# Patient Record
Sex: FEMALE | Race: BLACK OR AFRICAN AMERICAN | NOT HISPANIC OR LATINO | Employment: FULL TIME | URBAN - METROPOLITAN AREA
[De-identification: names, ages, dates, MRNs, and addresses within clinical notes are randomized per-mention and may not be internally consistent; named-entity substitution may affect disease eponyms.]

---

## 2020-07-20 ENCOUNTER — TELEPHONE (OUTPATIENT)
Dept: FAMILY MEDICINE CLINIC | Facility: CLINIC | Age: 50
End: 2020-07-20

## 2020-07-24 ENCOUNTER — OFFICE VISIT (OUTPATIENT)
Dept: FAMILY MEDICINE CLINIC | Facility: CLINIC | Age: 50
End: 2020-07-24

## 2020-07-24 VITALS
RESPIRATION RATE: 16 BRPM | HEIGHT: 67 IN | TEMPERATURE: 97.7 F | HEART RATE: 71 BPM | SYSTOLIC BLOOD PRESSURE: 120 MMHG | OXYGEN SATURATION: 97 % | WEIGHT: 293 LBS | DIASTOLIC BLOOD PRESSURE: 80 MMHG | BODY MASS INDEX: 45.99 KG/M2

## 2020-07-24 DIAGNOSIS — I10 ESSENTIAL HYPERTENSION: ICD-10-CM

## 2020-07-24 DIAGNOSIS — E66.01 CLASS 3 SEVERE OBESITY DUE TO EXCESS CALORIES WITHOUT SERIOUS COMORBIDITY WITH BODY MASS INDEX (BMI) OF 50.0 TO 59.9 IN ADULT (HCC): Primary | ICD-10-CM

## 2020-07-24 PROBLEM — Z86.718 HISTORY OF DEEP VEIN THROMBOSIS (DVT) OF LOWER EXTREMITY: Status: ACTIVE | Noted: 2020-07-24

## 2020-07-24 PROBLEM — E66.9 OBESITY, UNSPECIFIED: Status: ACTIVE | Noted: 2020-07-24

## 2020-07-24 PROBLEM — Z01.419 ENCOUNTER FOR GYNECOLOGICAL EXAMINATION: Status: ACTIVE | Noted: 2019-11-08

## 2020-07-24 PROBLEM — I48.0 PAROXYSMAL ATRIAL FIBRILLATION: Status: ACTIVE | Noted: 2017-03-27

## 2020-07-24 PROBLEM — M54.50 LOW BACK PAIN: Status: ACTIVE | Noted: 2019-05-06

## 2020-07-24 PROBLEM — J45.909 ASTHMA: Status: ACTIVE | Noted: 2020-02-03

## 2020-07-24 PROBLEM — N92.0 EXCESSIVE OR FREQUENT MENSTRUATION: Status: ACTIVE | Noted: 2020-07-24

## 2020-07-24 PROBLEM — I82.402 DEEP VEIN THROMBOSIS OF LEFT LOWER EXTREMITY: Status: ACTIVE | Noted: 2018-03-19

## 2020-07-24 PROBLEM — G47.33 OBSTRUCTIVE SLEEP APNEA (ADULT) (PEDIATRIC): Status: ACTIVE | Noted: 2018-03-19

## 2020-07-24 PROCEDURE — 99214 OFFICE O/P EST MOD 30 MIN: CPT | Performed by: INTERNAL MEDICINE

## 2020-07-24 RX ORDER — METOPROLOL SUCCINATE 50 MG/1
50 TABLET, EXTENDED RELEASE ORAL DAILY
COMMUNITY
End: 2020-10-22 | Stop reason: SDUPTHER

## 2020-07-24 RX ORDER — ALBUTEROL SULFATE 2.5 MG/3ML
2.5 SOLUTION RESPIRATORY (INHALATION) 3 TIMES DAILY PRN
COMMUNITY
End: 2022-02-14

## 2020-07-24 RX ORDER — ACETAMINOPHEN 500 MG
500 TABLET ORAL AS NEEDED
COMMUNITY

## 2020-08-22 NOTE — PROGRESS NOTES
07/24/2020    CC: Hypertension (follow up)  .        HPI  Obesity   This is a chronic problem. The current episode started more than 1 year ago. The problem occurs constantly. The problem has been waxing and waning. The treatment provided no relief.        Renny Frey is a 49 y.o. female.      The following portions of the patient's history were reviewed and updated as appropriate: allergies, current medications, past family history, past medical history, past social history, past surgical history and problem list.    Problem List  Patient Active Problem List   Diagnosis   • Asthma   • Low back pain   • Deep vein thrombosis of left lower extremity (CMS/HCC)   • Obstructive sleep apnea (adult) (pediatric)   • Paroxysmal atrial fibrillation (CMS/HCC)   • Major depressive disorder, single episode, unspecified   • Excessive or frequent menstruation   • Polyp of corpus uteri   • Essential hypertension, benign   • Mitral valve insufficiency   • Obesity, unspecified   • Encounter for gynecological examination   • History of deep vein thrombosis (DVT) of lower extremity       Past Medical History  Past Medical History:   Diagnosis Date   • Asthma 2/3/2020   • Deep vein thrombosis of left lower extremity (CMS/HCC) 3/19/2018   • Essential hypertension, benign 4/28/2011   • Excessive or frequent menstruation 7/24/2020   • Low back pain 5/6/2019   • Major depressive disorder, single episode, unspecified 11/5/2012   • Mitral valve insufficiency 1/1/2008   • Obesity, unspecified 7/24/2020   • Obstructive sleep apnea (adult) (pediatric) 3/19/2018   • Paroxysmal atrial fibrillation (CMS/HCC) 3/27/2017   • Polyp of corpus uteri 9/25/2012       Surgical History  Past Surgical History:   Procedure Laterality Date   • TONSILLECTOMY  12/01/2012       Family History  History reviewed. No pertinent family history.    Social History  Social History    Tobacco Use      Smoking status: Never Smoker      Smokeless tobacco:  Never Used       Is the Patient a current tobacco user? No    Allergies  No Known Allergies    Current Medications    Current Outpatient Medications:   •  acetaminophen (TYLENOL) 500 MG tablet, Take 500 mg by mouth Every 6 (Six) Hours As Needed for Mild Pain ., Disp: , Rfl:   •  albuterol (PROVENTIL) (2.5 MG/3ML) 0.083% nebulizer solution, Take 2.5 mg by nebulization 3 (Three) Times a Day As Needed for Wheezing., Disp: , Rfl:   •  apixaban (ELIQUIS) 5 MG tablet tablet, Take 5 mg by mouth 2 (Two) Times a Day., Disp: , Rfl:   •  metoprolol succinate XL (TOPROL-XL) 50 MG 24 hr tablet, Take 50 mg by mouth Daily., Disp: , Rfl:      Review of System  Review of Systems   Constitutional: Negative.    HENT: Negative.    Eyes: Negative.    Respiratory: Negative.    Cardiovascular: Negative.    Gastrointestinal: Negative.    Musculoskeletal: Negative.    Skin: Negative.    Psychiatric/Behavioral: Negative.      I have reviewed and confirmed the accuracy of the ROS as documented by the MA/LPN/RN Yousuf Dhillon MD    Vitals:    07/24/20 1045   BP: 120/80   Pulse: 71   Resp: 16   Temp: 97.7 °F (36.5 °C)   SpO2: 97%     Body mass index is 57.39 kg/m².    Objective     No visits with results within 30 Day(s) from this visit.   Latest known visit with results is:   No results found for any previous visit.       Physical Exam  Physical Exam   Constitutional: She is oriented to person, place, and time. She appears well-developed and well-nourished.   Eyes: Conjunctivae and EOM are normal.   Neck: Normal range of motion. Neck supple.   Cardiovascular: Normal rate, regular rhythm and normal heart sounds.   Pulmonary/Chest: Effort normal and breath sounds normal.   Abdominal: Soft. Bowel sounds are normal.   Neurological: She is alert and oriented to person, place, and time.   Skin: Skin is warm and dry.   Psychiatric: She has a normal mood and affect. Her behavior is normal.   Nursing note and vitals reviewed.      Assessment/Plan    Sarah was seen today for hypertension and discussion regarding her weight    Patient is blood pressure was well-controlled today at 120/80 in the left arm sitting position standard cuff.  She relates she's taken her medications as prescribed.  A little despondent today as she has continued to  weight.  Her cane at home and being under more stress is causing her to eat more.  She is tried a number of diets in the past which have not been successful.    Total time of office visit was 30 minutes of which 25 minutes was spent discussing the areas of weight loss and answering the patient's questions regarding bariatric surgery.  Discussed that weight loss will be a lifestyle experience even if coupled with bariatric surgery.  It is important that the patient not have unwanted expectations and will set that the surgery alone will not also weight loss and keep it off.  I did not feel expert enough to recommend bariatric over medical weight loss.  However, the patient is willing to get more information regarding bariatric surgery and will do so before our next visit.  She will also check with her insurance regarding procedures for getting meds for this patient etc.  Patient will also determine what physicians in the area approved by her insurance if she decides on bariatric surgery.    Follow-up in 3-4 months              Diagnoses and all orders for this visit:    Class 3 severe obesity due to excess calories without serious comorbidity with body mass index (BMI) of 50.0 to 59.9 in adult (CMS/[facility])    Essential hypertension             Yousuf Dhillon MD  07/24/2020

## 2020-10-22 RX ORDER — METOPROLOL SUCCINATE 50 MG/1
50 TABLET, EXTENDED RELEASE ORAL DAILY
Status: CANCELLED | OUTPATIENT
Start: 2020-10-22

## 2020-10-22 RX ORDER — METOPROLOL SUCCINATE 50 MG/1
50 TABLET, EXTENDED RELEASE ORAL DAILY
Qty: 30 TABLET | Refills: 4 | Status: SHIPPED | OUTPATIENT
Start: 2020-10-22 | End: 2021-10-12

## 2020-10-22 NOTE — TELEPHONE ENCOUNTER
PATIENT REQUESTED A REFILL ON:metoprolol succinate XL (TOPROL-XL) 50 MG 24 hr tablet    PATIENT CAN BE REACHED ON:[phone]    PHARMACY PREFERRED:[facility] [address] [place] [address] ([facility]), [place] - [address] [facility] [phone] [facility] [phone] FX

## 2021-03-15 ENCOUNTER — OFFICE VISIT (OUTPATIENT)
Dept: FAMILY MEDICINE CLINIC | Facility: CLINIC | Age: 51
End: 2021-03-15

## 2021-03-15 VITALS — RESPIRATION RATE: 16 BRPM | HEIGHT: 67 IN | BODY MASS INDEX: 45.99 KG/M2 | WEIGHT: 293 LBS

## 2021-03-15 DIAGNOSIS — I48.0 PAROXYSMAL ATRIAL FIBRILLATION (HCC): ICD-10-CM

## 2021-03-15 DIAGNOSIS — R63.4 WEIGHT LOSS: Primary | ICD-10-CM

## 2021-03-15 DIAGNOSIS — D50.9 IRON DEFICIENCY ANEMIA, UNSPECIFIED IRON DEFICIENCY ANEMIA TYPE: ICD-10-CM

## 2021-03-15 PROBLEM — R63.2 POLYPHAGIA: Status: ACTIVE | Noted: 2020-12-23

## 2021-03-15 PROBLEM — M19.90 ARTHRITIS: Status: ACTIVE | Noted: 2020-03-16

## 2021-03-15 PROBLEM — Z86.718 PERSONAL HISTORY OF OTHER VENOUS THROMBOSIS AND EMBOLISM: Status: ACTIVE | Noted: 2020-07-24

## 2021-03-15 PROBLEM — J30.9 ALLERGIC RHINITIS: Status: ACTIVE | Noted: 2020-03-16

## 2021-03-15 PROCEDURE — 99396 PREV VISIT EST AGE 40-64: CPT | Performed by: INTERNAL MEDICINE

## 2021-03-15 RX ORDER — DIPHENOXYLATE HYDROCHLORIDE AND ATROPINE SULFATE 2.5; .025 MG/1; MG/1
1 TABLET ORAL AS NEEDED
COMMUNITY

## 2021-03-15 NOTE — PROGRESS NOTES
03/15/2021    CC: Annual Exam (...no other issues)  .        HPI  Patient presents for physical examination.       Renny Frey is a 50 y.o. female.      The following portions of the patient's history were reviewed and updated as appropriate: allergies, current medications, past family history, past medical history, past social history, past surgical history and problem list.    Problem List  Patient Active Problem List   Diagnosis   • Asthma   • Low back pain   • Deep vein thrombosis of left lower extremity (CMS/HCC)   • Obstructive sleep apnea syndrome   • Paroxysmal atrial fibrillation (CMS/HCC)   • Major depression, single episode   • Excessive or frequent menstruation   • Polyp of corpus uteri   • Benign essential hypertension   • Mitral valve regurgitation   • Obesity   • Encounter for gynecological examination   • History of deep vein thrombosis (DVT) of lower extremity   • Allergic rhinitis   • Arthritis   • Personal history of other venous thrombosis and embolism   • Polyphagia   • Viral hepatitis C       Past Medical History  Past Medical History:   Diagnosis Date   • Asthma 2/3/2020   • Deep vein thrombosis of left lower extremity (CMS/HCC) 3/19/2018   • Essential hypertension, benign 4/28/2011   • Excessive or frequent menstruation 7/24/2020   • Low back pain 5/6/2019   • Major depressive disorder, single episode, unspecified 11/5/2012   • Mitral valve insufficiency 1/1/2008   • Obesity, unspecified 7/24/2020   • Obstructive sleep apnea (adult) (pediatric) 3/19/2018   • Paroxysmal atrial fibrillation (CMS/HCC) 3/27/2017   • Polyp of corpus uteri 9/25/2012       Surgical History  Past Surgical History:   Procedure Laterality Date   • TONSILLECTOMY  12/01/2012       Family History  History reviewed. No pertinent family history.    Social History  Social History    Tobacco Use      Smoking status: Never Smoker      Smokeless tobacco: Never Used       Is the Patient a current tobacco user?  No    Allergies  No Known Allergies    Current Medications    Current Outpatient Medications:   •  acetaminophen (TYLENOL) 500 MG tablet, Take 500 mg by mouth Every 6 (Six) Hours As Needed for Mild Pain ., Disp: , Rfl:   •  albuterol (PROVENTIL) (2.5 MG/3ML) 0.083% nebulizer solution, Take 2.5 mg by nebulization 3 (Three) Times a Day As Needed for Wheezing., Disp: , Rfl:   •  apixaban (ELIQUIS) 5 MG tablet tablet, Take 5 mg by mouth 2 (Two) Times a Day., Disp: , Rfl:   •  metoprolol succinate XL (TOPROL-XL) 50 MG 24 hr tablet, Take 1 tablet by mouth Daily., Disp: 30 tablet, Rfl: 4  •  multivitamin (MULTIPLE VITAMIN-FOLIC ACID PO), Take  by mouth., Disp: , Rfl:      Review of System  Review of Systems   Constitutional: Negative.    HENT: Negative.    Eyes: Negative.    Respiratory: Negative.    Cardiovascular: Negative.    Gastrointestinal: Negative.    Musculoskeletal: Negative.    Skin: Negative.    Psychiatric/Behavioral: Negative.      I have reviewed and confirmed the accuracy of the ROS as documented by the MA/LPN/RN Yousuf Dhillon MD    Vitals:    03/15/21 1408   Resp: 16     Body mass index is 55.91 kg/m².    Objective     Physical Exam  Physical Exam  Vitals and nursing note reviewed.   Constitutional:       Appearance: She is well-developed. She is obese.   HENT:      Head: Normocephalic and atraumatic.      Mouth/Throat:      Mouth: Mucous membranes are moist.   Eyes:      Extraocular Movements: Extraocular movements intact.      Conjunctiva/sclera: Conjunctivae normal.   Cardiovascular:      Rate and Rhythm: Normal rate and regular rhythm.      Heart sounds: Normal heart sounds.   Pulmonary:      Effort: Pulmonary effort is normal.      Breath sounds: Normal breath sounds.   Abdominal:      General: Bowel sounds are normal.      Palpations: Abdomen is soft.   Musculoskeletal:         General: Normal range of motion.      Cervical back: Normal range of motion and neck supple.   Skin:     General: Skin  is warm and dry.   Neurological:      Mental Status: She is alert and oriented to person, place, and time.   Psychiatric:         Mood and Affect: Mood normal.         Behavior: Behavior normal.         Assessment/Plan      In the interim of visits patient had LAP-BAND surgery done at [facility].  She has lost a total of 14 pounds.  She is a little despondent because she has not lost more but she is making progress.    We had extended discussion regarding the COVID-19 vaccination and whether or not she should get it.  She has not quite decided.  I answered her questions regarding side effects and advantages versus disadvantages of both all 3 types of vaccines available in the [place].              Diagnoses and all orders for this visit:    1. Weight loss (Primary)  -     Lipid panel; Future  -     Comprehensive Metabolic Panel  -     Hemoglobin A1c  -     Lipid Panel With / Chol / HDL Ratio  -     Urinalysis With Culture If Indicated -  -     T4, Free  -     TSH    2. Iron deficiency anemia, unspecified iron deficiency anemia type  -     Comprehensive metabolic panel; Future  -     CBC & Differential    3. Paroxysmal atrial fibrillation (CMS/HCC)         Plan:  Follow-up in the next 5 to 6 months.    Yousuf Dhillon MD  03/15/2021

## 2021-03-16 LAB
ALBUMIN SERPL-MCNC: 4.1 G/DL (ref 3.8–4.8)
ALBUMIN/GLOB SERPL: 1.5 {RATIO} (ref 1.2–2.2)
ALP SERPL-CCNC: 85 IU/L (ref 39–117)
ALT SERPL-CCNC: 11 IU/L (ref 0–32)
AST SERPL-CCNC: 16 IU/L (ref 0–40)
BASOPHILS # BLD AUTO: 0 X10E3/UL (ref 0–0.2)
BASOPHILS NFR BLD AUTO: 0 %
BILIRUB SERPL-MCNC: 0.4 MG/DL (ref 0–1.2)
BUN SERPL-MCNC: 11 MG/DL (ref 6–24)
BUN/CREAT SERPL: 14 (ref 9–23)
CALCIUM SERPL-MCNC: 9.3 MG/DL (ref 8.7–10.2)
CHLORIDE SERPL-SCNC: 105 MMOL/L (ref 96–106)
CHOLEST SERPL-MCNC: 139 MG/DL (ref 100–199)
CHOLEST/HDLC SERPL: 3.6 RATIO (ref 0–4.4)
CO2 SERPL-SCNC: 24 MMOL/L (ref 20–29)
CREAT SERPL-MCNC: 0.76 MG/DL (ref 0.57–1)
EOSINOPHIL # BLD AUTO: 0.3 X10E3/UL (ref 0–0.4)
EOSINOPHIL NFR BLD AUTO: 4 %
ERYTHROCYTE [DISTWIDTH] IN BLOOD BY AUTOMATED COUNT: 13.2 % (ref 11.7–15.4)
GLOBULIN SER CALC-MCNC: 2.7 G/DL (ref 1.5–4.5)
GLUCOSE SERPL-MCNC: 82 MG/DL (ref 65–99)
HBA1C MFR BLD: 5.8 % (ref 4.8–5.6)
HCT VFR BLD AUTO: 38.7 % (ref 34–46.6)
HDLC SERPL-MCNC: 39 MG/DL
HGB BLD-MCNC: 12.5 G/DL (ref 11.1–15.9)
IMM GRANULOCYTES # BLD AUTO: 0 X10E3/UL (ref 0–0.1)
IMM GRANULOCYTES NFR BLD AUTO: 0 %
LDLC SERPL CALC-MCNC: 88 MG/DL (ref 0–99)
LYMPHOCYTES # BLD AUTO: 2.9 X10E3/UL (ref 0.7–3.1)
LYMPHOCYTES NFR BLD AUTO: 41 %
MCH RBC QN AUTO: 29 PG (ref 26.6–33)
MCHC RBC AUTO-ENTMCNC: 32.3 G/DL (ref 31.5–35.7)
MCV RBC AUTO: 90 FL (ref 79–97)
MONOCYTES # BLD AUTO: 0.6 X10E3/UL (ref 0.1–0.9)
MONOCYTES NFR BLD AUTO: 9 %
NEUTROPHILS # BLD AUTO: 3.3 X10E3/UL (ref 1.4–7)
NEUTROPHILS NFR BLD AUTO: 46 %
PLATELET # BLD AUTO: 261 X10E3/UL (ref 150–450)
POTASSIUM SERPL-SCNC: 4.2 MMOL/L (ref 3.5–5.2)
PROT SERPL-MCNC: 6.8 G/DL (ref 6–8.5)
RBC # BLD AUTO: 4.31 X10E6/UL (ref 3.77–5.28)
SODIUM SERPL-SCNC: 142 MMOL/L (ref 134–144)
T4 FREE SERPL-MCNC: 1.21 NG/DL (ref 0.82–1.77)
TRIGL SERPL-MCNC: 54 MG/DL (ref 0–149)
TSH SERPL DL<=0.005 MIU/L-ACNC: 1.27 UIU/ML (ref 0.45–4.5)
VLDLC SERPL CALC-MCNC: 12 MG/DL (ref 5–40)
WBC # BLD AUTO: 7.2 X10E3/UL (ref 3.4–10.8)

## 2021-03-22 ENCOUNTER — TELEPHONE (OUTPATIENT)
Dept: FAMILY MEDICINE CLINIC | Facility: CLINIC | Age: 51
End: 2021-03-22

## 2021-03-22 NOTE — TELEPHONE ENCOUNTER
Caller: Sarah Frey    Relationship: Self    Best call back number: [phone]    What medications are you currently taking:   Current Outpatient Medications on File Prior to Visit   Medication Sig Dispense Refill   • acetaminophen (TYLENOL) 500 MG tablet Take 500 mg by mouth Every 6 (Six) Hours As Needed for Mild Pain .     • albuterol (PROVENTIL) (2.5 MG/3ML) 0.083% nebulizer solution Take 2.5 mg by nebulization 3 (Three) Times a Day As Needed for Wheezing.     • apixaban (ELIQUIS) 5 MG tablet tablet Take 5 mg by mouth 2 (Two) Times a Day.     • metoprolol succinate XL (TOPROL-XL) 50 MG 24 hr tablet Take 1 tablet by mouth Daily. 30 tablet 4   • multivitamin (MULTIPLE VITAMIN-FOLIC ACID PO) Take  by mouth.       No current facility-administered medications on file prior to visit.        Which medication are you concerned about: FLECAINIDE    Who prescribed you this medication: CARDIO DR GREGORY LUCAS    What are your concerns: WOULD LIKE TO TALK TO DR MC ABOUT THIS MEDICATION

## 2021-03-23 NOTE — TELEPHONE ENCOUNTER
Patient is currently seeing Dr. Soto, cardiologist.  . Dr. Soto ordered the patient's to start tomorrow because of increased irregularities in her heart.  The patient upon reading the package insert and other information is concerned about the side effects of the medication.  I directed her to give Dr. Soto a call and relay those concerns to Dr. Soto tomorrow.

## 2021-08-03 ENCOUNTER — TELEPHONE (OUTPATIENT)
Dept: FAMILY MEDICINE CLINIC | Facility: CLINIC | Age: 51
End: 2021-08-03

## 2021-08-03 NOTE — TELEPHONE ENCOUNTER
Patient advised to take Tylenol for the fever and muscle aches.  She can take a cough syrup for the cough and runny nose.

## 2021-08-03 NOTE — TELEPHONE ENCOUNTER
Caller: Sarah Frey    Relationship: Self    Best call back number: [phone]     What is the best time to reach you: ASAP  Who are you requesting to speak with (clinical staff, provider,  specific staff member): CLINICAL    Do you know the name of the person who called: SARAH    What was the call regarding: PATIENTS SON HAS BEEN DIAGNOSED WITH COVID AND SHE IS NOW HAVING SYMPTOMS AND THE HEALTH DEPARTEMENT [facility] . SHE WANTS TO KNOW IF THERE IS ANY MEDICINE SHE NEEDS OR ANY TREATMENT SHE NEEDS. SHE IS COUGHING , FEVER AND MUCUS , MUSCLE ACHES AND RUNNY NOSE .     Do you require a callback: YES

## 2021-08-12 ENCOUNTER — TELEPHONE (OUTPATIENT)
Dept: FAMILY MEDICINE CLINIC | Facility: CLINIC | Age: 51
End: 2021-08-12

## 2021-08-12 NOTE — TELEPHONE ENCOUNTER
Caller: Sarah Frey    Relationship: Self    Best call back number: [phone]    Who are you requesting to speak with (clinical staff, provider,  specific staff member): DR. MC       What was the call regarding: PATIENT WAS JUST DISCHARGED FROM [facility] FOR COVID PNEUMONIA. PATIENT IS FEELING MUCH BETTER NOW.      Do you require a callback: NO

## 2021-10-11 NOTE — TELEPHONE ENCOUNTER
Rx Refill Note  Requested Prescriptions     Pending Prescriptions Disp Refills   • metoprolol succinate XL (TOPROL-XL) 50 MG 24 hr tablet [Pharmacy Med Name: Metoprolol Succinate ER 50 MG Oral Tablet Extended Release 24 Hour] 30 tablet 0     Sig: Take 1 tablet by mouth once daily      Last office visit with prescribing clinician: 3/15/2021      Next office visit with prescribing clinician: Visit date not found            Rashel Soto MA  10/11/21, 13:33 EDT

## 2021-10-12 RX ORDER — METOPROLOL SUCCINATE 50 MG/1
TABLET, EXTENDED RELEASE ORAL
Qty: 30 TABLET | Refills: 0 | Status: SHIPPED | OUTPATIENT
Start: 2021-10-12 | End: 2021-11-09

## 2021-11-09 RX ORDER — METOPROLOL SUCCINATE 50 MG/1
TABLET, EXTENDED RELEASE ORAL
Qty: 30 TABLET | Refills: 0 | Status: SHIPPED | OUTPATIENT
Start: 2021-11-09 | End: 2021-12-15

## 2021-11-09 NOTE — TELEPHONE ENCOUNTER
Rx Refill Note  Requested Prescriptions     Pending Prescriptions Disp Refills   • metoprolol succinate XL (TOPROL-XL) 50 MG 24 hr tablet [Pharmacy Med Name: Metoprolol Succinate ER 50 MG Oral Tablet Extended Release 24 Hour] 30 tablet 0     Sig: Take 1 tablet by mouth once daily      Last office visit with prescribing clinician: 3/15/2021      Next office visit with prescribing clinician: 11/16/2021            Poly Gutierres MA  11/09/21, 16:41 EST

## 2021-11-16 ENCOUNTER — OFFICE VISIT (OUTPATIENT)
Dept: FAMILY MEDICINE CLINIC | Facility: CLINIC | Age: 51
End: 2021-11-16

## 2021-11-16 VITALS — HEIGHT: 67 IN | WEIGHT: 293 LBS | BODY MASS INDEX: 45.99 KG/M2

## 2021-11-16 DIAGNOSIS — F32.1 CURRENT MODERATE EPISODE OF MAJOR DEPRESSIVE DISORDER WITHOUT PRIOR EPISODE (HCC): Primary | Chronic | ICD-10-CM

## 2021-11-16 DIAGNOSIS — I47.1 PAT (PAROXYSMAL ATRIAL TACHYCARDIA) (HCC): Chronic | ICD-10-CM

## 2021-11-16 PROBLEM — J96.01 ACUTE RESPIRATORY FAILURE WITH HYPOXIA: Status: ACTIVE | Noted: 2021-08-09

## 2021-11-16 PROBLEM — J12.82 PNEUMONIA DUE TO COVID-19 VIRUS: Status: ACTIVE | Noted: 2021-08-09

## 2021-11-16 PROBLEM — I51.7 CARDIOMEGALY: Status: ACTIVE | Noted: 2021-08-09

## 2021-11-16 PROBLEM — U07.1 PNEUMONIA DUE TO COVID-19 VIRUS: Status: ACTIVE | Noted: 2021-08-09

## 2021-11-16 PROCEDURE — 99214 OFFICE O/P EST MOD 30 MIN: CPT | Performed by: INTERNAL MEDICINE

## 2021-11-16 RX ORDER — FLECAINIDE ACETATE 50 MG/1
50 TABLET ORAL EVERY 12 HOURS
COMMUNITY

## 2021-11-20 NOTE — PROGRESS NOTES
11/16/2021    CC: Anxiety  .        HPI  Depression  Visit Type: initial  Onset of symptoms: in the past 7 days  Progression since onset: waxing and waning  Patient presents with the following symptoms: anhedonia, decreased concentration, dizziness, feelings of worthlessness, insomnia, irritability, memory impairment and palpitations.         Renny Frey is a 51 y.o. female.      The following portions of the patient's history were reviewed and updated as appropriate: allergies, current medications, past family history, past medical history, past social history, past surgical history and problem list.    Problem List  Patient Active Problem List   Diagnosis   • Asthma   • Low back pain   • Deep vein thrombosis of left lower extremity (HCC)   • Obstructive sleep apnea syndrome   • Paroxysmal atrial fibrillation (HCC)   • Major depression, single episode   • Excessive or frequent menstruation   • Polyp of corpus uteri   • Benign essential hypertension   • Mitral valve regurgitation   • Obesity   • Encounter for gynecological examination   • History of deep vein thrombosis (DVT) of lower extremity   • Allergic rhinitis   • Arthritis   • Personal history of other venous thrombosis and embolism   • Polyphagia   • Viral hepatitis C   • Acute respiratory failure with hypoxia (HCC)   • Cardiomegaly   • Pneumonia due to COVID-19 virus       Past Medical History  Past Medical History:   Diagnosis Date   • Asthma 2/3/2020   • Deep vein thrombosis of left lower extremity (HCC) 3/19/2018   • Essential hypertension, benign 4/28/2011   • Excessive or frequent menstruation 7/24/2020   • Low back pain 5/6/2019   • Major depressive disorder, single episode, unspecified 11/5/2012   • Mitral valve insufficiency 1/1/2008   • Obesity, unspecified 7/24/2020   • Obstructive sleep apnea (adult) (pediatric) 3/19/2018   • Paroxysmal atrial fibrillation (HCC) 3/27/2017   • Polyp of corpus uteri 9/25/2012       Surgical  History  Past Surgical History:   Procedure Laterality Date   • TONSILLECTOMY  12/01/2012       Family History  History reviewed. No pertinent family history.    Social History  Social History    Tobacco Use      Smoking status: Never Smoker      Smokeless tobacco: Never Used       Is the Patient a current tobacco user? No    Allergies  No Known Allergies    Current Medications    Current Outpatient Medications:   •  acetaminophen (TYLENOL) 500 MG tablet, Take 500 mg by mouth Every 6 (Six) Hours As Needed for Mild Pain ., Disp: , Rfl:   •  albuterol (PROVENTIL) (2.5 MG/3ML) 0.083% nebulizer solution, Take 2.5 mg by nebulization 3 (Three) Times a Day As Needed for Wheezing., Disp: , Rfl:   •  apixaban (ELIQUIS) 5 MG tablet tablet, Take 5 mg by mouth 2 (Two) Times a Day., Disp: , Rfl:   •  flecainide (TAMBOCOR) 50 MG tablet, Take 50 mg by mouth Every 12 (Twelve) Hours., Disp: , Rfl:   •  metoprolol succinate XL (TOPROL-XL) 50 MG 24 hr tablet, Take 1 tablet by mouth once daily, Disp: 30 tablet, Rfl: 0  •  multivitamin (MULTIPLE VITAMIN-FOLIC ACID PO), Take  by mouth., Disp: , Rfl:      Review of System  Review of Systems   Constitutional: Positive for irritability.   HENT: Negative.    Eyes: Negative.    Respiratory: Negative.    Cardiovascular: Positive for palpitations.   Endocrine: Negative.    Psychiatric/Behavioral: Positive for decreased concentration. The patient has insomnia.      I have reviewed and confirmed the accuracy of the ROS as documented by the MA/LPN/RN Yousuf Dhillon MD    There were no vitals filed for this visit.  Body mass index is 53 kg/m².    Objective     Physical Exam  Physical Exam  HENT:      Right Ear: Tympanic membrane normal.      Left Ear: Tympanic membrane normal.   Cardiovascular:      Rate and Rhythm: Normal rate and regular rhythm.      Pulses: Normal pulses.      Heart sounds: Normal heart sounds.   Pulmonary:      Effort: Pulmonary effort is normal.      Breath sounds: Normal  breath sounds.         Assessment/Plan      This 51-year-old patient recently lost her significant other to COVID-19 related problems.  She relates she has a long history of seasonal affective disorder as well.  She works in a very pressurized environment and notes that her recent memory has decreased.    We administered a PHQ-9 for depression on which the patient scored a total of 12.  She answered several on 1, HTN-greater than a half on a, C, E, F-every on 1D, G-external difficult positive all #2.  In conclusion I feel the patient has minor to major depression.  However she is also taking flecainide for PAT prescribed by Dr. Cordelia Soto cardiologist.  The SSRIs potentiate the flecainide and therefore cannot be used.  Patient also has a history of obstructive sleep apnea.  We will contact Dr. Soto at [phone] at [facility] but for now we will not prescribe any SSRI but will seek referral to psychiatry regarding advice on medication.        Diagnoses and all orders for this visit:    1. Current moderate episode of major depressive disorder without prior episode (HCC) (Primary)  -     Ambulatory Referral to Psychiatry    2. PAT (paroxysmal atrial tachycardia) ([facility])      Plan:  1.)  We will contact Dr. Cordelia Soto regarding flecainide and SSRI.  2.)  Consultation to psychiatry regarding management of depression in this patient.       Yousuf Dhillon MD  11/16/2021

## 2021-11-30 ENCOUNTER — TELEPHONE (OUTPATIENT)
Dept: FAMILY MEDICINE CLINIC | Facility: CLINIC | Age: 51
End: 2021-11-30

## 2021-12-14 ENCOUNTER — OFFICE VISIT (OUTPATIENT)
Dept: FAMILY MEDICINE CLINIC | Facility: CLINIC | Age: 51
End: 2021-12-14

## 2021-12-14 VITALS — BODY MASS INDEX: 45.99 KG/M2 | WEIGHT: 293 LBS | RESPIRATION RATE: 16 BRPM | HEIGHT: 67 IN

## 2021-12-14 DIAGNOSIS — I47.1 PAT (PAROXYSMAL ATRIAL TACHYCARDIA) (HCC): Chronic | ICD-10-CM

## 2021-12-14 DIAGNOSIS — F32.1 CURRENT MODERATE EPISODE OF MAJOR DEPRESSIVE DISORDER WITHOUT PRIOR EPISODE (HCC): Primary | Chronic | ICD-10-CM

## 2021-12-14 PROCEDURE — 99214 OFFICE O/P EST MOD 30 MIN: CPT | Performed by: INTERNAL MEDICINE

## 2021-12-15 RX ORDER — METOPROLOL SUCCINATE 50 MG/1
TABLET, EXTENDED RELEASE ORAL
Qty: 30 TABLET | Refills: 0 | Status: SHIPPED | OUTPATIENT
Start: 2021-12-15 | End: 2022-01-18

## 2021-12-15 NOTE — TELEPHONE ENCOUNTER
Rx Refill Note  Requested Prescriptions     Pending Prescriptions Disp Refills   • metoprolol succinate XL (TOPROL-XL) 50 MG 24 hr tablet [Pharmacy Med Name: Metoprolol Succinate ER 50 MG Oral Tablet Extended Release 24 Hour] 30 tablet 0     Sig: Take 1 tablet by mouth once daily      Last office visit with prescribing clinician: 12/14/2021      Next office visit with prescribing clinician: 2/14/2022            Rashel Soto MA  12/15/21, 07:11 EST

## 2021-12-18 NOTE — PROGRESS NOTES
12/14/2021    CC: Depression (f/u...no other issues)  .        HPI  Depression  Visit Type: follow-up  Patient presents with the following symptoms: anhedonia, decreased concentration, depressed mood, fatigue and hypersomnia.  Frequency of symptoms: most days   Severity: moderate          Subjective   Sarah Frey is a 51 y.o. female.      The following portions of the patient's history were reviewed and updated as appropriate: allergies, current medications, past family history, past medical history, past social history, past surgical history and problem list.    Problem List  Patient Active Problem List   Diagnosis   • Asthma   • Low back pain   • Deep vein thrombosis of left lower extremity (HCC)   • Obstructive sleep apnea syndrome   • Paroxysmal atrial fibrillation (HCC)   • Major depression, single episode   • Excessive or frequent menstruation   • Polyp of corpus uteri   • Benign essential hypertension   • Mitral valve regurgitation   • Obesity   • Encounter for gynecological examination   • History of deep vein thrombosis (DVT) of lower extremity   • Allergic rhinitis   • Arthritis   • Personal history of other venous thrombosis and embolism   • Polyphagia   • Viral hepatitis C   • Acute respiratory failure with hypoxia (HCC)   • Cardiomegaly   • Pneumonia due to COVID-19 virus       Past Medical History  Past Medical History:   Diagnosis Date   • Asthma 2/3/2020   • Deep vein thrombosis of left lower extremity (HCC) 3/19/2018   • Essential hypertension, benign 4/28/2011   • Excessive or frequent menstruation 7/24/2020   • Low back pain 5/6/2019   • Major depressive disorder, single episode, unspecified 11/5/2012   • Mitral valve insufficiency 1/1/2008   • Obesity, unspecified 7/24/2020   • Obstructive sleep apnea (adult) (pediatric) 3/19/2018   • Paroxysmal atrial fibrillation (HCC) 3/27/2017   • Polyp of corpus uteri 9/25/2012       Surgical History  Past Surgical History:   Procedure Laterality Date   •  TONSILLECTOMY  12/01/2012       Family History  History reviewed. No pertinent family history.    Social History  Social History    Tobacco Use      Smoking status: Never Smoker      Smokeless tobacco: Never Used       Is the Patient a current tobacco user? No    Allergies  No Known Allergies    Current Medications    Current Outpatient Medications:   •  acetaminophen (TYLENOL) 500 MG tablet, Take 500 mg by mouth Every 6 (Six) Hours As Needed for Mild Pain ., Disp: , Rfl:   •  albuterol (PROVENTIL) (2.5 MG/3ML) 0.083% nebulizer solution, Take 2.5 mg by nebulization 3 (Three) Times a Day As Needed for Wheezing., Disp: , Rfl:   •  apixaban (ELIQUIS) 5 MG tablet tablet, Take 5 mg by mouth 2 (Two) Times a Day., Disp: , Rfl:   •  flecainide (TAMBOCOR) 50 MG tablet, Take 50 mg by mouth Every 12 (Twelve) Hours., Disp: , Rfl:   •  multivitamin (MULTIPLE VITAMIN-FOLIC ACID PO), Take  by mouth., Disp: , Rfl:   •  metoprolol succinate XL (TOPROL-XL) 50 MG 24 hr tablet, Take 1 tablet by mouth once daily, Disp: 30 tablet, Rfl: 0     Review of System  Review of Systems   Respiratory: Negative.    Cardiovascular: Negative.    Gastrointestinal: Negative.    Endocrine: Negative.    Psychiatric/Behavioral: Positive for decreased concentration and depressed mood.     I have reviewed and confirmed the accuracy of the ROS as documented by the MA/LPN/RN Yousuf Dhillon MD    Vitals:    12/14/21 1148   Resp: 16     Body mass index is 52.47 kg/m².    Objective     Physical Exam  Physical Exam  Constitutional:       Appearance: Normal appearance.   HENT:      Head: Normocephalic and atraumatic.   Cardiovascular:      Rate and Rhythm: Normal rate and regular rhythm.      Pulses: Normal pulses.      Heart sounds: Normal heart sounds.   Pulmonary:      Effort: Pulmonary effort is normal.      Breath sounds: Normal breath sounds.   Abdominal:      Palpations: Abdomen is soft.   Neurological:      Mental Status: She is alert.          Assessment/Plan      This pleasant 51-year-old recently lost her long-term significant other to COVID-19 and is adjusting to life.  Her administered PHQ-9 is led to a diagnosis of depression but the patient is also on flecainide which interacts with all the SSRIs we would ordinarily use.  We made a referral to psychiatry and she will touch bases with them to try to get an appointment for management of her depression.  She is also seeing a psychological therapist but I feel that she needs an antidepressant as well.  In any case she is reaching out to you 90 behavior regarding obtaining the services of a psychiatrist who can help determine a suitable antidepressant agent for use with flecainide or to assume management of her depression without medication.        Diagnoses and all orders for this visit:    1. Current moderate episode of major depressive disorder without prior episode (HCC) (Primary)    2. PAT (paroxysmal atrial tachycardia) (HCC)         Plan:  1.)  Follow-up in 2 to 3 months.    Yousuf Dhillon MD  12/14/2021

## 2022-01-18 RX ORDER — METOPROLOL SUCCINATE 50 MG/1
TABLET, EXTENDED RELEASE ORAL
Qty: 30 TABLET | Refills: 0 | Status: SHIPPED | OUTPATIENT
Start: 2022-01-18 | End: 2022-02-17

## 2022-01-18 NOTE — TELEPHONE ENCOUNTER
Rx Refill Note  Requested Prescriptions     Pending Prescriptions Disp Refills   • metoprolol succinate XL (TOPROL-XL) 50 MG 24 hr tablet [Pharmacy Med Name: Metoprolol Succinate ER 50 MG Oral Tablet Extended Release 24 Hour] 30 tablet 0     Sig: Take 1 tablet by mouth once daily      Last office visit with prescribing clinician: 12/14/2021      Next office visit with prescribing clinician: 2/14/2022            Rashel Soto MA  01/18/22, 11:38 EST

## 2022-02-14 ENCOUNTER — OFFICE VISIT (OUTPATIENT)
Dept: FAMILY MEDICINE CLINIC | Facility: CLINIC | Age: 52
End: 2022-02-14

## 2022-02-14 VITALS — BODY MASS INDEX: 45.99 KG/M2 | WEIGHT: 293 LBS | HEIGHT: 67 IN | RESPIRATION RATE: 16 BRPM

## 2022-02-14 DIAGNOSIS — F41.8 ANXIETY ASSOCIATED WITH DEPRESSION: Primary | Chronic | ICD-10-CM

## 2022-02-14 PROCEDURE — 99213 OFFICE O/P EST LOW 20 MIN: CPT | Performed by: INTERNAL MEDICINE

## 2022-02-14 RX ORDER — BUSPIRONE HYDROCHLORIDE 15 MG/1
15 TABLET ORAL 2 TIMES DAILY
COMMUNITY

## 2022-02-14 NOTE — PROGRESS NOTES
02/14/2022    CC: Depression (f/u...no other issues)  .        HPI  Depression  Visit Type: follow-up  Patient presents with the following symptoms: depressed mood and weight gain.  Frequency of symptoms: occasionally   Severity: mild          Subjective   Sarah Frey is a 51 y.o. female.      The following portions of the patient's history were reviewed and updated as appropriate: allergies, current medications, past family history, past medical history, past social history, past surgical history and problem list.    Problem List  Patient Active Problem List   Diagnosis   • Asthma   • Low back pain   • Deep vein thrombosis of left lower extremity (HCC)   • Obstructive sleep apnea syndrome   • Paroxysmal atrial fibrillation (HCC)   • Major depression, single episode   • Excessive or frequent menstruation   • Polyp of corpus uteri   • Benign essential hypertension   • Mitral valve regurgitation   • Obesity   • Encounter for gynecological examination   • History of deep vein thrombosis (DVT) of lower extremity   • Allergic rhinitis   • Arthritis   • Personal history of other venous thrombosis and embolism   • Polyphagia   • Viral hepatitis C   • Acute respiratory failure with hypoxia (HCC)   • Cardiomegaly   • Pneumonia due to COVID-19 virus       Past Medical History  Past Medical History:   Diagnosis Date   • Asthma 2/3/2020   • Deep vein thrombosis of left lower extremity (HCC) 3/19/2018   • Essential hypertension, benign 4/28/2011   • Excessive or frequent menstruation 7/24/2020   • Low back pain 5/6/2019   • Major depressive disorder, single episode, unspecified 11/5/2012   • Mitral valve insufficiency 1/1/2008   • Obesity, unspecified 7/24/2020   • Obstructive sleep apnea (adult) (pediatric) 3/19/2018   • Paroxysmal atrial fibrillation (HCC) 3/27/2017   • Polyp of corpus uteri 9/25/2012       Surgical History  Past Surgical History:   Procedure Laterality Date   • TONSILLECTOMY  12/01/2012       Family  History  History reviewed. No pertinent family history.    Social History  Social History    Tobacco Use      Smoking status: Never Smoker      Smokeless tobacco: Never Used       Is the Patient a current tobacco user? No    Allergies  No Known Allergies    Current Medications    Current Outpatient Medications:   •  acetaminophen (TYLENOL) 500 MG tablet, Take 500 mg by mouth Every 6 (Six) Hours As Needed for Mild Pain ., Disp: , Rfl:   •  apixaban (ELIQUIS) 5 MG tablet tablet, Take 5 mg by mouth 2 (Two) Times a Day., Disp: , Rfl:   •  busPIRone (BUSPAR) 10 MG tablet, Take 20 mg by mouth 2 (Two) Times a Day., Disp: , Rfl:   •  flecainide (TAMBOCOR) 50 MG tablet, Take 50 mg by mouth Every 12 (Twelve) Hours., Disp: , Rfl:   •  metoprolol succinate XL (TOPROL-XL) 50 MG 24 hr tablet, Take 1 tablet by mouth once daily, Disp: 30 tablet, Rfl: 0  •  multivitamin (MULTIPLE VITAMIN-FOLIC ACID PO), Take  by mouth., Disp: , Rfl:      Review of System  Review of Systems   Constitutional: Positive for unexpected weight gain.   HENT: Negative.    Eyes: Negative.    Respiratory: Negative.    Cardiovascular: Negative.    Gastrointestinal: Negative.    Endocrine: Negative.    Psychiatric/Behavioral: Positive for depressed mood.     I have reviewed and confirmed the accuracy of the ROS as documented by the MA/LPN/RN Yousuf Dhillon MD    Vitals:    02/14/22 1119   Resp: 16     Body mass index is 52.78 kg/m².    Objective     Physical Exam  Physical Exam  Constitutional:       Appearance: Normal appearance.   HENT:      Head: Normocephalic.   Eyes:      Extraocular Movements: Extraocular movements intact.   Pulmonary:      Effort: Pulmonary effort is normal.   Musculoskeletal:      Cervical back: Normal range of motion.   Neurological:      Mental Status: She is alert.         Assessment/Plan      This 51-year-old patient presented today for follow-up of anxiety with depression. Within the past 6 months she lost her significant other  of 10 years to Covid. She began seeing Elli Monteiro LCSW and was diagnosed with anxiety with depression.  She was also seen by [facility] and treated by nurse practitioner Miesha with BuSpar.  Patient relates that she is feeling significantly improved.  She is attending counseling sessions once a week.  Her weight is increased slightly to 337 pounds with a BMI of 52.8.  Overall her depressive state has significantly improved from my standpoint but she may still have some mild depression.  I will follow up with her in 6 months as she is receiving counseling and medication for this problem.        Diagnoses and all orders for this visit:    1. Anxiety associated with depression (Primary)  Comments:  Patient has made good progress with depression.  She will continue to see a counselor and [facility] for medication changes.      Plan:  1.)  Follow-up in 5 to 6 months for reevaluation of this anxiety and depression.  2.)  She is urged to keep her follow-up appointments with her counselor and [facility].       Yousuf Dhillon MD  02/14/2022

## 2022-02-17 RX ORDER — METOPROLOL SUCCINATE 50 MG/1
TABLET, EXTENDED RELEASE ORAL
Qty: 30 TABLET | Refills: 5 | Status: SHIPPED | OUTPATIENT
Start: 2022-02-17

## 2022-03-24 ENCOUNTER — OFFICE VISIT (OUTPATIENT)
Dept: FAMILY MEDICINE CLINIC | Facility: CLINIC | Age: 52
End: 2022-03-24

## 2022-03-24 VITALS — OXYGEN SATURATION: 99 % | HEIGHT: 67 IN | BODY MASS INDEX: 45.99 KG/M2 | HEART RATE: 58 BPM | WEIGHT: 293 LBS

## 2022-03-24 DIAGNOSIS — R05.9 COUGH: Primary | ICD-10-CM

## 2022-03-24 DIAGNOSIS — J30.1 ALLERGIC RHINITIS DUE TO POLLEN, UNSPECIFIED SEASONALITY: ICD-10-CM

## 2022-03-24 LAB
EXPIRATION DATE: NORMAL
FLUAV AG UPPER RESP QL IA.RAPID: NOT DETECTED
FLUBV AG UPPER RESP QL IA.RAPID: NOT DETECTED
INTERNAL CONTROL: NORMAL
Lab: NORMAL
SARS-COV-2 AG UPPER RESP QL IA.RAPID: NOT DETECTED

## 2022-03-24 PROCEDURE — 99214 OFFICE O/P EST MOD 30 MIN: CPT | Performed by: INTERNAL MEDICINE

## 2022-03-24 PROCEDURE — 87428 SARSCOV & INF VIR A&B AG IA: CPT | Performed by: INTERNAL MEDICINE

## 2022-03-24 RX ORDER — ASCORBIC ACID 500 MG
500 TABLET ORAL AS NEEDED
COMMUNITY

## 2022-03-24 RX ORDER — FLUTICASONE PROPIONATE 50 MCG
2 SPRAY, SUSPENSION (ML) NASAL DAILY
Qty: 16 G | Refills: 1 | Status: SHIPPED | OUTPATIENT
Start: 2022-03-24 | End: 2022-09-12

## 2022-03-24 RX ORDER — MONTELUKAST SODIUM 10 MG/1
10 TABLET ORAL NIGHTLY
Qty: 30 TABLET | Refills: 1 | Status: SHIPPED | OUTPATIENT
Start: 2022-03-24 | End: 2022-10-11

## 2022-03-24 NOTE — PROGRESS NOTES
Subjective Chief complaint is sinus problem  Sarah Frey is a 51 y.o. female.     History of Present Illness Sarah is here today for some respiratory issues.  Her symptoms began as sneezing approximately 2 days ago.  It then progressed to nasal congestion and drainage as well as a headache.  She is also having a sore throat.  She is not having fever or chills.  She is not having any myalgias or arthralgias.  She is not having any nausea vomiting or diarrhea.    The following portions of the patient's history were reviewed and updated as appropriate: allergies, current medications, past family history, past medical history, past social history, past surgical history and problem list.    Review of Systems   Constitutional: Negative for chills and fever.   HENT: Positive for congestion, postnasal drip, rhinorrhea and sinus pressure.        Objective   Physical Exam  Vitals and nursing note reviewed.   HENT:      Right Ear: Tympanic membrane and ear canal normal.      Left Ear: Tympanic membrane and ear canal normal.      Nose:      Comments: She has some bilateral nasal congestion.  There is no significant erythema.  Rhinorrhea is clear     Mouth/Throat:      Mouth: Mucous membranes are moist.      Pharynx: Oropharynx is clear. No oropharyngeal exudate or posterior oropharyngeal erythema.   Cardiovascular:      Rate and Rhythm: Normal rate and regular rhythm.   Pulmonary:      Effort: Pulmonary effort is normal.      Breath sounds: No wheezing, rhonchi or rales.   Chest:      Chest wall: No tenderness.   Lymphadenopathy:      Cervical: No cervical adenopathy.   Neurological:      Mental Status: She is alert.           Assessment/Plan   Diagnoses and all orders for this visit:    1. Cough (Primary)  -     POCT SARS-CoV-2 Antigen CHRISTOPHER    2. Allergic rhinitis due to pollen, unspecified seasonality      Sarah is here today for respiratory symptoms.  That currently of short duration I do not think she has a sinus  infection.  This appears more like allergy.  She does have some heart related issues and therefore we cannot use any pseudoephedrine.  I am going to prescribe some generic Singulair and Flonase.  I did advise her that if her symptoms persist through the weekend to contact Dr. Dao next week

## 2022-09-12 ENCOUNTER — OFFICE VISIT (OUTPATIENT)
Dept: FAMILY MEDICINE CLINIC | Facility: CLINIC | Age: 52
End: 2022-09-12

## 2022-09-12 VITALS — BODY MASS INDEX: 45.99 KG/M2 | WEIGHT: 293 LBS | HEIGHT: 67 IN | RESPIRATION RATE: 16 BRPM

## 2022-09-12 DIAGNOSIS — F34.1 DYSTHYMIA: ICD-10-CM

## 2022-09-12 DIAGNOSIS — I47.1 PAT (PAROXYSMAL ATRIAL TACHYCARDIA): ICD-10-CM

## 2022-09-12 DIAGNOSIS — I10 BENIGN ESSENTIAL HYPERTENSION: ICD-10-CM

## 2022-09-12 DIAGNOSIS — K14.8 LESION OF TONGUE: Primary | ICD-10-CM

## 2022-09-12 PROBLEM — Z79.01 CHRONIC ANTICOAGULATION: Status: ACTIVE | Noted: 2022-06-30

## 2022-09-12 PROCEDURE — 99214 OFFICE O/P EST MOD 30 MIN: CPT | Performed by: INTERNAL MEDICINE

## 2022-09-12 RX ORDER — HYDROCHLOROTHIAZIDE 25 MG/1
25 TABLET ORAL DAILY
COMMUNITY

## 2022-09-12 NOTE — PROGRESS NOTES
09/12/2022    CC: Anxiety (F/U) and spot on tongue (Noticed by dentist.  No pain.---no other issues)  .        HPI  Anxiety  Presents for follow-up visit. Symptoms include nervous/anxious behavior. The quality of sleep is good. Nighttime awakenings: occasional.     Compliance with medications is %.        Renny Frey is a 51 y.o. female.      The following portions of the patient's history were reviewed and updated as appropriate: allergies, current medications, past family history, past medical history, past social history, past surgical history and problem list.    Problem List  Patient Active Problem List   Diagnosis   • Asthma   • Low back pain   • Deep vein thrombosis of left lower extremity (HCC)   • Obstructive sleep apnea syndrome   • Paroxysmal atrial fibrillation (HCC)   • Major depression, single episode   • Excessive or frequent menstruation   • Polyp of corpus uteri   • Benign essential hypertension   • Mitral valve regurgitation   • Obesity   • Encounter for gynecological examination   • History of deep vein thrombosis (DVT) of lower extremity   • Allergic rhinitis   • Arthritis   • Personal history of other venous thrombosis and embolism   • Polyphagia   • Viral hepatitis C   • Acute respiratory failure with hypoxia (HCC)   • Cardiomegaly   • Pneumonia due to COVID-19 virus   • Hypertensive disorder   • Chronic anticoagulation       Past Medical History  Past Medical History:   Diagnosis Date   • Asthma 2/3/2020   • Deep vein thrombosis of left lower extremity (HCC) 3/19/2018   • Essential hypertension, benign 4/28/2011   • Excessive or frequent menstruation 7/24/2020   • Low back pain 5/6/2019   • Major depressive disorder, single episode, unspecified 11/5/2012   • Mitral valve insufficiency 1/1/2008   • Obesity, unspecified 7/24/2020   • Obstructive sleep apnea (adult) (pediatric) 3/19/2018   • Paroxysmal atrial fibrillation (HCC) 3/27/2017   • Polyp of corpus uteri 9/25/2012        Surgical History  Past Surgical History:   Procedure Laterality Date   • TONSILLECTOMY  12/01/2012       Family History  History reviewed. No pertinent family history.    Social History  Social History    Tobacco Use      Smoking status: Never Smoker      Smokeless tobacco: Never Used       Is the Patient a current tobacco user? No    Allergies  No Known Allergies    Current Medications    Current Outpatient Medications:   •  acetaminophen (TYLENOL) 500 MG tablet, Take 500 mg by mouth Every 6 (Six) Hours As Needed for Mild Pain ., Disp: , Rfl:   •  apixaban (ELIQUIS) 5 MG tablet tablet, Take 5 mg by mouth 2 (Two) Times a Day., Disp: , Rfl:   •  ascorbic acid (VITAMIN C) 500 MG tablet, Take 500 mg by mouth Daily., Disp: , Rfl:   •  busPIRone (BUSPAR) 15 MG tablet, Take 15 mg by mouth 2 (Two) Times a Day., Disp: , Rfl:   •  flecainide (TAMBOCOR) 50 MG tablet, Take 50 mg by mouth Every 12 (Twelve) Hours., Disp: , Rfl:   •  hydroCHLOROthiazide (HYDRODIURIL) 25 MG tablet, Take 25 mg by mouth Daily., Disp: , Rfl:   •  metoprolol succinate XL (TOPROL-XL) 50 MG 24 hr tablet, Take 1 tablet by mouth once daily, Disp: 30 tablet, Rfl: 5  •  montelukast (Singulair) 10 MG tablet, Take 1 tablet by mouth Every Night., Disp: 30 tablet, Rfl: 1  •  multivitamin (THERAGRAN) tablet tablet, Take  by mouth., Disp: , Rfl:      Review of System  Review of Systems   Constitutional: Negative.    HENT: Negative.    Eyes: Negative.    Respiratory: Negative.    Cardiovascular: Negative.    Gastrointestinal: Negative.    Psychiatric/Behavioral: The patient is nervous/anxious.      I have reviewed and confirmed the accuracy of the ROS as documented by the MA/LPN/RN Yousuf Dhillon MD    Vitals:    09/12/22 1006   Resp: 16     Body mass index is 48.87 kg/m².    Objective     Physical Exam  Physical Exam  Constitutional:       Appearance: Normal appearance.   HENT:      Head: Normocephalic.      Nose: Nose normal.   Cardiovascular:      Rate  and Rhythm: Normal rate. Rhythm irregular.      Pulses: Normal pulses.      Heart sounds: Normal heart sounds.   Pulmonary:      Effort: Pulmonary effort is normal.      Breath sounds: Normal breath sounds.   Musculoskeletal:      Cervical back: Normal range of motion and neck supple.   Neurological:      General: No focal deficit present.      Mental Status: She is alert.         Assessment & Plan    This 51-year-old who works as a  presents at this time for follow-up for anxiety/depression.  She lost her significant help other about 1-1/2 years or so ago.  She relates that she has started seeing a therapist and indeed she sees them monthly.  She is currently on BuSpar 15 mg p.o. daily.  She has a history of atrial fibrillation and is on Eliquis 5 mg p.o. daily and flecainide.    She was last seen by me in March 2022.  She has been treated for anxiety with depression at that time.    Note is made that she is lost 11 pounds since her last visit.  She is status post lap band surgery    Overall I think she is doing very well I am especially pleased that she is seeing a therapist at this point and is on BuSpar 15 mg p.o. daily.  Her affect was appropriate.    Had a dental visit several months ago patient dentist made note of a lesion on the right side of her tongue.  She relates that since then his size has not increased but she has little feeling over the right side of her tongue.  Evaluation shows it to be dark almost black.  It is in a linear fashion going lengthwise along the right side of the tongue about 4 cm.  There is no lymphadenopathy.  There are no other oral lesions seen.  I am concerned about this oral lesion as a possibility of neoplasm is present.  I attempted to make referrals to oral surgeons but as all did not accept medical insurance they suggested that she call her insurer and find who they recommended for being in network.    Patient's blood pressure was 136/92 in the left arm with the  thigh cuff.      Diagnoses and all orders for this visit:    1. Lesion of tongue (Primary)  -     Ambulatory Referral to Oral Maxillofacial Surgery    2. Dysthymia    3. PAT (paroxysmal atrial tachycardia) (HCC)    4. Benign essential hypertension      Plan:  1.)  Continue current medication for hypertension  2.)  Follow-up in 1 to 2 months pending oral surgeon evaluation.       Yousuf Dhillon MD  09/12/2022

## 2022-09-19 ENCOUNTER — TELEPHONE (OUTPATIENT)
Dept: FAMILY MEDICINE CLINIC | Facility: CLINIC | Age: 52
End: 2022-09-19

## 2022-09-19 DIAGNOSIS — K14.8 TONGUE LESION: Primary | ICD-10-CM

## 2022-09-19 NOTE — TELEPHONE ENCOUNTER
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 TAMI LOZANO    What is the office location: [address] [place] [address]    What is the office phone number: [phone]    Any additional details: PATIENT HAS SPOKE WITH HER INSURANCE AND THEY STATED THAT SHE WILL NEED A REFERRAL.

## 2022-10-08 ENCOUNTER — HOSPITAL ENCOUNTER (EMERGENCY)
Facility: HOSPITAL | Age: 52
Discharge: HOME | End: 2022-10-08
Attending: EMERGENCY MEDICINE | Admitting: EMERGENCY MEDICINE

## 2022-10-08 VITALS
OXYGEN SATURATION: 100 % | TEMPERATURE: 98.9 F | RESPIRATION RATE: 16 BRPM | SYSTOLIC BLOOD PRESSURE: 165 MMHG | HEART RATE: 50 BPM | HEIGHT: 67 IN | BODY MASS INDEX: 45.99 KG/M2 | DIASTOLIC BLOOD PRESSURE: 98 MMHG | WEIGHT: 293 LBS

## 2022-10-08 DIAGNOSIS — R55 NEAR SYNCOPE: Primary | ICD-10-CM

## 2022-10-08 DIAGNOSIS — I48.0 PAROXYSMAL ATRIAL FIBRILLATION: ICD-10-CM

## 2022-10-08 LAB
ALBUMIN SERPL-MCNC: 4.1 G/DL (ref 3.5–5.2)
ALBUMIN/GLOB SERPL: 1.5 G/DL
ALP SERPL-CCNC: 79 U/L (ref 39–117)
ALT SERPL W P-5'-P-CCNC: 11 U/L (ref 1–33)
ANION GAP SERPL CALCULATED.3IONS-SCNC: 10.7 MMOL/L (ref 5–15)
AST SERPL-CCNC: 16 U/L (ref 1–32)
BASOPHILS # BLD AUTO: 0.02 10*3/MM3 (ref 0–0.2)
BASOPHILS NFR BLD AUTO: 0.2 % (ref 0–1.5)
BILIRUB SERPL-MCNC: 0.5 MG/DL (ref 0–1.2)
BUN SERPL-MCNC: 12 MG/DL (ref 6–20)
BUN/CREAT SERPL: 12.5 (ref 7–25)
CALCIUM SPEC-SCNC: 9.5 MG/DL (ref 8.6–10.5)
CHLORIDE SERPL-SCNC: 104 MMOL/L (ref 98–107)
CO2 SERPL-SCNC: 27.3 MMOL/L (ref 22–29)
CREAT SERPL-MCNC: 0.96 MG/DL (ref 0.57–1)
DEPRECATED RDW RBC AUTO: 44.2 FL (ref 37–54)
EGFRCR SERPLBLD CKD-EPI 2021: 72.7 ML/MIN/1.73
EOSINOPHIL # BLD AUTO: 0.07 10*3/MM3 (ref 0–0.4)
EOSINOPHIL NFR BLD AUTO: 0.7 % (ref 0.3–6.2)
ERYTHROCYTE [DISTWIDTH] IN BLOOD BY AUTOMATED COUNT: 13.5 % (ref 12.3–15.4)
GLOBULIN UR ELPH-MCNC: 2.8 GM/DL
GLUCOSE SERPL-MCNC: 88 MG/DL (ref 65–99)
HCT VFR BLD AUTO: 40.7 % (ref 34–46.6)
HGB BLD-MCNC: 13.5 G/DL (ref 12–15.9)
IMM GRANULOCYTES # BLD AUTO: 0.04 10*3/MM3 (ref 0–0.05)
IMM GRANULOCYTES NFR BLD AUTO: 0.4 % (ref 0–0.5)
LYMPHOCYTES # BLD AUTO: 2.13 10*3/MM3 (ref 0.7–3.1)
LYMPHOCYTES NFR BLD AUTO: 20 % (ref 19.6–45.3)
MCH RBC QN AUTO: 29.9 PG (ref 26.6–33)
MCHC RBC AUTO-ENTMCNC: 33.2 G/DL (ref 31.5–35.7)
MCV RBC AUTO: 90.2 FL (ref 79–97)
MONOCYTES # BLD AUTO: 1.03 10*3/MM3 (ref 0.1–0.9)
MONOCYTES NFR BLD AUTO: 9.7 % (ref 5–12)
NEUTROPHILS NFR BLD AUTO: 69 % (ref 42.7–76)
NEUTROPHILS NFR BLD AUTO: 7.34 10*3/MM3 (ref 1.7–7)
NRBC BLD AUTO-RTO: 0 /100 WBC (ref 0–0.2)
NT-PROBNP SERPL-MCNC: 974 PG/ML (ref 0–450)
PLATELET # BLD AUTO: 220 10*3/MM3 (ref 140–450)
PMV BLD AUTO: 10.5 FL (ref 6–12)
POTASSIUM SERPL-SCNC: 4.1 MMOL/L (ref 3.5–5.2)
PROT SERPL-MCNC: 6.9 G/DL (ref 6–8.5)
QT INTERVAL: 466 MS
RBC # BLD AUTO: 4.51 10*6/MM3 (ref 3.77–5.28)
SODIUM SERPL-SCNC: 142 MMOL/L (ref 136–145)
TROPONIN T SERPL-MCNC: <0.01 NG/ML (ref 0–0.03)
WBC NRBC COR # BLD: 10.63 10*3/MM3 (ref 3.4–10.8)

## 2022-10-08 PROCEDURE — 99284 EMERGENCY DEPT VISIT MOD MDM: CPT

## 2022-10-08 PROCEDURE — 36415 COLL VENOUS BLD VENIPUNCTURE: CPT

## 2022-10-08 PROCEDURE — 93005 ELECTROCARDIOGRAM TRACING: CPT | Performed by: EMERGENCY MEDICINE

## 2022-10-08 PROCEDURE — 83880 ASSAY OF NATRIURETIC PEPTIDE: CPT | Performed by: EMERGENCY MEDICINE

## 2022-10-08 PROCEDURE — 80053 COMPREHEN METABOLIC PANEL: CPT | Performed by: EMERGENCY MEDICINE

## 2022-10-08 PROCEDURE — 84484 ASSAY OF TROPONIN QUANT: CPT | Performed by: EMERGENCY MEDICINE

## 2022-10-08 PROCEDURE — 85025 COMPLETE CBC W/AUTO DIFF WBC: CPT | Performed by: EMERGENCY MEDICINE

## 2022-10-08 PROCEDURE — 93010 ELECTROCARDIOGRAM REPORT: CPT | Performed by: INTERNAL MEDICINE

## 2022-10-08 NOTE — ED NOTES
"Pt to ED from via EMS c/o near syncopal episode today aprox 1800, pt sates she suddenly felt light headed and nauseas \"I felt like I was going to pass out\" and had to sit down, pt states it went away and then returned c diaphoresis and tingling in both hands,  pt reports hx HTN, pt denies SOA, states she had \"fluttering\" in left anterior chest but no CP, pt a/o x 4 at this time    PPE per protocol utilized throughout entire patient encounter.     "

## 2022-10-08 NOTE — ED PROVIDER NOTES
EMERGENCY DEPARTMENT ENCOUNTER    Room Number:  21/21  Date of encounter:  10/16/2022  PCP: Yousuf Dhillon MD  Historian: Patient      HPI:  Chief Complaint: Near syncope    A complete HPI/ROS/PMH/PSH/SH/FH are unobtainable due to: N/A    Context: Sarah Frey is a 51 y.o. female who presents to the ED c/o near syncopal episode just prior to arrival.  She states she was driving and felt dizzy, nauseated and lightheaded.  She had forgotten to take all of her usual medications this morning so she took them immediately (including flecainide and Xarelto).  Shortly thereafter, she started feeling worse-she had tunnel vision and felt like she was going to pass out so she pulled over and called 911.  Currently, she feels well-her symptoms lasted a few minutes.  She did report a rapid racing heart rate at the time..  She has not had any problems the past few days-no fevers or chills, no nausea, vomiting or diarrhea.  No shortness of breath or chest pain.  However, she did neglected to take her flecainide and other medications Wednesday and Thursday.  Symptoms were initially severe but has since resolved.  There were no aggravating relieving factors.  This reminds her of when she was first diagnosed with atrial fibrillation.      Summary of prior records: Patient has a history of paroxysmal atrial fibrillation, DVT, sleep apnea and hypertension.  She takes flecainide and Eliquis.  No recent ER visits/hospitalizations at [facility].    The patient was placed in a mask in triage, hand hygiene was performed before and after my interaction with the patient.  I wore a mask, safety glasses and gloves during my entire interaction with the patient.    PAST MEDICAL HISTORY  Active Ambulatory Problems     Diagnosis Date Noted   • Asthma 02/03/2020   • Low back pain 05/06/2019   • Deep vein thrombosis of left lower extremity (HCC) 03/19/2018   • Obstructive sleep apnea syndrome 03/19/2018   • Paroxysmal atrial  fibrillation (HCC) 03/27/2017   • Major depression, single episode 11/05/2012   • Excessive or frequent menstruation 07/24/2020   • Polyp of corpus uteri 09/25/2012   • Benign essential hypertension 04/28/2011   • Mitral valve regurgitation 01/01/2008   • Obesity 04/18/2016   • Encounter for gynecological examination 11/08/2019   • History of deep vein thrombosis (DVT) of lower extremity 07/24/2020   • Allergic rhinitis 03/16/2020   • Arthritis 03/16/2020   • Personal history of other venous thrombosis and embolism 07/24/2020   • Polyphagia 12/23/2020   • Viral hepatitis C 04/27/2016   • Acute respiratory failure with hypoxia (HCC) 08/09/2021   • Cardiomegaly 08/09/2021   • Pneumonia due to COVID-19 virus 08/09/2021   • Hypertensive disorder 02/28/2022   • Chronic anticoagulation 06/30/2022     Resolved Ambulatory Problems     Diagnosis Date Noted   • No Resolved Ambulatory Problems     Past Medical History:   Diagnosis Date   • Essential hypertension, benign 4/28/2011   • Major depressive disorder, single episode, unspecified 11/5/2012   • Mitral valve insufficiency 1/1/2008   • Obesity, unspecified 7/24/2020   • Obstructive sleep apnea (adult) (pediatric) 3/19/2018         PAST SURGICAL HISTORY  Past Surgical History:   Procedure Laterality Date   • TONSILLECTOMY  12/01/2012         FAMILY HISTORY  History reviewed. No pertinent family history.      SOCIAL HISTORY  Social History     Socioeconomic History   • Marital status:    Tobacco Use   • Smoking status: Never   • Smokeless tobacco: Never   Substance and Sexual Activity   • Alcohol use: Yes     Comment: occas   • Drug use: Never   • Sexual activity: Yes     Partners: Male         ALLERGIES  Patient has no known allergies.        REVIEW OF SYSTEMS  Review of Systems   Constitutional: Negative for chills and fever.   Respiratory: Negative for chest tightness and shortness of breath.    Cardiovascular: Positive for palpitations. Negative for chest pain.    Gastrointestinal: Negative for abdominal pain, diarrhea, nausea and vomiting.   Neurological: Positive for syncope.        All systems reviewed and negative except for those discussed in HPI.       PHYSICAL EXAM    I have reviewed the triage vital signs and nursing notes.    ED Triage Vitals [10/08/22 1916]   Temp Heart Rate Resp BP SpO2   98.9 °F (37.2 °C) 76 16 107/56 100 %      Temp src Heart Rate Source Patient Position BP Location FiO2 (%)   Tympanic -- -- -- --       Physical Exam   Constitutional: Pt. is oriented to person, place, and time and well-developed, well-nourished, and in no distress.   HENT: Normocephalic and atraumatic.   Neck: Normal range of motion. Neck supple. No JVD present.   Cardiovascular: Normal rate, regular rhythm and normal heart sounds. No murmur heard.  Pulmonary/Chest: Effort normal and breath sounds normal. No stridor. No respiratory distress. No wheezes, no rales.   Abdominal: Soft. Bowel sounds are normal. No distension. There is no tenderness. There is no rebound and no guarding.   Musculoskeletal: Normal range of motion. No edema, tenderness or deformity.   Neurological: Pt. is alert and oriented to person, place, and time.  She has no focal neurologic deficits  Skin: Skin is warm and dry. No rash noted. Pt. is not diaphoretic. No erythema.   Psychiatric: Mood, affect and judgment normal.  She is pleasant and cooperative.  Nursing note and vitals reviewed.        LAB RESULTS  No results found for this or any previous visit (from the past 24 hour(s)).    Ordered the above labs and independently reviewed the results.        RADIOLOGY  No Radiology Exams Resulted Within Past 24 Hours    I ordered the above noted radiological studies. Reviewed by me and discussed with radiologist.  See dictation for official radiology interpretation.      PROCEDURES    Procedures      MEDICATIONS GIVEN IN ER    Medications - No data to display      PROGRESS, DATA ANALYSIS, CONSULTS, AND MEDICAL  DECISION MAKING    Any/all labs have been independently reviewed by me.  Any/all radiology studies have been reviewed by me and discussed with radiologist dictating the report.   EKG's independently viewed and interpreted by me.  Discussion below represents my analysis of pertinent findings related to patient's condition, differential diagnosis, treatment plan and final disposition.    Number of Diagnoses or Management Options     Amount and/or Complexity of Data Reviewed  Clinical lab tests: Yes  Tests in the radiology section of CPT®:  No  Tests in the medicine section of CPT®:  No  Review and summarize past medical records:  (Yes - see HPI)  Independent visualization of images, tracings, or specimens: (Yes - see below)      ED Course as of 10/16/22 1152   Sat Oct 08, 2022   1919 Differential diagnosis for syncope includes but is not limited to:  Vasovagal reflex - situational stimulus, micturition, defecation, cough, sneezing, swallowing, postprandial state, react sinus hypersensitivity  Vascular-prolonged recumbency, sudden postural change, prolonged standing, hypovolemia, vasodilator drugs, autonomic neuropathy, adrenal insufficiency, subclavian steal, pulmonary embolism  Cardiac -arrhythmia, heart block, myocardial infarction, aortic stenosis, cardiac myxoma, cardiac, LV Dysfunction, Aortic Dissection, Pulmonary Hypertension, Pulmonary Stenosis, Pacemaker Failure  CNS-seizure, hypoxia, hypoglycemia, TIA,(basal vertebral), hydrocephalus [WC]   1943 EKG performed at 1935 and interpreted by me shows normal sinus rhythm with a heart of 53 bpm.  MN intervals, QRS complexes and ST-T segments are unremarkable.  There are no prior EKGs available for comparison. [WC]      ED Course User Index  [WC] Yunier Figueredo MD       AS OF 11:52 EDT VITALS:    BP - 165/98  HR - 50  TEMP - 98.9 °F (37.2 °C) (Tympanic)  02 SATS - 100%        DIAGNOSIS  Final diagnoses:   Near syncope   Paroxysmal atrial fibrillation (HCC)          DISPOSITION  Discharged          Note Disclaimer: At [facility], we believe that sharing information builds trust and better relationships. You are receiving this note because you recently visited [facility]. It is possible you will see health information before a provider has talked with you about it. This kind of information can be easy to misunderstand. To help you fully understand what it means for your health, we urge you to discuss this note with your provider.\         Yunier Figueredo MD  10/08/22 2155       Yunier Figueredo MD  10/16/22 9445

## 2022-10-08 NOTE — ED NOTES
Patient arrived to ED via EMS. Was driving and experienced a near syncopal episode which lasted less than 5 minutes. Was able to get to her sisters house & called EMS. Not diabetic. Has not eaten all day, not much fluids. Hx of dvt.

## 2022-10-11 ENCOUNTER — APPOINTMENT (OUTPATIENT)
Dept: CT IMAGING | Facility: HOSPITAL | Age: 52
End: 2022-10-11

## 2022-10-11 ENCOUNTER — TELEPHONE (OUTPATIENT)
Dept: FAMILY MEDICINE CLINIC | Facility: CLINIC | Age: 52
End: 2022-10-11

## 2022-10-11 ENCOUNTER — HOSPITAL ENCOUNTER (EMERGENCY)
Facility: HOSPITAL | Age: 52
Discharge: HOME | End: 2022-10-11
Attending: EMERGENCY MEDICINE | Admitting: EMERGENCY MEDICINE

## 2022-10-11 ENCOUNTER — APPOINTMENT (OUTPATIENT)
Dept: GENERAL RADIOLOGY | Facility: HOSPITAL | Age: 52
End: 2022-10-11

## 2022-10-11 VITALS
TEMPERATURE: 98.3 F | RESPIRATION RATE: 18 BRPM | BODY MASS INDEX: 48.82 KG/M2 | WEIGHT: 293 LBS | SYSTOLIC BLOOD PRESSURE: 138 MMHG | DIASTOLIC BLOOD PRESSURE: 75 MMHG | HEART RATE: 53 BPM | OXYGEN SATURATION: 100 % | HEIGHT: 65 IN

## 2022-10-11 DIAGNOSIS — Z79.01 CHRONIC ANTICOAGULATION: ICD-10-CM

## 2022-10-11 DIAGNOSIS — R07.81 PLEURITIC CHEST PAIN: Primary | ICD-10-CM

## 2022-10-11 DIAGNOSIS — Z86.718 HISTORY OF DVT IN ADULTHOOD: ICD-10-CM

## 2022-10-11 DIAGNOSIS — Z86.79 HISTORY OF ATRIAL FIBRILLATION: ICD-10-CM

## 2022-10-11 LAB
ALBUMIN SERPL-MCNC: 4.1 G/DL (ref 3.5–5.2)
ALBUMIN/GLOB SERPL: 1.5 G/DL
ALP SERPL-CCNC: 82 U/L (ref 39–117)
ALT SERPL W P-5'-P-CCNC: 14 U/L (ref 1–33)
ANION GAP SERPL CALCULATED.3IONS-SCNC: 8.1 MMOL/L (ref 5–15)
AST SERPL-CCNC: 16 U/L (ref 1–32)
BASOPHILS # BLD AUTO: 0.02 10*3/MM3 (ref 0–0.2)
BASOPHILS NFR BLD AUTO: 0.3 % (ref 0–1.5)
BILIRUB SERPL-MCNC: 0.5 MG/DL (ref 0–1.2)
BUN SERPL-MCNC: 12 MG/DL (ref 6–20)
BUN/CREAT SERPL: 13.8 (ref 7–25)
CALCIUM SPEC-SCNC: 9.3 MG/DL (ref 8.6–10.5)
CHLORIDE SERPL-SCNC: 100 MMOL/L (ref 98–107)
CO2 SERPL-SCNC: 27.9 MMOL/L (ref 22–29)
CREAT SERPL-MCNC: 0.87 MG/DL (ref 0.57–1)
D DIMER PPP FEU-MCNC: <0.27 MCGFEU/ML (ref 0–0.49)
DEPRECATED RDW RBC AUTO: 46.2 FL (ref 37–54)
EGFRCR SERPLBLD CKD-EPI 2021: 81.8 ML/MIN/1.73
EOSINOPHIL # BLD AUTO: 0.13 10*3/MM3 (ref 0–0.4)
EOSINOPHIL NFR BLD AUTO: 1.7 % (ref 0.3–6.2)
ERYTHROCYTE [DISTWIDTH] IN BLOOD BY AUTOMATED COUNT: 13.4 % (ref 12.3–15.4)
GLOBULIN UR ELPH-MCNC: 2.8 GM/DL
GLUCOSE SERPL-MCNC: 89 MG/DL (ref 65–99)
HCT VFR BLD AUTO: 40.3 % (ref 34–46.6)
HGB BLD-MCNC: 13.2 G/DL (ref 12–15.9)
IMM GRANULOCYTES # BLD AUTO: 0.02 10*3/MM3 (ref 0–0.05)
IMM GRANULOCYTES NFR BLD AUTO: 0.3 % (ref 0–0.5)
LYMPHOCYTES # BLD AUTO: 2.07 10*3/MM3 (ref 0.7–3.1)
LYMPHOCYTES NFR BLD AUTO: 27 % (ref 19.6–45.3)
MCH RBC QN AUTO: 30.5 PG (ref 26.6–33)
MCHC RBC AUTO-ENTMCNC: 32.8 G/DL (ref 31.5–35.7)
MCV RBC AUTO: 93.1 FL (ref 79–97)
MONOCYTES # BLD AUTO: 0.93 10*3/MM3 (ref 0.1–0.9)
MONOCYTES NFR BLD AUTO: 12.1 % (ref 5–12)
NEUTROPHILS NFR BLD AUTO: 4.49 10*3/MM3 (ref 1.7–7)
NEUTROPHILS NFR BLD AUTO: 58.6 % (ref 42.7–76)
NRBC BLD AUTO-RTO: 0 /100 WBC (ref 0–0.2)
NT-PROBNP SERPL-MCNC: 40.4 PG/ML (ref 0–450)
PLATELET # BLD AUTO: 209 10*3/MM3 (ref 140–450)
PMV BLD AUTO: 11.2 FL (ref 6–12)
POTASSIUM SERPL-SCNC: 3.8 MMOL/L (ref 3.5–5.2)
PROCALCITONIN SERPL-MCNC: 0.02 NG/ML (ref 0–0.25)
PROT SERPL-MCNC: 6.9 G/DL (ref 6–8.5)
QT INTERVAL: 494 MS
RBC # BLD AUTO: 4.33 10*6/MM3 (ref 3.77–5.28)
SODIUM SERPL-SCNC: 136 MMOL/L (ref 136–145)
TROPONIN T SERPL-MCNC: <0.01 NG/ML (ref 0–0.03)
WBC NRBC COR # BLD: 7.66 10*3/MM3 (ref 3.4–10.8)

## 2022-10-11 PROCEDURE — 85379 FIBRIN DEGRADATION QUANT: CPT | Performed by: EMERGENCY MEDICINE

## 2022-10-11 PROCEDURE — 71275 CT ANGIOGRAPHY CHEST: CPT

## 2022-10-11 PROCEDURE — 84484 ASSAY OF TROPONIN QUANT: CPT | Performed by: EMERGENCY MEDICINE

## 2022-10-11 PROCEDURE — 83880 ASSAY OF NATRIURETIC PEPTIDE: CPT | Performed by: EMERGENCY MEDICINE

## 2022-10-11 PROCEDURE — 0 IOPAMIDOL PER 1 ML: Performed by: EMERGENCY MEDICINE

## 2022-10-11 PROCEDURE — 93010 ELECTROCARDIOGRAM REPORT: CPT | Performed by: INTERNAL MEDICINE

## 2022-10-11 PROCEDURE — 85025 COMPLETE CBC W/AUTO DIFF WBC: CPT | Performed by: EMERGENCY MEDICINE

## 2022-10-11 PROCEDURE — 93005 ELECTROCARDIOGRAM TRACING: CPT

## 2022-10-11 PROCEDURE — 99284 EMERGENCY DEPT VISIT MOD MDM: CPT

## 2022-10-11 PROCEDURE — 71045 X-RAY EXAM CHEST 1 VIEW: CPT

## 2022-10-11 PROCEDURE — 80053 COMPREHEN METABOLIC PANEL: CPT | Performed by: EMERGENCY MEDICINE

## 2022-10-11 PROCEDURE — 93005 ELECTROCARDIOGRAM TRACING: CPT | Performed by: EMERGENCY MEDICINE

## 2022-10-11 PROCEDURE — 84145 PROCALCITONIN (PCT): CPT | Performed by: EMERGENCY MEDICINE

## 2022-10-11 RX ORDER — SODIUM CHLORIDE 0.9 % (FLUSH) 0.9 %
10 SYRINGE (ML) INJECTION AS NEEDED
Status: DISCONTINUED | OUTPATIENT
Start: 2022-10-11 | End: 2022-10-11 | Stop reason: HOSPADM

## 2022-10-11 RX ADMIN — IOPAMIDOL 95 ML: 755 INJECTION, SOLUTION INTRAVENOUS at 13:44

## 2022-10-11 NOTE — TELEPHONE ENCOUNTER
Caller: Sarah Frey    Relationship: Self    Best call back number: [phone]    What was the call regarding: PATIENT WENT TO URGENT CARE TODAY AND THEY SAID SHE DOES NOT HAVE PNEUMONIA, FLU OR COVID. PLEASE ADVISE ON NEXT STEPS, PATIENT IS STILL HAVING PAIN WHEN SHE TAKES A DEEP BREATH.     PATIENT ALSO WANTS TO LET DR MC KNOW THAT SHE WENT TO THE HOSPITAL ON Saturday BY AMBULANCE, SHE WAS IN A-FIB.     Do you require a callback: YES

## 2022-10-11 NOTE — ED PROVIDER NOTES
EMERGENCY DEPARTMENT ENCOUNTER    Room Number:  18/18  Date of encounter:  10/11/2022  PCP: Yousuf Dhillon MD  Historian: Patient    Patient was placed in face mask during triage process. Patient was wearing facemask when I entered the room and throughout our encounter. I wore full protective equipment throughout this patient encounter including a face mask, eye protection, and gloves. Hand hygiene was performed before donning protective equipment and again following doffing of PPE after leaving the room.    HPI:  Chief Complaint: Painful respiration  A complete HPI/ROS/PMH/PSH/SH/FH are unobtainable due to: N/A   Context: Sarah Frey is a 51 y.o. female with a history of paroxysmal atrial fibrillation, essential hypertension, obesity, sleep apnea, mitral valve insufficiency, asthma, prior DVT and chronic anticoagulation who presents to the ED c/o sharp right-sided chest pain that hurts when she takes a deep breath for the last 2 days.  She has had some mild nonproductive cough with no hemoptysis or fevers.  She is also had some URI type symptoms.  She was actually seen here 3 days ago for a near syncopal episode that she felt was related to her atrial fibrillation.  She noted that she had not been as strict with taking all of her medications as they were prescribed though she does admit that she only takes her anticoagulation once a day rather than twice daily.  Patient went back to see her primary care since being seen in ED followed by a visit to urgent care and ultimately wound up back in the ED for evaluation.  Patient concerned for pulmonary embolism.  Pain minimal at rest in the bed at this time.      MEDICAL HISTORY REVIEW  EMR reviewed:  Seen in this ED 10/8/2022.    PAST MEDICAL HISTORY  Active Ambulatory Problems     Diagnosis Date Noted   • Asthma 02/03/2020   • Low back pain 05/06/2019   • Deep vein thrombosis of left lower extremity (HCC) 03/19/2018   • Obstructive sleep apnea syndrome  03/19/2018   • Paroxysmal atrial fibrillation (HCC) 03/27/2017   • Major depression, single episode 11/05/2012   • Excessive or frequent menstruation 07/24/2020   • Polyp of corpus uteri 09/25/2012   • Benign essential hypertension 04/28/2011   • Mitral valve regurgitation 01/01/2008   • Obesity 04/18/2016   • Encounter for gynecological examination 11/08/2019   • History of deep vein thrombosis (DVT) of lower extremity 07/24/2020   • Allergic rhinitis 03/16/2020   • Arthritis 03/16/2020   • Personal history of other venous thrombosis and embolism 07/24/2020   • Polyphagia 12/23/2020   • Viral hepatitis C 04/27/2016   • Acute respiratory failure with hypoxia (HCC) 08/09/2021   • Cardiomegaly 08/09/2021   • Pneumonia due to COVID-19 virus 08/09/2021   • Hypertensive disorder 02/28/2022   • Chronic anticoagulation 06/30/2022     Resolved Ambulatory Problems     Diagnosis Date Noted   • No Resolved Ambulatory Problems     Past Medical History:   Diagnosis Date   • Essential hypertension, benign 4/28/2011   • Major depressive disorder, single episode, unspecified 11/5/2012   • Mitral valve insufficiency 1/1/2008   • Obesity, unspecified 7/24/2020   • Obstructive sleep apnea (adult) (pediatric) 3/19/2018         PAST SURGICAL HISTORY  Past Surgical History:   Procedure Laterality Date   • TONSILLECTOMY  12/01/2012         FAMILY HISTORY  History reviewed. No pertinent family history.      SOCIAL HISTORY  Social History     Socioeconomic History   • Marital status:    Tobacco Use   • Smoking status: Never   • Smokeless tobacco: Never   Substance and Sexual Activity   • Alcohol use: Yes     Comment: occas   • Drug use: Never   • Sexual activity: Yes     Partners: Male         ALLERGIES  Patient has no known allergies.        REVIEW OF SYSTEMS  Review of Systems     All systems reviewed and negative except for those discussed in HPI.       PHYSICAL EXAM    I have reviewed the triage vital signs and nursing  notes.    ED Triage Vitals [10/11/22 1124]   Temp Heart Rate Resp BP SpO2   98.3 °F (36.8 °C) 90 18 -- 95 %      Temp src Heart Rate Source Patient Position BP Location FiO2 (%)   Tympanic Monitor -- -- --       Physical Exam    Physical Exam   Constitutional: No distress.  Pleasant and nontoxic.  HENT:  Head: Normocephalic and atraumatic.   Oropharynx: Mucous membranes are moist.   Eyes: No scleral icterus. No conjunctival pallor.  Neck: Painless range of motion noted. Neck supple.   Cardiovascular: Normal rate, regular rhythm and intact distal pulses.  Pulmonary/Chest: No respiratory distress. There are no wheezes, no rhonchi, and no rales.   Abdominal: Soft. There is no tenderness. There is no rebound and no guarding.   Musculoskeletal: Moves all extremities equally.  Chronic appearing bilateral lower extremity swelling without pitting edema or focal tenderness.   Neurological: Alert.  Baseline strength and sensation noted.   Skin: Skin is pink, warm, and dry. No pallor.   Psychiatric: Mood and affect normal.   Nursing note and vitals reviewed.    LAB RESULTS  Recent Results (from the past 24 hour(s))   ECG 12 Lead    Collection Time: 10/11/22 11:36 AM   Result Value Ref Range    QT Interval 494 ms   Comprehensive Metabolic Panel    Collection Time: 10/11/22 12:05 PM    Specimen: Arm, Right; Blood   Result Value Ref Range    Glucose 89 65 - 99 mg/dL    BUN 12 6 - 20 mg/dL    Creatinine 0.87 0.57 - 1.00 mg/dL    Sodium 136 136 - 145 mmol/L    Potassium 3.8 3.5 - 5.2 mmol/L    Chloride 100 98 - 107 mmol/L    CO2 27.9 22.0 - 29.0 mmol/L    Calcium 9.3 8.6 - 10.5 mg/dL    Total Protein 6.9 6.0 - 8.5 g/dL    Albumin 4.10 3.50 - 5.20 g/dL    ALT (SGPT) 14 1 - 33 U/L    AST (SGOT) 16 1 - 32 U/L    Alkaline Phosphatase 82 39 - 117 U/L    Total Bilirubin 0.5 0.0 - 1.2 mg/dL    Globulin 2.8 gm/dL    A/G Ratio 1.5 g/dL    BUN/Creatinine Ratio 13.8 7.0 - 25.0    Anion Gap 8.1 5.0 - 15.0 mmol/L    eGFR 81.8 >60.0 mL/min/1.73    Troponin    Collection Time: 10/11/22 12:05 PM    Specimen: Arm, Right; Blood   Result Value Ref Range    Troponin T <0.010 0.000 - 0.030 ng/mL   CBC Auto Differential    Collection Time: 10/11/22 12:05 PM    Specimen: Arm, Right; Blood   Result Value Ref Range    WBC 7.66 3.40 - 10.80 10*3/mm3    RBC 4.33 3.77 - 5.28 10*6/mm3    Hemoglobin 13.2 12.0 - 15.9 g/dL    Hematocrit 40.3 34.0 - 46.6 %    MCV 93.1 79.0 - 97.0 fL    MCH 30.5 26.6 - 33.0 pg    MCHC 32.8 31.5 - 35.7 g/dL    RDW 13.4 12.3 - 15.4 %    RDW-SD 46.2 37.0 - 54.0 fl    MPV 11.2 6.0 - 12.0 fL    Platelets 209 140 - 450 10*3/mm3    Neutrophil % 58.6 42.7 - 76.0 %    Lymphocyte % 27.0 19.6 - 45.3 %    Monocyte % 12.1 (H) 5.0 - 12.0 %    Eosinophil % 1.7 0.3 - 6.2 %    Basophil % 0.3 0.0 - 1.5 %    Immature Grans % 0.3 0.0 - 0.5 %    Neutrophils, Absolute 4.49 1.70 - 7.00 10*3/mm3    Lymphocytes, Absolute 2.07 0.70 - 3.10 10*3/mm3    Monocytes, Absolute 0.93 (H) 0.10 - 0.90 10*3/mm3    Eosinophils, Absolute 0.13 0.00 - 0.40 10*3/mm3    Basophils, Absolute 0.02 0.00 - 0.20 10*3/mm3    Immature Grans, Absolute 0.02 0.00 - 0.05 10*3/mm3    nRBC 0.0 0.0 - 0.2 /100 WBC   BNP    Collection Time: 10/11/22 12:05 PM    Specimen: Arm, Right; Blood   Result Value Ref Range    proBNP 40.4 0.0 - 450.0 pg/mL   D-dimer, Quantitative    Collection Time: 10/11/22 12:05 PM    Specimen: Arm, Right; Blood   Result Value Ref Range    D-Dimer, Quantitative <0.27 0.00 - 0.49 MCGFEU/mL   Procalcitonin    Collection Time: 10/11/22 12:05 PM    Specimen: Arm, Right; Blood   Result Value Ref Range    Procalcitonin 0.02 0.00 - 0.25 ng/mL       Ordered the above labs and independently reviewed the results.        RADIOLOGY  XR Chest 1 View    Result Date: 10/11/2022  PORTABLE CHEST  HISTORY: Chest pain.  This study is hampered by the patient's body habitus. A single view of the chest demonstrates the heart to be within normal limits in size. There is no evidence of a focal  infiltrate, area of consolidation, effusion or of congestive failure. There is minimal prominence of pulmonary interstitium at the lung bases bilaterally, accentuated by the level of inspiratory effort and by the patient's body habitus.        CT Angiogram Chest Pulmonary Embolism    Result Date: 10/11/2022  CT ANGIOGRAM OF THE CHEST WITH CONTRAST INCLUDING RECONSTRUCTION IMAGES 10/11/2022  HISTORY: Chest pain. Possible pulmonary embolus.  Following the intravenous contrast injection, CT angiography was performed through the chest. Sagittal, coronal and 3-D reconstruction images were reviewed.  The pulmonary arterial system is well opacified with no definite evidence of pulmonary embolus.  No pathologically enlarged lymph nodes are seen. No significant aortic aneurysm is seen.  Gastric band is seen. There are calcifications in the right hepatic lobe.  No lung masses or significant pulmonary infiltrates. Punctate hyperdense nodule seen in the right middle lobe on image 77, probably a tiny granuloma.      No evidence of pulmonary embolus.    Radiation dose reduction techniques were utilized, including automated exposure control and exposure modulation based on body size.  This report was finalized on 10/11/2022 3:27 PM by Dr. Benjamin Murphy M.D.        I ordered the above noted radiological studies. Reviewed by me and discussed with radiologist.  See dictation for official radiology interpretation.      PROCEDURES    Procedures        MEDICATIONS GIVEN IN ER    Medications   sodium chloride 0.9 % flush 10 mL (has no administration in time range)   iopamidol (ISOVUE-370) 76 % injection 100 mL (95 mL Intravenous Given by Other 10/11/22 1344)         PROGRESS, DATA ANALYSIS, CONSULTS, AND MEDICAL DECISION MAKING    My differential diagnosis for chest pain includes but is not limited to:  Muscle strain, costochondritis, myositis, pleurisy, rib fracture, intercostal neuritis, herpes zoster, tumor, myocardial infarction,  coronary syndrome, unstable angina, angina, aortic dissection, mitral valve prolapse, pericarditis, palpitations, pulmonary embolus, pneumonia, pneumothorax, lung cancer, GERD, esophagitis, esophageal spasm      All labs have been independently reviewed by me.  All radiology studies have been reviewed by me and discussed with radiologist dictating the report.   EKG's independently viewed and interpreted by me.  Discussion below represents my analysis of pertinent findings related to patient's condition, differential diagnosis, treatment plan and final disposition.      ED Course as of 10/11/22 1544   Tue Oct 11, 2022   1143 EKG           EKG time: 1136  Rhythm/Rate: Sinus, 55  P waves and NJ: Prominent P waves; SIMI within normal limits  QRS, axis: IVCD  ST and T waves: No STEMI; QTC within normal limits; V45 and 6 interpretation limited secondary to baseline wander    Interpreted Contemporaneously by me, independently viewed  Comparison: 10/8/2022   [RS]   1320 Procalcitonin: 0.02 [RS]   1320 WBC: 7.66 [RS]   1320 Hemoglobin: 13.2 [RS]   1320 proBNP: 40.4 [RS]   1429 BP(!): 82/63  Not real.  I personally repeated it after repositioning the patient's arm and adjusting the blood pressure cuff.  Systolic pressure 117.  Patient with no changes in mental status or complaints of symptoms symptoms. [RS]   1543 Reviewed all findings with patient.  She she feels reassured and is agreeable discharge planning at this time. [RS]      ED Course User Index  [RS] Sam Soto MD       AS OF 15:44 EDT VITALS:    BP - 129/94  HR - 92  TEMP - 98.3 °F (36.8 °C) (Tympanic)  O2 SATS - 99%        DIAGNOSIS  Final diagnoses:   Pleuritic chest pain   Chronic anticoagulation   History of DVT in adulthood   History of atrial fibrillation         DISPOSITION  DISCHARGE    Patient discharged in stable condition.    Reviewed implications of results, diagnosis, meds, responsibility to follow up, warning signs and symptoms of possible  worsening, potential complications and reasons to return to ER.    Patient/Family voiced understanding of above instructions.    Discussed plan for discharge, as there is no emergent indication for admission. Patient referred to primary care provider for regular health maintenance. Pt/family is agreeable and understands need for follow up and possible repeat testing.  Pt is aware that discharge does not mean that nothing is wrong but it indicates no emergency is present that requires admission and they must continue care with follow-up as given below or physician of their choice.     FOLLOW-UP  Yousuf Dhillon MD  [address] [facility] [address] [phone]    Schedule an appointment as soon as possible for a visit in 3 days  As needed, If symptoms fail to improve         Medication List      No changes were made to your prescriptions during this visit.            Sam Soto MD  10/11/22 1302

## 2022-10-11 NOTE — ED TRIAGE NOTES
All triage performed with this RN wearing appropriate PPE.  Pt placed in mask upon arrival to ED.    Patient c/o CP when breathing since Sunday. She also c/o some SOA.

## 2022-10-13 ENCOUNTER — TELEPHONE (OUTPATIENT)
Dept: FAMILY MEDICINE CLINIC | Facility: CLINIC | Age: 52
End: 2022-10-13

## 2022-10-13 NOTE — TELEPHONE ENCOUNTER
Patient is in urgent care and emergency room in the past 2 weeks with sensation of chest pain.  ER evaluation shows no evidence of any cardiac source or any pulmonary embolism.  But she continues to complain of discomfort in the front of the chest to the right of the breastbone.  I feel that this is probably costochondritis.  The patient however has had a previous history of gastrectomy and so I am reluctant to use NSAID's.  We will instead go with Tylenol extra strength 2 times a day and a heating pad to the affected area 15 to 20 minutes 3 times daily.  If no improvement in 1 week the patient will contact me.

## 2022-10-27 ENCOUNTER — TELEPHONE (OUTPATIENT)
Dept: FAMILY MEDICINE CLINIC | Facility: CLINIC | Age: 52
End: 2022-10-27

## 2022-10-27 RX ORDER — SULFACETAMIDE SODIUM 100 MG/ML
1 SOLUTION/ DROPS OPHTHALMIC
Qty: 10 ML | Refills: 0 | Status: SHIPPED | OUTPATIENT
Start: 2022-10-27 | End: 2022-12-13

## 2022-12-13 ENCOUNTER — OFFICE VISIT (OUTPATIENT)
Dept: FAMILY MEDICINE CLINIC | Facility: CLINIC | Age: 52
End: 2022-12-13

## 2022-12-13 VITALS — WEIGHT: 293 LBS | RESPIRATION RATE: 16 BRPM | HEIGHT: 65 IN | BODY MASS INDEX: 48.82 KG/M2

## 2022-12-13 DIAGNOSIS — R35.1 NOCTURIA: ICD-10-CM

## 2022-12-13 DIAGNOSIS — I10 PRIMARY HYPERTENSION: ICD-10-CM

## 2022-12-13 DIAGNOSIS — E03.9 HYPOTHYROIDISM, UNSPECIFIED TYPE: ICD-10-CM

## 2022-12-13 DIAGNOSIS — E78.01 FAMILIAL HYPERCHOLESTEROLEMIA: Primary | ICD-10-CM

## 2022-12-13 DIAGNOSIS — D64.9 ANEMIA, UNSPECIFIED TYPE: ICD-10-CM

## 2022-12-13 PROBLEM — I82.409 DEEP VENOUS THROMBOSIS: Status: ACTIVE | Noted: 2020-07-24

## 2022-12-13 PROCEDURE — 99396 PREV VISIT EST AGE 40-64: CPT | Performed by: INTERNAL MEDICINE

## 2022-12-14 LAB
ALBUMIN SERPL-MCNC: 4 G/DL (ref 3.5–5.2)
ALBUMIN/GLOB SERPL: 1.5 G/DL
ALP SERPL-CCNC: 82 U/L (ref 39–117)
ALT SERPL-CCNC: 9 U/L (ref 1–33)
AST SERPL-CCNC: 16 U/L (ref 1–32)
BASOPHILS # BLD AUTO: 0.02 10*3/MM3 (ref 0–0.2)
BASOPHILS NFR BLD AUTO: 0.2 % (ref 0–1.5)
BILIRUB SERPL-MCNC: 0.4 MG/DL (ref 0–1.2)
BUN SERPL-MCNC: 13 MG/DL (ref 6–20)
BUN/CREAT SERPL: 16.3 (ref 7–25)
CALCIUM SERPL-MCNC: 9.7 MG/DL (ref 8.6–10.5)
CHLORIDE SERPL-SCNC: 106 MMOL/L (ref 98–107)
CHOLEST SERPL-MCNC: 156 MG/DL (ref 0–200)
CHOLEST/HDLC SERPL: 3.63 {RATIO}
CO2 SERPL-SCNC: 27.2 MMOL/L (ref 22–29)
CREAT SERPL-MCNC: 0.8 MG/DL (ref 0.57–1)
EGFRCR SERPLBLD CKD-EPI 2021: 89.9 ML/MIN/1.73
EOSINOPHIL # BLD AUTO: 0.18 10*3/MM3 (ref 0–0.4)
EOSINOPHIL NFR BLD AUTO: 2 % (ref 0.3–6.2)
ERYTHROCYTE [DISTWIDTH] IN BLOOD BY AUTOMATED COUNT: 12.9 % (ref 12.3–15.4)
GLOBULIN SER CALC-MCNC: 2.7 GM/DL
GLUCOSE SERPL-MCNC: 85 MG/DL (ref 65–99)
HCT VFR BLD AUTO: 38.4 % (ref 34–46.6)
HDLC SERPL-MCNC: 43 MG/DL (ref 40–60)
HGB BLD-MCNC: 12.7 G/DL (ref 12–15.9)
IMM GRANULOCYTES # BLD AUTO: 0.03 10*3/MM3 (ref 0–0.05)
IMM GRANULOCYTES NFR BLD AUTO: 0.3 % (ref 0–0.5)
LDLC SERPL CALC-MCNC: 103 MG/DL (ref 0–100)
LYMPHOCYTES # BLD AUTO: 2.88 10*3/MM3 (ref 0.7–3.1)
LYMPHOCYTES NFR BLD AUTO: 32.5 % (ref 19.6–45.3)
MCH RBC QN AUTO: 29.5 PG (ref 26.6–33)
MCHC RBC AUTO-ENTMCNC: 33.1 G/DL (ref 31.5–35.7)
MCV RBC AUTO: 89.3 FL (ref 79–97)
MONOCYTES # BLD AUTO: 0.74 10*3/MM3 (ref 0.1–0.9)
MONOCYTES NFR BLD AUTO: 8.3 % (ref 5–12)
NEUTROPHILS # BLD AUTO: 5.02 10*3/MM3 (ref 1.7–7)
NEUTROPHILS NFR BLD AUTO: 56.7 % (ref 42.7–76)
NRBC BLD AUTO-RTO: 0 /100 WBC (ref 0–0.2)
PLATELET # BLD AUTO: 252 10*3/MM3 (ref 140–450)
POTASSIUM SERPL-SCNC: 4.4 MMOL/L (ref 3.5–5.2)
PROT SERPL-MCNC: 6.7 G/DL (ref 6–8.5)
RBC # BLD AUTO: 4.3 10*6/MM3 (ref 3.77–5.28)
SODIUM SERPL-SCNC: 143 MMOL/L (ref 136–145)
T4 FREE SERPL-MCNC: 1.38 NG/DL (ref 0.93–1.7)
TRIGL SERPL-MCNC: 48 MG/DL (ref 0–150)
TSH SERPL DL<=0.005 MIU/L-ACNC: 1.07 UIU/ML (ref 0.27–4.2)
VLDLC SERPL CALC-MCNC: 10 MG/DL (ref 5–40)
WBC # BLD AUTO: 8.87 10*3/MM3 (ref 3.4–10.8)

## 2022-12-15 ENCOUNTER — APPOINTMENT (OUTPATIENT)
Dept: CT IMAGING | Facility: HOSPITAL | Age: 52
End: 2022-12-15

## 2022-12-15 ENCOUNTER — HOSPITAL ENCOUNTER (OUTPATIENT)
Facility: HOSPITAL | Age: 52
Setting detail: OBSERVATION
Discharge: HOME | End: 2022-12-17
Attending: EMERGENCY MEDICINE | Admitting: HOSPITALIST

## 2022-12-15 ENCOUNTER — APPOINTMENT (OUTPATIENT)
Dept: GENERAL RADIOLOGY | Facility: HOSPITAL | Age: 52
End: 2022-12-15

## 2022-12-15 DIAGNOSIS — R07.9 CHEST PAIN, UNSPECIFIED TYPE: ICD-10-CM

## 2022-12-15 DIAGNOSIS — R04.2 HEMOPTYSIS: ICD-10-CM

## 2022-12-15 DIAGNOSIS — J18.9 PNEUMONIA OF RIGHT LUNG DUE TO INFECTIOUS ORGANISM, UNSPECIFIED PART OF LUNG: Primary | ICD-10-CM

## 2022-12-15 PROBLEM — R93.2 ABNORMAL LIVER DIAGNOSTIC IMAGING: Status: ACTIVE | Noted: 2022-12-15

## 2022-12-15 PROBLEM — A41.9 SEPSIS DUE TO PNEUMONIA: Status: ACTIVE | Noted: 2022-12-15

## 2022-12-15 LAB
ALBUMIN SERPL-MCNC: 4 G/DL (ref 3.5–5.2)
ALBUMIN/GLOB SERPL: 1.6 G/DL
ALP SERPL-CCNC: 73 U/L (ref 39–117)
ALT SERPL W P-5'-P-CCNC: 13 U/L (ref 1–33)
ANION GAP SERPL CALCULATED.3IONS-SCNC: 11.8 MMOL/L (ref 5–15)
AST SERPL-CCNC: 20 U/L (ref 1–32)
B PARAPERT DNA SPEC QL NAA+PROBE: NOT DETECTED
B PERT DNA SPEC QL NAA+PROBE: NOT DETECTED
BASOPHILS # BLD AUTO: 0.04 10*3/MM3 (ref 0–0.2)
BASOPHILS NFR BLD AUTO: 0.2 % (ref 0–1.5)
BILIRUB SERPL-MCNC: 0.6 MG/DL (ref 0–1.2)
BUN SERPL-MCNC: 11 MG/DL (ref 6–20)
BUN/CREAT SERPL: 13.3 (ref 7–25)
C PNEUM DNA NPH QL NAA+NON-PROBE: NOT DETECTED
CALCIUM SPEC-SCNC: 9 MG/DL (ref 8.6–10.5)
CHLORIDE SERPL-SCNC: 103 MMOL/L (ref 98–107)
CO2 SERPL-SCNC: 24.2 MMOL/L (ref 22–29)
CREAT SERPL-MCNC: 0.83 MG/DL (ref 0.57–1)
D-LACTATE SERPL-SCNC: 1 MMOL/L (ref 0.5–2)
DEPRECATED RDW RBC AUTO: 45.7 FL (ref 37–54)
EGFRCR SERPLBLD CKD-EPI 2021: 86 ML/MIN/1.73
EOSINOPHIL # BLD AUTO: 0.01 10*3/MM3 (ref 0–0.4)
EOSINOPHIL NFR BLD AUTO: 0 % (ref 0.3–6.2)
ERYTHROCYTE [DISTWIDTH] IN BLOOD BY AUTOMATED COUNT: 13.3 % (ref 12.3–15.4)
FLUAV SUBTYP SPEC NAA+PROBE: NOT DETECTED
FLUBV RNA ISLT QL NAA+PROBE: NOT DETECTED
GLOBULIN UR ELPH-MCNC: 2.5 GM/DL
GLUCOSE SERPL-MCNC: 112 MG/DL (ref 65–99)
HADV DNA SPEC NAA+PROBE: NOT DETECTED
HCOV 229E RNA SPEC QL NAA+PROBE: NOT DETECTED
HCOV HKU1 RNA SPEC QL NAA+PROBE: NOT DETECTED
HCOV NL63 RNA SPEC QL NAA+PROBE: NOT DETECTED
HCOV OC43 RNA SPEC QL NAA+PROBE: NOT DETECTED
HCT VFR BLD AUTO: 37.5 % (ref 34–46.6)
HGB BLD-MCNC: 12.1 G/DL (ref 12–15.9)
HMPV RNA NPH QL NAA+NON-PROBE: NOT DETECTED
HPIV1 RNA ISLT QL NAA+PROBE: NOT DETECTED
HPIV2 RNA SPEC QL NAA+PROBE: NOT DETECTED
HPIV3 RNA NPH QL NAA+PROBE: NOT DETECTED
HPIV4 P GENE NPH QL NAA+PROBE: NOT DETECTED
IMM GRANULOCYTES # BLD AUTO: 0.21 10*3/MM3 (ref 0–0.05)
IMM GRANULOCYTES NFR BLD AUTO: 0.9 % (ref 0–0.5)
INR PPP: 1.11 (ref 0.9–1.1)
L PNEUMO1 AG UR QL IA: NEGATIVE
LYMPHOCYTES # BLD AUTO: 1.15 10*3/MM3 (ref 0.7–3.1)
LYMPHOCYTES NFR BLD AUTO: 5 % (ref 19.6–45.3)
M PNEUMO IGG SER IA-ACNC: NOT DETECTED
MCH RBC QN AUTO: 30.2 PG (ref 26.6–33)
MCHC RBC AUTO-ENTMCNC: 32.3 G/DL (ref 31.5–35.7)
MCV RBC AUTO: 93.5 FL (ref 79–97)
MONOCYTES # BLD AUTO: 1.16 10*3/MM3 (ref 0.1–0.9)
MONOCYTES NFR BLD AUTO: 5 % (ref 5–12)
MRSA DNA SPEC QL NAA+PROBE: NORMAL
NEUTROPHILS NFR BLD AUTO: 20.41 10*3/MM3 (ref 1.7–7)
NEUTROPHILS NFR BLD AUTO: 88.9 % (ref 42.7–76)
NRBC BLD AUTO-RTO: 0 /100 WBC (ref 0–0.2)
NT-PROBNP SERPL-MCNC: 147 PG/ML (ref 0–900)
PLATELET # BLD AUTO: 232 10*3/MM3 (ref 140–450)
PMV BLD AUTO: 10.6 FL (ref 6–12)
POTASSIUM SERPL-SCNC: 4.3 MMOL/L (ref 3.5–5.2)
PROCALCITONIN SERPL-MCNC: 0.54 NG/ML (ref 0–0.25)
PROT SERPL-MCNC: 6.5 G/DL (ref 6–8.5)
PROTHROMBIN TIME: 14.4 SECONDS (ref 11.7–14.2)
QT INTERVAL: 387 MS
RBC # BLD AUTO: 4.01 10*6/MM3 (ref 3.77–5.28)
RHINOVIRUS RNA SPEC NAA+PROBE: NOT DETECTED
RSV RNA NPH QL NAA+NON-PROBE: NOT DETECTED
S PNEUM AG SPEC QL LA: NEGATIVE
SARS-COV-2 RNA NPH QL NAA+NON-PROBE: NOT DETECTED
SODIUM SERPL-SCNC: 139 MMOL/L (ref 136–145)
TROPONIN T SERPL-MCNC: <0.01 NG/ML (ref 0–0.03)
WBC NRBC COR # BLD: 22.98 10*3/MM3 (ref 3.4–10.8)

## 2022-12-15 PROCEDURE — 71275 CT ANGIOGRAPHY CHEST: CPT

## 2022-12-15 PROCEDURE — 85610 PROTHROMBIN TIME: CPT | Performed by: EMERGENCY MEDICINE

## 2022-12-15 PROCEDURE — 84145 PROCALCITONIN (PCT): CPT | Performed by: EMERGENCY MEDICINE

## 2022-12-15 PROCEDURE — 87449 NOS EACH ORGANISM AG IA: CPT | Performed by: NURSE PRACTITIONER

## 2022-12-15 PROCEDURE — 36415 COLL VENOUS BLD VENIPUNCTURE: CPT

## 2022-12-15 PROCEDURE — G0378 HOSPITAL OBSERVATION PER HR: HCPCS

## 2022-12-15 PROCEDURE — 93010 ELECTROCARDIOGRAM REPORT: CPT | Performed by: STUDENT IN AN ORGANIZED HEALTH CARE EDUCATION/TRAINING PROGRAM

## 2022-12-15 PROCEDURE — 25010000002 CEFTRIAXONE PER 250 MG: Performed by: EMERGENCY MEDICINE

## 2022-12-15 PROCEDURE — 25010000002 AZITHROMYCIN PER 500 MG: Performed by: EMERGENCY MEDICINE

## 2022-12-15 PROCEDURE — 80053 COMPREHEN METABOLIC PANEL: CPT | Performed by: EMERGENCY MEDICINE

## 2022-12-15 PROCEDURE — 87040 BLOOD CULTURE FOR BACTERIA: CPT | Performed by: EMERGENCY MEDICINE

## 2022-12-15 PROCEDURE — 93005 ELECTROCARDIOGRAM TRACING: CPT | Performed by: EMERGENCY MEDICINE

## 2022-12-15 PROCEDURE — 0202U NFCT DS 22 TRGT SARS-COV-2: CPT | Performed by: EMERGENCY MEDICINE

## 2022-12-15 PROCEDURE — 87641 MR-STAPH DNA AMP PROBE: CPT | Performed by: NURSE PRACTITIONER

## 2022-12-15 PROCEDURE — 99285 EMERGENCY DEPT VISIT HI MDM: CPT

## 2022-12-15 PROCEDURE — 96372 THER/PROPH/DIAG INJ SC/IM: CPT

## 2022-12-15 PROCEDURE — 71045 X-RAY EXAM CHEST 1 VIEW: CPT

## 2022-12-15 PROCEDURE — 93005 ELECTROCARDIOGRAM TRACING: CPT

## 2022-12-15 PROCEDURE — 85025 COMPLETE CBC W/AUTO DIFF WBC: CPT | Performed by: EMERGENCY MEDICINE

## 2022-12-15 PROCEDURE — 83605 ASSAY OF LACTIC ACID: CPT | Performed by: EMERGENCY MEDICINE

## 2022-12-15 PROCEDURE — 0 IOPAMIDOL PER 1 ML: Performed by: EMERGENCY MEDICINE

## 2022-12-15 PROCEDURE — 25010000002 ENOXAPARIN PER 10 MG: Performed by: STUDENT IN AN ORGANIZED HEALTH CARE EDUCATION/TRAINING PROGRAM

## 2022-12-15 PROCEDURE — 84484 ASSAY OF TROPONIN QUANT: CPT | Performed by: EMERGENCY MEDICINE

## 2022-12-15 PROCEDURE — 96365 THER/PROPH/DIAG IV INF INIT: CPT

## 2022-12-15 PROCEDURE — 83880 ASSAY OF NATRIURETIC PEPTIDE: CPT | Performed by: EMERGENCY MEDICINE

## 2022-12-15 RX ORDER — FLECAINIDE ACETATE 50 MG/1
50 TABLET ORAL EVERY 12 HOURS
Status: DISCONTINUED | OUTPATIENT
Start: 2022-12-15 | End: 2022-12-17 | Stop reason: HOSPADM

## 2022-12-15 RX ORDER — HYDROCHLOROTHIAZIDE 25 MG/1
25 TABLET ORAL DAILY
Status: DISCONTINUED | OUTPATIENT
Start: 2022-12-16 | End: 2022-12-17 | Stop reason: HOSPADM

## 2022-12-15 RX ORDER — BUSPIRONE HYDROCHLORIDE 15 MG/1
15 TABLET ORAL 2 TIMES DAILY
Status: DISCONTINUED | OUTPATIENT
Start: 2022-12-15 | End: 2022-12-17 | Stop reason: HOSPADM

## 2022-12-15 RX ORDER — IPRATROPIUM BROMIDE AND ALBUTEROL SULFATE 2.5; .5 MG/3ML; MG/3ML
3 SOLUTION RESPIRATORY (INHALATION) EVERY 4 HOURS PRN
Status: DISCONTINUED | OUTPATIENT
Start: 2022-12-15 | End: 2022-12-17 | Stop reason: HOSPADM

## 2022-12-15 RX ORDER — SODIUM CHLORIDE 0.9 % (FLUSH) 0.9 %
10 SYRINGE (ML) INJECTION AS NEEDED
Status: DISCONTINUED | OUTPATIENT
Start: 2022-12-15 | End: 2022-12-17 | Stop reason: HOSPADM

## 2022-12-15 RX ORDER — METOPROLOL SUCCINATE 50 MG/1
50 TABLET, EXTENDED RELEASE ORAL DAILY
Status: DISCONTINUED | OUTPATIENT
Start: 2022-12-16 | End: 2022-12-17 | Stop reason: HOSPADM

## 2022-12-15 RX ORDER — ENOXAPARIN SODIUM 100 MG/ML
1 INJECTION SUBCUTANEOUS EVERY 12 HOURS
Status: DISCONTINUED | OUTPATIENT
Start: 2022-12-15 | End: 2022-12-16

## 2022-12-15 RX ORDER — GUAIFENESIN 600 MG/1
1200 TABLET, EXTENDED RELEASE ORAL EVERY 12 HOURS SCHEDULED
Status: DISCONTINUED | OUTPATIENT
Start: 2022-12-15 | End: 2022-12-17 | Stop reason: HOSPADM

## 2022-12-15 RX ADMIN — SODIUM CHLORIDE, POTASSIUM CHLORIDE, SODIUM LACTATE AND CALCIUM CHLORIDE 1000 ML: 600; 310; 30; 20 INJECTION, SOLUTION INTRAVENOUS at 12:31

## 2022-12-15 RX ADMIN — SODIUM CHLORIDE 500 MG: 9 INJECTION, SOLUTION INTRAVENOUS at 14:10

## 2022-12-15 RX ADMIN — GUAIFENESIN 1200 MG: 600 TABLET, EXTENDED RELEASE ORAL at 20:53

## 2022-12-15 RX ADMIN — IOPAMIDOL 95 ML: 755 INJECTION, SOLUTION INTRAVENOUS at 11:56

## 2022-12-15 RX ADMIN — CEFTRIAXONE SODIUM 1 G: 1 INJECTION, POWDER, FOR SOLUTION INTRAMUSCULAR; INTRAVENOUS at 12:32

## 2022-12-15 RX ADMIN — GUAIFENESIN 1200 MG: 600 TABLET, EXTENDED RELEASE ORAL at 14:09

## 2022-12-15 RX ADMIN — ENOXAPARIN SODIUM 140 MG: 100 INJECTION SUBCUTANEOUS at 20:53

## 2022-12-15 NOTE — ED TRIAGE NOTES
"Pt woke 0200 c cough that was mucous c blood then it was just cough c blood.  She has right sided chest pressure.  She is soa.  She reports she was \"fine\" yest    Patient was placed in face mask during first look triage.  Patient was wearing a face mask throughout encounter.  I wore personal protective equipment throughout the encounter.  Hand hygiene was performed before and after patient encounter.     "

## 2022-12-15 NOTE — ED NOTES
Pt states she was woken up with an intense coughing spell. The mucus was tinted red. As the coughing continued, pt noticed bright red blood in mucus. Pt did not have a previous cough yesterday and stated she has not been around anyone with the flu or covid to the best of her knowledge.      Pt has a hx of a-fin and DVT's (13 years ago).

## 2022-12-15 NOTE — H&P
Patient Name:  Sarah Frey  YOB: 1970  MRN:  [id number]  Admit Date:  12/15/2022  Patient Care Team:  Yousuf Dhillon MD as PCP - General (Internal Medicine)      Subjective   History Present Illness     Chief Complaint   Patient presents with   • Chest Pain   • Shortness of Breath   • Coughing Up Blood       Ms. Frey is a 52 y.o. with a history of paroxysmal atrial fibrillation, DVT, on Eliquis, HTN, mitral valve insufficiency, MANJIT that presents with right-sided chest pain and hemoptysis.  She has never had a history of a pulmonary embolism.  She woke up around 2:00 this morning with right-sided chest pain and coughing.  The pain is described as a aching sharp sensation that did not radiate.  There was mucus with blood when she coughed.  She denies fever, chills, nausea, vomiting, abdominal pain, or  GI bleeding. She denies palpitations or lightheadedness.     History of Present Illness  Review of Systems   Constitutional: Positive for chills and fatigue. Negative for appetite change and unexpected weight change.   HENT: Negative for trouble swallowing.    Respiratory: Positive for cough, chest tightness and shortness of breath. Negative for choking.         Hemoptysis     Cardiovascular: Negative for chest pain.   Gastrointestinal: Negative for abdominal distention, abdominal pain, blood in stool, constipation, diarrhea, nausea and vomiting.   Genitourinary: Negative for difficulty urinating and dysuria.   Musculoskeletal: Negative for back pain.   Skin: Negative for color change.   Neurological: Negative for dizziness.   Hematological: Does not bruise/bleed easily.   Psychiatric/Behavioral: Negative.  Negative for confusion.        Personal History     Past Medical History:   Diagnosis Date   • Asthma 2/3/2020   • Deep vein thrombosis of left lower extremity (HCC) 3/19/2018   • Essential hypertension, benign 4/28/2011   • Excessive or frequent menstruation 7/24/2020   • Low back pain  2019   • Major depressive disorder, single episode, unspecified 2012   • Mitral valve insufficiency 2008   • Obesity, unspecified 2020   • Obstructive sleep apnea (adult) (pediatric) 3/19/2018   • Paroxysmal atrial fibrillation (HCC) 3/27/2017   • Polyp of corpus uteri 2012     Past Surgical History:   Procedure Laterality Date   • BARIATRIC SURGERY  10/21/2020   •  SECTION  2005   • TONSILLECTOMY  2012   • TUBAL ABDOMINAL LIGATION  10/21/2010     Family History   Problem Relation Age of Onset   • Anxiety disorder Mother    • Arthritis Mother    • Asthma Mother    • Hyperlipidemia Mother      Social History     Tobacco Use   • Smoking status: Never   • Smokeless tobacco: Never   Substance Use Topics   • Alcohol use: Not Currently     Comment: occas   • Drug use: Never     No current facility-administered medications on file prior to encounter.     Current Outpatient Medications on File Prior to Encounter   Medication Sig Dispense Refill   • acetaminophen (TYLENOL) 500 MG tablet Take 500 mg by mouth As Needed for Mild Pain.     • apixaban (ELIQUIS) 5 MG tablet tablet Take 5 mg by mouth 2 (Two) Times a Day.     • ascorbic acid (VITAMIN C) 500 MG tablet Take 500 mg by mouth As Needed.     • busPIRone (BUSPAR) 15 MG tablet Take 15 mg by mouth 2 (Two) Times a Day.     • flecainide (TAMBOCOR) 50 MG tablet Take 50 mg by mouth Every 12 (Twelve) Hours.     • hydroCHLOROthiazide (HYDRODIURIL) 25 MG tablet Take 25 mg by mouth Daily.     • metoprolol succinate XL (TOPROL-XL) 50 MG 24 hr tablet Take 1 tablet by mouth once daily (Patient taking differently: Take 50 mg by mouth Daily.) 30 tablet 5   • multivitamin (THERAGRAN) tablet tablet Take 1 tablet by mouth As Needed.       No Known Allergies    Objective    Objective     Vital Signs  Temp:  [97.1 °F (36.2 °C)] 97.1 °F (36.2 °C)  Heart Rate:  [63-95] 68  Resp:  [16] 16  BP: (129-152)/(60-83) 152/82  SpO2:  [91 %-97 %] 97 %   on   ;   Device (Oxygen Therapy): room air  Body mass index is 49.87 kg/m².    Physical Exam    Results Review:  I reviewed the patient's new clinical results.  I reviewed the patient's new imaging results and agree with the interpretation.  I reviewed the patient's other test results and agree with the interpretation  I personally viewed and interpreted the patient's EKG/Telemetry data  Discussed with ED provider.    Lab Results (last 24 hours)     Procedure Component Value Units Date/Time    CBC & Differential [[id number]]  (Abnormal) Collected: 12/15/22 0853    Specimen: Blood Updated: 12/15/22 0910    Narrative:      The following orders were created for panel order CBC & Differential.  Procedure                               Abnormality         Status                     ---------                               -----------         ------                     CBC Auto Differential[[id number]]        Abnormal            Final result                 Please view results for these tests on the individual orders.    Comprehensive Metabolic Panel [[id number]]  (Abnormal) Collected: 12/15/22 0853    Specimen: Blood Updated: 12/15/22 0953     Glucose 112 mg/dL      BUN 11 mg/dL      Creatinine 0.83 mg/dL      Sodium 139 mmol/L      Potassium 4.3 mmol/L      Comment: Slight hemolysis detected by analyzer. Results may be affected.        Chloride 103 mmol/L      CO2 24.2 mmol/L      Calcium 9.0 mg/dL      Total Protein 6.5 g/dL      Albumin 4.00 g/dL      ALT (SGPT) 13 U/L      AST (SGOT) 20 U/L      Comment: Slight hemolysis detected by analyzer. Results may be affected.        Alkaline Phosphatase 73 U/L      Total Bilirubin 0.6 mg/dL      Globulin 2.5 gm/dL      A/G Ratio 1.6 g/dL      BUN/Creatinine Ratio 13.3     Anion Gap 11.8 mmol/L      eGFR 86.0 mL/min/1.73      Comment: National Kidney Foundation and American Society of Nephrology (ASN) Task Force recommended calculation based on the Chronic Kidney Disease  Epidemiology Collaboration (CKD-EPI) equation refit without adjustment for race.       Narrative:      GFR Normal >60  Chronic Kidney Disease <60  Kidney Failure <15      Protime-INR [[id number]]  (Abnormal) Collected: 12/15/22 0853    Specimen: Blood Updated: 12/15/22 0922     Protime 14.4 Seconds      INR 1.11    Troponin [[id number]]  (Normal) Collected: 12/15/22 0853    Specimen: Blood Updated: 12/15/22 0942     Troponin T <0.010 ng/mL     Narrative:      Troponin T Reference Range:  <= 0.03 ng/mL-   Negative for AMI  >0.03 ng/mL-     Abnormal for myocardial necrosis.  Clinicians would have to utilize clinical acumen, EKG, Troponin and serial changes to determine if it is an Acute Myocardial Infarction or myocardial injury due to an underlying chronic condition.       Results may be falsely decreased if patient taking Biotin.      BNP [[id number]]  (Normal) Collected: 12/15/22 0853    Specimen: Blood Updated: 12/15/22 0936     proBNP 147.0 pg/mL     Narrative:      Among patients with dyspnea, NT-proBNP is highly sensitive for the detection of acute congestive heart failure. In addition NT-proBNP of <300 pg/ml effectively rules out acute congestive heart failure with 99% negative predictive value.    Results may be falsely decreased if patient taking Biotin.      Procalcitonin [[id number]]  (Abnormal) Collected: 12/15/22 0853    Specimen: Blood Updated: 12/15/22 0936     Procalcitonin 0.54 ng/mL     Narrative:      As a Marker for Sepsis (Non-Neonates):    1. <0.5 ng/mL represents a low risk of severe sepsis and/or septic shock.  2. >2 ng/mL represents a high risk of severe sepsis and/or septic shock.    As a Marker for Lower Respiratory Tract Infections that require antibiotic therapy:    PCT on Admission    Antibiotic Therapy       6-12 Hrs later    >0.5                Strongly Recommended  >0.25 - <0.5        Recommended   0.1 - 0.25          Discouraged              Remeasure/reassess PCT  <0.1                 "Strongly Discouraged     Remeasure/reassess PCT    As 28 day mortality risk marker: \"Change in Procalcitonin Result\" (>80% or <=80%) if Day 0 (or Day 1) and Day 4 values are available. Refer to http://www.[facility]-pct-calculator.com    Change in PCT <=80%  A decrease of PCT levels below or equal to 80% defines a positive change in PCT test result representing a higher risk for 28-day all-cause mortality of patients diagnosed with severe sepsis for septic shock.    Change in PCT >80%  A decrease of PCT levels of more than 80% defines a negative change in PCT result representing a lower risk for 28-day all-cause mortality of patients diagnosed with severe sepsis or septic shock.       CBC Auto Differential [[id number]]  (Abnormal) Collected: 12/15/22 0853    Specimen: Blood Updated: 12/15/22 0910     WBC 22.98 10*3/mm3      RBC 4.01 10*6/mm3      Hemoglobin 12.1 g/dL      Hematocrit 37.5 %      MCV 93.5 fL      MCH 30.2 pg      MCHC 32.3 g/dL      RDW 13.3 %      RDW-SD 45.7 fl      MPV 10.6 fL      Platelets 232 10*3/mm3      Neutrophil % 88.9 %      Lymphocyte % 5.0 %      Monocyte % 5.0 %      Eosinophil % 0.0 %      Basophil % 0.2 %      Immature Grans % 0.9 %      Neutrophils, Absolute 20.41 10*3/mm3      Lymphocytes, Absolute 1.15 10*3/mm3      Monocytes, Absolute 1.16 10*3/mm3      Eosinophils, Absolute 0.01 10*3/mm3      Basophils, Absolute 0.04 10*3/mm3      Immature Grans, Absolute 0.21 10*3/mm3      nRBC 0.0 /100 WBC     Respiratory Panel PCR w/COVID-19(SARS-CoV-2) ARMINDA/SYLWIA/SHANTELLE/PAD/COR/MAD/DOC In-House, NP Swab in [facility], 3-4 HR TAT - Swab, Nasopharynx [[id number]]  (Normal) Collected: 12/15/22 0854    Specimen: Swab from Nasopharynx Updated: 12/15/22 0953     ADENOVIRUS, PCR Not Detected     Coronavirus 229E Not Detected     Coronavirus HKU1 Not Detected     Coronavirus NL63 Not Detected     Coronavirus OC43 Not Detected     COVID19 Not Detected     Human Metapneumovirus Not Detected     Human " Rhinovirus/Enterovirus Not Detected     Influenza A PCR Not Detected     Influenza B PCR Not Detected     Parainfluenza Virus 1 Not Detected     Parainfluenza Virus 2 Not Detected     Parainfluenza Virus 3 Not Detected     Parainfluenza Virus 4 Not Detected     RSV, PCR Not Detected     Bordetella pertussis pcr Not Detected     Bordetella parapertussis PCR Not Detected     Chlamydophila pneumoniae PCR Not Detected     Mycoplasma pneumo by PCR Not Detected    Narrative:      In the setting of a positive respiratory panel with a viral infection PLUS a negative procalcitonin without other underlying concern for bacterial infection, consider observing off antibiotics or discontinuation of antibiotics and continue supportive care. If the respiratory panel is positive for atypical bacterial infection (Bordetella pertussis, Chlamydophila pneumoniae, or Mycoplasma pneumoniae), consider antibiotic de-escalation to target atypical bacterial infection.    Blood Culture - Blood, Arm, Left [[id number]] Collected: 12/15/22 1103    Specimen: Blood from Arm, Left Updated: 12/15/22 1259    Lactic Acid, Plasma [[id number]]  (Normal) Collected: 12/15/22 1103    Specimen: Blood Updated: 12/15/22 1149     Lactate 1.0 mmol/L     Blood Culture - Blood, Arm, Left [[id number]] Collected: 12/15/22 1225    Specimen: Blood from Arm, Left Updated: 12/15/22 1225          Imaging Results (Last 24 Hours)     Procedure Component Value Units Date/Time    CT Angiogram Chest [[id number]] Resulted: 12/15/22 1156     Updated: 12/15/22 1201    XR Chest 1 View [[id number]] Collected: 12/15/22 0937     Updated: 12/15/22 0940    Narrative:      XR CHEST 1 VW-  12/15/2022     HISTORY: Shortness of breath.     There is moderate patchy infiltrate in the right mid to lower lung  consistent with pneumonia. Right apex and left lung appears largely  clear.     Heart size is at the upper limits of normal.       Impression:      1. Moderate pneumonia of the right mid to  lower lung.     This report was finalized on 12/15/2022 9:37 AM by Dr. Benjamin Murphy M.D.                 ECG 12 Lead Chest Pain   Preliminary Result   HEART RATE= 79  bpm   RR Interval= 759  ms   NV Interval= 184  ms   P Horizontal Axis= -39  deg   P Front Axis= 42  deg   QRSD Interval= 97  ms   QT Interval= 387  ms   QRS Axis= 21  deg   T Wave Axis= 45  deg   - NORMAL ECG -   Sinus rhythm   Baseline wander in lead(s) V3,V4,V5,V6   Electronically Signed By:    Date and Time of Study: 2022-12-15 08:26:45           Assessment/Plan     Active Hospital Problems    Diagnosis  POA   • Pneumonia [J18.9]  Yes   • Sepsis due to pneumonia (HCC) [J18.9, A41.9]  Yes   • Chronic anticoagulation [Z79.01]  Not Applicable   • Hypertensive disorder [I10]  Yes   • Deep venous thrombosis (HCC) [I82.409]  Yes   • Personal history of other venous thrombosis and embolism [Z86.718]  Not Applicable   • Obstructive sleep apnea syndrome [G47.33]  Yes   • Paroxysmal atrial fibrillation (HCC) [I48.0]  Yes   • Benign essential hypertension [I10]  Yes   • Mitral valve regurgitation [I34.0]  Yes      Resolved Hospital Problems   No resolved problems to display.       Ms. Frey is a 52 y.o.  With a history of DVT on anticoagulation that presents with SOB and hemoptysis.     · Pneumonia with sepsis  CTA with infiltrate involving entire right lung likely infectious with mildly enlarged mediastinal lymph nodes.  No pulmonary embolism.  Gastric band and imaging.  WBC 22 with elevated procalcitonin.  Incidental finding of  RVP negative.  Check urine antigens, MRSA a screen and sputum culture.  Continue IV Rocephin.  Continue IV fluids.  Hemoglobin is stable so we will continue her anticoagulation but monitor hemoglobin and for further hemoptysis.  Continue symptomatic care for PNA     · MANJIT- CPAP from home or supplemental 02    · Paroxysmal atrial fibrillation- currently in SR. Continue anticoagulation but monitor for hemoptysis.  Continue  flecainide and metoprolol    · Abnormal liver finding   Calcifications in right lobe of liver unchanged since prior imaging in October 2022      · I discussed the patient's findings and my recommendations with patient, nursing staff and ED provider.    VTE Prophylaxis - Eliquis (home med).  Code Status - Full code.       MARIA LUISA Ramires  [facility]  12/15/22  13:00 EST

## 2022-12-15 NOTE — ED NOTES
..Nursing report ED to floor  Sarah Frey  52 y.o.  female    HPI :   Chief Complaint   Patient presents with    Chest Pain    Shortness of Breath    Coughing Up Blood       Admitting doctor:   Tony Williamson MD    Admitting diagnosis:   The primary encounter diagnosis was Pneumonia of right lung due to infectious organism, unspecified part of lung. Diagnoses of Chest pain, unspecified type and Hemoptysis were also pertinent to this visit.    Code status:   Current Code Status       Date Active Code Status Order ID Comments User Context       Not on file            Allergies:   Patient has no known allergies.    Isolation:   No active isolations    Intake and Output  No intake or output data in the 24 hours ending 12/15/22 1514    Weight:       12/15/22  0826   Weight: (!) 140 kg (309 lb)       Most recent vitals:   Vitals:    12/15/22 1331 12/15/22 1353 12/15/22 1401 12/15/22 1501   BP: 108/40  (!) 117/36 157/86   Pulse: 74 64 62 65   Resp:       Temp:       TempSrc:       SpO2: 91% 98% 94% 98%   Weight:       Height:           Active LDAs/IV Access:   Lines, Drains & Airways       Active LDAs       Name Placement date Placement time Site Days    Peripheral IV 12/15/22 1148 Right Antecubital 12/15/22  1148  Antecubital  less than 1                    Labs (abnormal labs have a star):   Labs Reviewed   COMPREHENSIVE METABOLIC PANEL - Abnormal; Notable for the following components:       Result Value    Glucose 112 (*)     All other components within normal limits    Narrative:     GFR Normal >60  Chronic Kidney Disease <60  Kidney Failure <15     PROTIME-INR - Abnormal; Notable for the following components:    Protime 14.4 (*)     INR 1.11 (*)     All other components within normal limits   PROCALCITONIN - Abnormal; Notable for the following components:    Procalcitonin 0.54 (*)     All other components within normal limits    Narrative:     As a Marker for Sepsis (Non-Neonates):    1. <0.5 ng/mL represents a low  "risk of severe sepsis and/or septic shock.  2. >2 ng/mL represents a high risk of severe sepsis and/or septic shock.    As a Marker for Lower Respiratory Tract Infections that require antibiotic therapy:    PCT on Admission    Antibiotic Therapy       6-12 Hrs later    >0.5                Strongly Recommended  >0.25 - <0.5        Recommended   0.1 - 0.25          Discouraged              Remeasure/reassess PCT  <0.1                Strongly Discouraged     Remeasure/reassess PCT    As 28 day mortality risk marker: \"Change in Procalcitonin Result\" (>80% or <=80%) if Day 0 (or Day 1) and Day 4 values are available. Refer to http://www.[facility]-pct-calculator.com    Change in PCT <=80%  A decrease of PCT levels below or equal to 80% defines a positive change in PCT test result representing a higher risk for 28-day all-cause mortality of patients diagnosed with severe sepsis for septic shock.    Change in PCT >80%  A decrease of PCT levels of more than 80% defines a negative change in PCT result representing a lower risk for 28-day all-cause mortality of patients diagnosed with severe sepsis or septic shock.      CBC WITH AUTO DIFFERENTIAL - Abnormal; Notable for the following components:    WBC 22.98 (*)     Neutrophil % 88.9 (*)     Lymphocyte % 5.0 (*)     Eosinophil % 0.0 (*)     Immature Grans % 0.9 (*)     Neutrophils, Absolute 20.41 (*)     Monocytes, Absolute 1.16 (*)     Immature Grans, Absolute 0.21 (*)     All other components within normal limits   RESPIRATORY PANEL PCR W/ COVID-19 (SARS-COV-2) ARMINDA/SYLWIA/SHANTELLE/PAD/COR/MAD/DOC IN-HOUSE, NP SWAB IN [facility], 3-4 HR TAT - Normal    Narrative:     In the setting of a positive respiratory panel with a viral infection PLUS a negative procalcitonin without other underlying concern for bacterial infection, consider observing off antibiotics or discontinuation of antibiotics and continue supportive care. If the respiratory panel is positive for atypical bacterial infection " (Bordetella pertussis, Chlamydophila pneumoniae, or Mycoplasma pneumoniae), consider antibiotic de-escalation to target atypical bacterial infection.   TROPONIN (IN-HOUSE) - Normal    Narrative:     Troponin T Reference Range:  <= 0.03 ng/mL-   Negative for AMI  >0.03 ng/mL-     Abnormal for myocardial necrosis.  Clinicians would have to utilize clinical acumen, EKG, Troponin and serial changes to determine if it is an Acute Myocardial Infarction or myocardial injury due to an underlying chronic condition.       Results may be falsely decreased if patient taking Biotin.     BNP (IN-HOUSE) - Normal    Narrative:     Among patients with dyspnea, NT-proBNP is highly sensitive for the detection of acute congestive heart failure. In addition NT-proBNP of <300 pg/ml effectively rules out acute congestive heart failure with 99% negative predictive value.    Results may be falsely decreased if patient taking Biotin.     LACTIC ACID, PLASMA - Normal   BLOOD CULTURE   BLOOD CULTURE   RESPIRATORY CULTURE   LEGIONELLA ANTIGEN, URINE   STREP PNEUMO AG, URINE OR CSF   MRSA SCREEN, PCR   CBC AND DIFFERENTIAL    Narrative:     The following orders were created for panel order CBC & Differential.  Procedure                               Abnormality         Status                     ---------                               -----------         ------                     CBC Auto Differential[[id number]]        Abnormal            Final result                 Please view results for these tests on the individual orders.       EKG:   ECG 12 Lead Chest Pain   Final Result   HEART RATE= 79  bpm   RR Interval= 759  ms   OH Interval= 184  ms   P Horizontal Axis= -39  deg   P Front Axis= 42  deg   QRSD Interval= 97  ms   QT Interval= 387  ms   QRS Axis= 21  deg   T Wave Axis= 45  deg   - ABNORMAL ECG -   Sinus rhythm   Nonspecific intraventricular conduction delay   When compared with ECG of 11-Oct-2022 11:36:48,   Significant rate increase    Electronically Signed By: Dirk BobbyBanner) 15-Dec-2022 14:20:06   Date and Time of Study: 2022-12-15 08:26:45          Meds given in ED:   Medications   sodium chloride 0.9 % flush 10 mL (has no administration in time range)   ipratropium-albuterol (DUO-NEB) nebulizer solution 3 mL (has no administration in time range)   busPIRone (BUSPAR) tablet 15 mg (has no administration in time range)   flecainide (TAMBOCOR) tablet 50 mg (has no administration in time range)   hydroCHLOROthiazide (HYDRODIURIL) tablet 25 mg (has no administration in time range)   metoprolol succinate XL (TOPROL-XL) 24 hr tablet 50 mg (has no administration in time range)   guaiFENesin (MUCINEX) 12 hr tablet 1,200 mg (1,200 mg Oral Given 12/15/22 1409)   Pharmacy to Dose enoxaparin (LOVENOX) (has no administration in time range)   cefTRIAXone (ROCEPHIN) 1 g in sodium chloride 0.9 % 100 mL IVPB-VTB (has no administration in time range)   Enoxaparin Sodium (LOVENOX) syringe 140 mg (has no administration in time range)   cefTRIAXone (ROCEPHIN) 1 g in sodium chloride 0.9 % 100 mL IVPB-VTB (0 g Intravenous Stopped 12/15/22 1334)   azithromycin (ZITHROMAX) 500 mg in sodium chloride 0.9 % 250 mL IVPB-VTB (500 mg Intravenous Given 12/15/22 1410)   lactated ringers bolus 1,000 mL (1,000 mL Intravenous New Bag 12/15/22 1231)   iopamidol (ISOVUE-370) 76 % injection 100 mL (95 mL Intravenous Given by Other 12/15/22 1156)       Imaging results:  XR Chest 1 View    Result Date: 12/15/2022  1. Moderate pneumonia of the right mid to lower lung.  This report was finalized on 12/15/2022 9:37 AM by Dr. Benjamin Murphy M.D.      CT Angiogram Chest    Result Date: 12/15/2022  1. Extensive nodular infiltrate within the right lung involving the right upper lobe, right middle lobe, and right lower lobe. This is favored to be infectious in etiology. There are also mildly enlarged mediastinal lymph nodes which have increased in size when compared to the previous CT  angiogram chest 10/11/2022 and are most likely reactive lymph nodes. 2. No evidence for pulmonary thromboembolic disease though evaluation of the distal segmental and subsegmental branches is somewhat limited. 3. Gastric band is present. 4. Calcifications in the posterior right lobe of the liver without clear etiology though without evidence for change compared to previous CT 10/11/2022.  This report was finalized on 12/15/2022 1:44 PM by Dr. Donaldo Kennedy M.D.       Ambulatory status:   - up at [facility]    Social issues:   Social History     Socioeconomic History    Marital status:    Tobacco Use    Smoking status: Never    Smokeless tobacco: Never   Substance and Sexual Activity    Alcohol use: Not Currently     Comment: occas    Drug use: Never    Sexual activity: Not Currently     Partners: Male     Birth control/protection: Condom, Abstinence, Tubal ligation       NIH Stroke Scale:         Poly Soto RN  12/15/22 15:14 EST

## 2022-12-15 NOTE — ED NOTES
RN tried for second set of cultures but was unsuccessful. Teach tried for second set but was also unsuccessful. RN will try again once pt is back from CT.

## 2022-12-15 NOTE — ED NOTES
Obtained consent for CT angiogram aorta. Pt voices no concerns about procedure.      Irlanda Mandujano RN  04/05/18 [id number]     [facility] phleb at bedside

## 2022-12-15 NOTE — ED PROVIDER NOTES
EMERGENCY DEPARTMENT ENCOUNTER    Room Number:  39/39  Date seen:  12/15/2022  PCP: Yousuf Dhillon MD  Historian: Patient      HPI:  Chief Complaint: Chest pain, hemoptysis  A complete HPI/ROS/PMH/PSH/SH/FH are unobtainable due to: N/A  Context: Sarah Frey is a 52 y.o. female who presents to the ED c/o new onset right-sided chest pain with hemoptysis.  Patient has a history of paroxysmal A. fib as well as remote history of DVT and on lifelong anticoagulation with Eliquis.  She has never had any PEs in the past.  Patient says that she felt well yesterday.  She went to bed as normal.  She woke up around 2:00 in the morning because of new onset pain in the right sided chest and then some coughing.  She said she was coughing up some mucus with white blood at first.  And then as the morning progressed, it seemed that she was coughing up more juventino blood that was bright red in color.  The pain in her right chest does not radiate to the arm or to the back.  It does not radiate to the left side.  She denies any abdominal pain or nausea or vomiting or diarrhea.  She denies any fevers or other constitutional symptoms.            PAST MEDICAL HISTORY  Active Ambulatory Problems     Diagnosis Date Noted   • Asthma 02/03/2020   • Low back pain 05/06/2019   • Deep vein thrombosis of left lower extremity (HCC) 03/19/2018   • Obstructive sleep apnea syndrome 03/19/2018   • Paroxysmal atrial fibrillation (HCC) 03/27/2017   • Major depression, single episode 11/05/2012   • Excessive or frequent menstruation 07/24/2020   • Polyp of corpus uteri 09/25/2012   • Benign essential hypertension 04/28/2011   • Mitral valve regurgitation 01/01/2008   • Obesity 04/18/2016   • Encounter for gynecological examination 11/08/2019   • History of deep vein thrombosis (DVT) of lower extremity 07/24/2020   • Allergic rhinitis 03/16/2020   • Arthritis 03/16/2020   • Personal history of other venous thrombosis and embolism 07/24/2020   •  Polyphagia 2020   • Viral hepatitis C 2016   • Acute respiratory failure with hypoxia (HCC) 2021   • Cardiomegaly 2021   • Pneumonia due to COVID-19 virus 2021   • Hypertensive disorder 2022   • Chronic anticoagulation 2022   • Deep venous thrombosis (HCC) 2020     Resolved Ambulatory Problems     Diagnosis Date Noted   • No Resolved Ambulatory Problems     Past Medical History:   Diagnosis Date   • Essential hypertension, benign 2011   • Major depressive disorder, single episode, unspecified 2012   • Mitral valve insufficiency 2008   • Obesity, unspecified 2020   • Obstructive sleep apnea (adult) (pediatric) 3/19/2018         PAST SURGICAL HISTORY  Past Surgical History:   Procedure Laterality Date   • BARIATRIC SURGERY  10/21/2020   •  SECTION  2005   • TONSILLECTOMY  2012   • TUBAL ABDOMINAL LIGATION  10/21/2010         FAMILY HISTORY  Family History   Problem Relation Age of Onset   • Anxiety disorder Mother    • Arthritis Mother    • Asthma Mother    • Hyperlipidemia Mother          SOCIAL HISTORY  Social History     Socioeconomic History   • Marital status:    Tobacco Use   • Smoking status: Never   • Smokeless tobacco: Never   Substance and Sexual Activity   • Alcohol use: Not Currently     Comment: occas   • Drug use: Never   • Sexual activity: Not Currently     Partners: Male     Birth control/protection: Condom, Abstinence, Tubal ligation         ALLERGIES  Patient has no known allergies.        REVIEW OF SYSTEMS  Review of Systems   Constitutional: Negative for activity change, diaphoresis and fever.   HENT: Negative.    Eyes: Negative for pain and visual disturbance.   Respiratory: Positive for cough and shortness of breath. Negative for stridor.    Cardiovascular: Positive for chest pain.   Gastrointestinal: Negative for abdominal pain, diarrhea and vomiting.   Genitourinary: Negative for dysuria.    Skin: Negative for color change and rash.   Neurological: Negative for syncope and headaches.   All other systems reviewed and are negative.           PHYSICAL EXAM  ED Triage Vitals   Temp Heart Rate Resp BP SpO2   12/15/22 0821 12/15/22 0821 12/15/22 0821 12/15/22 0826 12/15/22 0821   97.1 °F (36.2 °C) 95 16 148/83 91 %      Temp src Heart Rate Source Patient Position BP Location FiO2 (%)   12/15/22 0821 12/15/22 0821 -- -- --   Tympanic Monitor          GENERAL: Pleasant lady, resting calmly in the bed, no acute distress  HENT: nares patent, normocephalic and atraumatic.  Posterior oropharynx is normal.  Moist mucous membranes.  EYES: no scleral icterus, EOMI, normal conjunctiva  CV: regular rhythm, normal rate, no murmurs, normal distal pulses at the radials  RESPIRATORY: normal effort, no stridor, lungs seem clear to auscultation bilaterally, no wheezes or rales or rhonchi.  Couple of nonproductive cough noted during exam.  ABDOMEN: soft, nontender throughout, no masses  MUSCULOSKELETAL: no deformity, no asymmetry.  Trace edema to the bilateral lower extremities and ankles  NEURO: alert, moves all extremities, follows commands, no deficits  PSYCH:  calm, cooperative  SKIN: warm, dry    Vital signs and nursing notes reviewed.          LAB RESULTS  Recent Results (from the past 24 hour(s))   ECG 12 Lead Chest Pain    Collection Time: 12/15/22  8:26 AM   Result Value Ref Range    QT Interval 387 ms   Comprehensive Metabolic Panel    Collection Time: 12/15/22  8:53 AM    Specimen: Blood   Result Value Ref Range    Glucose 112 (H) 65 - 99 mg/dL    BUN 11 6 - 20 mg/dL    Creatinine 0.83 0.57 - 1.00 mg/dL    Sodium 139 136 - 145 mmol/L    Potassium 4.3 3.5 - 5.2 mmol/L    Chloride 103 98 - 107 mmol/L    CO2 24.2 22.0 - 29.0 mmol/L    Calcium 9.0 8.6 - 10.5 mg/dL    Total Protein 6.5 6.0 - 8.5 g/dL    Albumin 4.00 3.50 - 5.20 g/dL    ALT (SGPT) 13 1 - 33 U/L    AST (SGOT) 20 1 - 32 U/L    Alkaline Phosphatase 73 39 -  117 U/L    Total Bilirubin 0.6 0.0 - 1.2 mg/dL    Globulin 2.5 gm/dL    A/G Ratio 1.6 g/dL    BUN/Creatinine Ratio 13.3 7.0 - 25.0    Anion Gap 11.8 5.0 - 15.0 mmol/L    eGFR 86.0 >60.0 mL/min/1.73   Protime-INR    Collection Time: 12/15/22  8:53 AM    Specimen: Blood   Result Value Ref Range    Protime 14.4 (H) 11.7 - 14.2 Seconds    INR 1.11 (H) 0.90 - 1.10   Troponin    Collection Time: 12/15/22  8:53 AM    Specimen: Blood   Result Value Ref Range    Troponin T <0.010 0.000 - 0.030 ng/mL   BNP    Collection Time: 12/15/22  8:53 AM    Specimen: Blood   Result Value Ref Range    proBNP 147.0 0.0 - 900.0 pg/mL   Procalcitonin    Collection Time: 12/15/22  8:53 AM    Specimen: Blood   Result Value Ref Range    Procalcitonin 0.54 (H) 0.00 - 0.25 ng/mL   CBC Auto Differential    Collection Time: 12/15/22  8:53 AM    Specimen: Blood   Result Value Ref Range    WBC 22.98 (H) 3.40 - 10.80 10*3/mm3    RBC 4.01 3.77 - 5.28 10*6/mm3    Hemoglobin 12.1 12.0 - 15.9 g/dL    Hematocrit 37.5 34.0 - 46.6 %    MCV 93.5 79.0 - 97.0 fL    MCH 30.2 26.6 - 33.0 pg    MCHC 32.3 31.5 - 35.7 g/dL    RDW 13.3 12.3 - 15.4 %    RDW-SD 45.7 37.0 - 54.0 fl    MPV 10.6 6.0 - 12.0 fL    Platelets 232 140 - 450 10*3/mm3    Neutrophil % 88.9 (H) 42.7 - 76.0 %    Lymphocyte % 5.0 (L) 19.6 - 45.3 %    Monocyte % 5.0 5.0 - 12.0 %    Eosinophil % 0.0 (L) 0.3 - 6.2 %    Basophil % 0.2 0.0 - 1.5 %    Immature Grans % 0.9 (H) 0.0 - 0.5 %    Neutrophils, Absolute 20.41 (H) 1.70 - 7.00 10*3/mm3    Lymphocytes, Absolute 1.15 0.70 - 3.10 10*3/mm3    Monocytes, Absolute 1.16 (H) 0.10 - 0.90 10*3/mm3    Eosinophils, Absolute 0.01 0.00 - 0.40 10*3/mm3    Basophils, Absolute 0.04 0.00 - 0.20 10*3/mm3    Immature Grans, Absolute 0.21 (H) 0.00 - 0.05 10*3/mm3    nRBC 0.0 0.0 - 0.2 /100 WBC   Respiratory Panel PCR w/COVID-19(SARS-CoV-2) [facility]LE/[facility] In-House, NP Swab in [facility], 3-4 HR TAT - Swab, Nasopharynx    Collection Time: 12/15/22  8:54 AM     Specimen: Nasopharynx; Swab   Result Value Ref Range    ADENOVIRUS, PCR Not Detected Not Detected    Coronavirus 229E Not Detected Not Detected    Coronavirus HKU1 Not Detected Not Detected    Coronavirus NL63 Not Detected Not Detected    Coronavirus OC43 Not Detected Not Detected    COVID19 Not Detected Not Detected - Ref. Range    Human Metapneumovirus Not Detected Not Detected    Human Rhinovirus/Enterovirus Not Detected Not Detected    Influenza A PCR Not Detected Not Detected    Influenza B PCR Not Detected Not Detected    Parainfluenza Virus 1 Not Detected Not Detected    Parainfluenza Virus 2 Not Detected Not Detected    Parainfluenza Virus 3 Not Detected Not Detected    Parainfluenza Virus 4 Not Detected Not Detected    RSV, PCR Not Detected Not Detected    Bordetella pertussis pcr Not Detected Not Detected    Bordetella parapertussis PCR Not Detected Not Detected    Chlamydophila pneumoniae PCR Not Detected Not Detected    Mycoplasma pneumo by PCR Not Detected Not Detected   Lactic Acid, Plasma    Collection Time: 12/15/22 11:03 AM    Specimen: Blood   Result Value Ref Range    Lactate 1.0 0.5 - 2.0 mmol/L       Ordered the above labs and reviewed the results.        RADIOLOGY  XR Chest 1 View    Result Date: 12/15/2022  XR CHEST 1 VW-  12/15/2022  HISTORY: Shortness of breath.  There is moderate patchy infiltrate in the right mid to lower lung consistent with pneumonia. Right apex and left lung appears largely clear.  Heart size is at the upper limits of normal.      1. Moderate pneumonia of the right mid to lower lung.  This report was finalized on 12/15/2022 9:37 AM by Dr. Benjamin Murphy M.D.      CT Angiogram Chest    Result Date: 12/15/2022  CT ANGIOGRAM OF THE CHEST. MULTIPLE CORONAL, SAGITTAL, AND 3D RECONSTRUCTIONS  HISTORY: Dyspnea.  TECHNIQUE: Radiation dose reduction techniques were utilized, including automated exposure control and exposure modulation based on body size. CT angiogram of the  chest was performed following the administration of IV contrast. Coronal, sagittal, and 3D reconstruction images were obtained.  COMPARISON:  CT angiogram chest 10/11/2022.  FINDINGS: Evaluation of the distal segmental and subsegmental pulmonary arterial branches is limited by motion related artifact as well as streak artifact related to the patient's body type. There is no evidence for large or central pulmonary embolus. There is mild mediastinal ana enlargement. Right paratracheal node in the superior mediastinum measures 1.2 cm in AP dimension and on the prior exam measured 0.8 cm. A prevascular space node measures 0.8 cm and previously measured 0.6 cm. Subcarinal node measures 1.1 cm AP dimension and previously measured 0.9 cm. There is no axillary ana enlargement.  There is extensive hazy, fluffy ill-defined nodular airspace disease within the right upper lobe, right middle lobe, and right lower lobe. This is consistent with multilobar pneumonia in the proper clinical setting. There is no evidence for infiltrate on the left. There is no pleural effusion.  Imaging through the upper abdomen demonstrates a gastric band. There are calcifications in the posterior right lobe of the liver which were also present on the previous CT 10/11/2022 without evidence for change.      1. Extensive nodular infiltrate within the right lung involving the right upper lobe, right middle lobe and right lower lobes. This is favored to be infectious in etiology. There are also mildly enlarged mediastinal lymph nodes which have increased in size when compared to the previous CT angiogram chest 10/11/2022 and are most likely reactive lymph nodes. 2. No evidence for pulmonary thromboembolic disease though evaluation of the distal segmental and subsegmental branches is somewhat limited. 3. Gastric band is present. 4. Calcifications in the posterior right lobe of the liver without clear etiology though without evidence for change  compared to previous CT 10/11/2022.          Ordered the above noted radiological studies. Reviewed by me in PACS.            PROCEDURES  Procedures    EKG           EKG time/Interp time: 0826/0831  Rhythm/Rate: Sinus rhythm, 79 bpm  P waves and UT: Present, 184 ms  QRS, axis: 97 ms, normal axis  ST and T waves: No ST segment elevations evident    Independently interpreted by me contemporaneously with treatment          MEDICATIONS GIVEN IN ER  Medications   sodium chloride 0.9 % flush 10 mL (has no administration in time range)   azithromycin (ZITHROMAX) 500 mg in sodium chloride 0.9 % 250 mL IVPB-VTB (has no administration in time range)   ipratropium-albuterol (DUO-NEB) nebulizer solution 3 mL (has no administration in time range)   cefTRIAXone (ROCEPHIN) 1 g in sodium chloride 0.9 % 100 mL IVPB-VTB (0 g Intravenous Stopped 12/15/22 1334)   lactated ringers bolus 1,000 mL (1,000 mL Intravenous New Bag 12/15/22 1231)   iopamidol (ISOVUE-370) 76 % injection 100 mL (95 mL Intravenous Given by Other 12/15/22 1156)                   MEDICAL DECISION MAKING, PROGRESS, and CONSULTS    All labs have been independently reviewed by me.  All radiology studies have been reviewed by me and discussed with radiologist dictating the report.   EKG's independently viewed and interpreted by me.  Discussion below represents my analysis of pertinent findings related to patient's condition, differential diagnosis, treatment plan and final disposition.      My differential diagnosis for chest pain includes but is not limited to:  Muscle strain, costochondritis, myositis, pleurisy, rib fracture, intercostal neuritis, herpes zoster, tumor, myocardial infarction, coronary syndrome, unstable angina, angina, aortic dissection, mitral valve prolapse, pericarditis, palpitations, pulmonary embolus, pneumonia, pneumothorax, lung cancer, GERD, esophagitis, esophageal spasm    My differential diagnosis for cough includes but is not limited  to:  Upper respiratory infection, bronchitis, pneumonia, COPD exacerbation, cough variant asthma, cardiac asthma, coronary artery disease, congestive heart failure, bacterial, viral or lung infections, lung cancer, aspiration pneumonitis, aspiration of foreign body and Covid -19          ED Course as of 12/15/22 1342   Thu Dec 15, 2022   0850 Patient presents with atypical right-sided chest pain and a cough.  I have low suspicion for acute coronary syndrome.  However somewhat worrisome for PE given her past history of DVT.  She is anticoagulated on Eliquis, however and that should be protective.  I interpreted the EKG and do not see any acute ischemic changes.  We will check basic labs as well as CT angiogram of the chest.  Patient does not seem to have any hemoptysis at this time.  We will continue to monitor carefully on telemetry [JUSTIN]   1030 WBC(!): 22.98 [JUSTIN]   1030 Neutrophil Rel %(!): 88.9 [JUSTIN]   1030 Procalcitonin(!): 0.54 [JUSTIN]   1030 Respiratory viral panel is reviewed and no findings of viral infection detected.  Chest x-ray interpreted by me and showing right-sided pneumonia.  I see that her white blood cell count is elevated rather significantly.  Therefore most likely explanation for her chest pain and hemoptysis are pneumonia, rather than cardiac or pulmonary embolus related.  We will start some IV antibiotics and draw blood cultures and lactic acid at this time.  Given hemoptysis features and significantly elevated white blood cell count, will recommend observation.  In the hospital today for continued care. [JUSTIN]   1256 Spoke with A.  They agree to accept her on telemetry for further management today.  Agree with antibiotic selection [JUSTIN]      ED Course User Index  [JUSTIN] Ariel Le MD              Patient was placed in face mask during triage.  Patient was wearing face mask throughout encounter.  I wore personal protective equipment throughout the encounter.  Hand hygiene was performed before  and after patient encounter.     DIAGNOSIS  Final diagnoses:   Pneumonia of right lung due to infectious organism, unspecified part of lung   Chest pain, unspecified type   Hemoptysis         DISPOSITION  Admit A, [facility]              Latest Documented Vital Signs:  As of 13:42 EST  BP- 152/82 HR- 68 Temp- 97.1 °F (36.2 °C) (Tympanic) O2 sat- 97%        --    Please note that portions of this were completed with a voice recognition program.          Ariel Le MD  12/15/22 [id number]

## 2022-12-15 NOTE — PROGRESS NOTES
Clinical Pharmacy Services: Medication History    Sarah Frey is a 52 y.o. female presenting to [facility] for   Chief Complaint   Patient presents with   • Chest Pain   • Shortness of Breath   • Coughing Up Blood       She  has a past medical history of Asthma (2/3/2020), Deep vein thrombosis of left lower extremity (HCC) (3/19/2018), Essential hypertension, benign (4/28/2011), Excessive or frequent menstruation (7/24/2020), Low back pain (5/6/2019), Major depressive disorder, single episode, unspecified (11/5/2012), Mitral valve insufficiency (1/1/2008), Obesity, unspecified (7/24/2020), Obstructive sleep apnea (adult) (pediatric) (3/19/2018), Paroxysmal atrial fibrillation (HCC) (3/27/2017), and Polyp of corpus uteri (9/25/2012).    Allergies as of 12/15/2022   • (No Known Allergies)       Medication information was obtained from: Patient  Pharmacy and Phone Number:     Prior to Admission Medications     Prescriptions Last Dose Informant Patient Reported? Taking?    acetaminophen (TYLENOL) 500 MG tablet  Self Yes Yes    Take 500 mg by mouth As Needed for Mild Pain.    apixaban (ELIQUIS) 5 MG tablet tablet 12/15/2022 Self Yes Yes    Take 5 mg by mouth 2 (Two) Times a Day.    ascorbic acid (VITAMIN C) 500 MG tablet  Self Yes Yes    Take 500 mg by mouth As Needed.    busPIRone (BUSPAR) 15 MG tablet 12/15/2022 Self Yes Yes    Take 15 mg by mouth 2 (Two) Times a Day.    flecainide (TAMBOCOR) 50 MG tablet 12/15/2022 Self Yes Yes    Take 50 mg by mouth Every 12 (Twelve) Hours.    hydroCHLOROthiazide (HYDRODIURIL) 25 MG tablet 12/15/2022 Self Yes Yes    Take 25 mg by mouth Daily.    metoprolol succinate XL (TOPROL-XL) 50 MG 24 hr tablet 12/15/2022 Self No Yes    Take 1 tablet by mouth once daily    Patient taking differently:  Take 50 mg by mouth Daily.    multivitamin (THERAGRAN) tablet tablet  Self Yes Yes    Take 1 tablet by mouth As Needed.            Medication notes:     This medication list is  complete to the best of my knowledge as of 12/15/2022    Please call if questions.    Toni Grant  Medication History Technician   [phone]    12/15/2022 12:58 EST

## 2022-12-16 LAB
ANION GAP SERPL CALCULATED.3IONS-SCNC: 8 MMOL/L (ref 5–15)
BUN SERPL-MCNC: 9 MG/DL (ref 6–20)
BUN/CREAT SERPL: 11.3 (ref 7–25)
CALCIUM SPEC-SCNC: 8.7 MG/DL (ref 8.6–10.5)
CHLORIDE SERPL-SCNC: 103 MMOL/L (ref 98–107)
CO2 SERPL-SCNC: 27 MMOL/L (ref 22–29)
CREAT SERPL-MCNC: 0.8 MG/DL (ref 0.57–1)
DEPRECATED RDW RBC AUTO: 43.9 FL (ref 37–54)
EGFRCR SERPLBLD CKD-EPI 2021: 89.9 ML/MIN/1.73
ERYTHROCYTE [DISTWIDTH] IN BLOOD BY AUTOMATED COUNT: 13.1 % (ref 12.3–15.4)
GLUCOSE SERPL-MCNC: 112 MG/DL (ref 65–99)
HCT VFR BLD AUTO: 37.5 % (ref 34–46.6)
HGB BLD-MCNC: 12.3 G/DL (ref 12–15.9)
MCH RBC QN AUTO: 30 PG (ref 26.6–33)
MCHC RBC AUTO-ENTMCNC: 32.8 G/DL (ref 31.5–35.7)
MCV RBC AUTO: 91.5 FL (ref 79–97)
PLATELET # BLD AUTO: 223 10*3/MM3 (ref 140–450)
PMV BLD AUTO: 11.2 FL (ref 6–12)
POTASSIUM SERPL-SCNC: 3.5 MMOL/L (ref 3.5–5.2)
RBC # BLD AUTO: 4.1 10*6/MM3 (ref 3.77–5.28)
SODIUM SERPL-SCNC: 138 MMOL/L (ref 136–145)
WBC NRBC COR # BLD: 14.06 10*3/MM3 (ref 3.4–10.8)

## 2022-12-16 PROCEDURE — G0378 HOSPITAL OBSERVATION PER HR: HCPCS

## 2022-12-16 PROCEDURE — 85027 COMPLETE CBC AUTOMATED: CPT | Performed by: STUDENT IN AN ORGANIZED HEALTH CARE EDUCATION/TRAINING PROGRAM

## 2022-12-16 PROCEDURE — 80048 BASIC METABOLIC PNL TOTAL CA: CPT | Performed by: STUDENT IN AN ORGANIZED HEALTH CARE EDUCATION/TRAINING PROGRAM

## 2022-12-16 PROCEDURE — 96372 THER/PROPH/DIAG INJ SC/IM: CPT

## 2022-12-16 PROCEDURE — 25010000002 CEFTRIAXONE PER 250 MG: Performed by: STUDENT IN AN ORGANIZED HEALTH CARE EDUCATION/TRAINING PROGRAM

## 2022-12-16 PROCEDURE — 25010000002 ENOXAPARIN PER 10 MG: Performed by: STUDENT IN AN ORGANIZED HEALTH CARE EDUCATION/TRAINING PROGRAM

## 2022-12-16 RX ORDER — POLYETHYLENE GLYCOL 3350 17 G/17G
17 POWDER, FOR SOLUTION ORAL DAILY
Status: DISCONTINUED | OUTPATIENT
Start: 2022-12-16 | End: 2022-12-17 | Stop reason: HOSPADM

## 2022-12-16 RX ADMIN — HYDROCHLOROTHIAZIDE 25 MG: 25 TABLET ORAL at 09:33

## 2022-12-16 RX ADMIN — FLECAINIDE ACETATE TABLET 50 MG: 50 TABLET ORAL at 20:50

## 2022-12-16 RX ADMIN — FLECAINIDE ACETATE TABLET 50 MG: 50 TABLET ORAL at 09:33

## 2022-12-16 RX ADMIN — GUAIFENESIN 1200 MG: 600 TABLET, EXTENDED RELEASE ORAL at 20:50

## 2022-12-16 RX ADMIN — METOPROLOL SUCCINATE 50 MG: 50 TABLET, FILM COATED, EXTENDED RELEASE ORAL at 09:33

## 2022-12-16 RX ADMIN — POLYETHYLENE GLYCOL 3350 17 G: 17 POWDER, FOR SOLUTION ORAL at 20:50

## 2022-12-16 RX ADMIN — BUSPIRONE HYDROCHLORIDE 15 MG: 15 TABLET ORAL at 09:33

## 2022-12-16 RX ADMIN — CEFTRIAXONE SODIUM 1 G: 1 INJECTION, POWDER, FOR SOLUTION INTRAMUSCULAR; INTRAVENOUS at 11:35

## 2022-12-16 RX ADMIN — ENOXAPARIN SODIUM 140 MG: 100 INJECTION SUBCUTANEOUS at 09:32

## 2022-12-16 RX ADMIN — GUAIFENESIN 1200 MG: 600 TABLET, EXTENDED RELEASE ORAL at 09:33

## 2022-12-16 RX ADMIN — BUSPIRONE HYDROCHLORIDE 15 MG: 15 TABLET ORAL at 20:50

## 2022-12-16 NOTE — PROGRESS NOTES
Name: Sarah Frey ADMIT: 12/15/2022   : 1970  PCP: Yousuf Dhillon MD    MRN: [id number] LOS: 0 days   AGE/SEX: 52 y.o. female  ROOM: [facility]     Subjective   Subjective     No events overnight. She feels well, but she shows me a container with about an ounce of bright red blood in it and she says that she expectorated this much in 2 coughs. hgb is stable. Leukocytosis is improved       Objective   Objective   Vital Signs  Temp:  [97.4 °F (36.3 °C)-98.5 °F (36.9 °C)] 98.3 °F (36.8 °C)  Heart Rate:  [60-68] 68  Resp:  [16-18] 16  BP: (107-157)/(52-86) 110/77  SpO2:  [94 %-98 %] 94 %  on  Flow (L/min):  [2] 2;   Device (Oxygen Therapy): room air  Body mass index is 49.72 kg/m².  Physical Exam  Constitutional:       General: She is not in acute distress.     Appearance: She is not toxic-appearing.   Cardiovascular:      Rate and Rhythm: Normal rate and regular rhythm.      Heart sounds: Normal heart sounds.   Pulmonary:      Effort: Pulmonary effort is normal. No respiratory distress.      Breath sounds: Normal breath sounds. No wheezing, rhonchi or rales.   Abdominal:      General: Bowel sounds are normal. There is no distension.      Palpations: Abdomen is soft.      Tenderness: There is no abdominal tenderness.   Musculoskeletal:         General: No tenderness.      Right lower leg: No edema.      Left lower leg: No edema.   Neurological:      Mental Status: She is alert.   Psychiatric:         Mood and Affect: Mood normal.         Behavior: Behavior normal.         Results Review     I reviewed the patient's new clinical results.  Results from last 7 days   Lab Units 22  0905 12/15/22  0853 22  1224   WBC 10*3/mm3 14.06* 22.98* 8.87   HEMOGLOBIN g/dL 12.3 12.1 12.7   PLATELETS 10*3/mm3 223 232 252     Results from last 7 days   Lab Units 22  0905 12/15/22  0853 22  1224   SODIUM mmol/L 138 139 143   POTASSIUM mmol/L 3.5 4.3 4.4   CHLORIDE mmol/L 103 103 106   TOTAL CO2  mmol/L  --   --  27.2   CO2 mmol/L 27.0 24.2  --    BUN mg/dL 9 11 13   CREATININE mg/dL 0.80 0.83 0.80   GLUCOSE mg/dL 112* 112* 85   Estimated Creatinine Clearance: 121.7 mL/min (by C-G formula based on SCr of 0.8 mg/dL).  Results from last 7 days   Lab Units 12/15/22  0853 12/13/22  1224   ALBUMIN g/dL 4.00 4.00   BILIRUBIN mg/dL 0.6 0.4   ALK PHOS U/L 73 82   AST (SGOT) U/L 20 16   ALT (SGPT) U/L 13 9     Results from last 7 days   Lab Units 12/16/22  0905 12/15/22  0853 12/13/22  1224   CALCIUM mg/dL 8.7 9.0 9.7   ALBUMIN g/dL  --  4.00 4.00     Results from last 7 days   Lab Units 12/15/22  1103 12/15/22  0853   PROCALCITONIN ng/mL  --  0.54*   LACTATE mmol/L 1.0  --      COVID19   Date Value Ref Range Status   12/15/2022 Not Detected Not Detected - Ref. Range Final     No results found for: HGBA1C, POCGLU    CT Angiogram Chest  Narrative: CT ANGIOGRAM OF THE CHEST. MULTIPLE CORONAL, SAGITTAL, AND 3D  RECONSTRUCTIONS     HISTORY: Dyspnea.     TECHNIQUE: Radiation dose reduction techniques were utilized, including  automated exposure control and exposure modulation based on body size.   CT angiogram of the chest was performed following the administration of  IV contrast. Coronal, sagittal, and 3D reconstruction images were  obtained.     COMPARISON:  CT angiogram chest 10/11/2022.     FINDINGS: Evaluation of the distal segmental and subsegmental pulmonary  arterial branches is limited by motion related artifact as well as  streak artifact related to the patient's body type. There is no evidence  for large or central pulmonary embolus. There is mild mediastinal ana  enlargement. Right paratracheal node in the superior mediastinum  measures 1.2 cm in AP dimension and on the prior exam measured 0.8 cm. A  prevascular space node measures 0.8 cm and previously measured 0.6 cm.  Subcarinal node measures 1.1 cm AP dimension and previously measured 0.9  cm. There is no axillary ana enlargement.     There is  extensive hazy, fluffy ill-defined nodular airspace disease  within the right upper lobe, right middle lobe, and right lower lobe.  This is consistent with multilobar pneumonia in the proper clinical  setting. There is no evidence for infiltrate on the left. There is no  pleural effusion.     Imaging through the upper abdomen demonstrates a gastric band. There are  calcifications in the posterior right lobe of the liver which were also  present on the previous CT 10/11/2022 without evidence for change.     Impression: 1. Extensive nodular infiltrate within the right lung involving the  right upper lobe, right middle lobe, and right lower lobe. This is  favored to be infectious in etiology. There are also mildly enlarged  mediastinal lymph nodes which have increased in size when compared to  the previous CT angiogram chest 10/11/2022 and are most likely reactive  lymph nodes.  2. No evidence for pulmonary thromboembolic disease though evaluation of  the distal segmental and subsegmental branches is somewhat limited.  3. Gastric band is present.   4. Calcifications in the posterior right lobe of the liver without clear  etiology though without evidence for change compared to previous CT  10/11/2022.     This report was finalized on 12/15/2022 1:44 PM by Dr. Donaldo Kennedy M.D.     XR Chest 1 View  Narrative: XR CHEST 1 VW-  12/15/2022     HISTORY: Shortness of breath.     There is moderate patchy infiltrate in the right mid to lower lung  consistent with pneumonia. Right apex and left lung appears largely  clear.     Heart size is at the upper limits of normal.     Impression: 1. Moderate pneumonia of the right mid to lower lung.     This report was finalized on 12/15/2022 9:37 AM by Dr. Benjamin Murphy M.D.       Scheduled Medications  busPIRone, 15 mg, Oral, BID  cefTRIAXone, 1 g, Intravenous, Q24H  flecainide, 50 mg, Oral, Q12H  guaiFENesin, 1,200 mg, Oral, Q12H  hydroCHLOROthiazide, 25 mg, Oral,  Daily  metoprolol succinate XL, 50 mg, Oral, Daily    Infusions   Diet  Diet: Regular/House Diet; Texture: Regular Texture (IDDSI 7); Fluid Consistency: Thin (IDDSI 0)       Assessment/Plan     Active Hospital Problems    Diagnosis  POA   • **Pneumonia [J18.9]  Yes   • Sepsis due to pneumonia (HCC) [J18.9, A41.9]  Yes   • Abnormal liver diagnostic imaging [R93.2]  Yes   • Chronic anticoagulation [Z79.01]  Not Applicable   • Hypertensive disorder [I10]  Yes   • Deep venous thrombosis (HCC) [I82.409]  Yes   • Personal history of other venous thrombosis and embolism [Z86.718]  Not Applicable   • Obstructive sleep apnea syndrome [G47.33]  Yes   • Paroxysmal atrial fibrillation (HCC) [I48.0]  Yes   • Benign essential hypertension [I10]  Yes   • Mitral valve regurgitation [I34.0]  Yes      Resolved Hospital Problems   No resolved problems to display.       52 y.o. female admitted with Pneumonia.    · Pneumonia-leukocytosis is improving. Urine antigens and mrsa pcr were negative. Continue ceftriaxone.  · Hemoptysis-secondary to the above. Hold lovenox.  · Paroxysmal afib-hold lovenox as above. Continue flecainide and metoprolol  · Essential hypertension-well controlled  · History of dvt  · MANJIT  · Obesity s/p lap banding  · Anxiety/depression  · SCDs for DVT prophylaxis.  · Full code.  · Discussed with patient and nursing staff.  · Anticipate discharge home tomorrow. if hemoptysis slows/stops      Tony Williamson MD  [facility]  12/16/22  14:28 EST    I wore protective equipment throughout this patient encounter including a face mask, gloves and protective eyewear.  Hand hygiene was performed before donning protective equipment and after removal when leaving the room.

## 2022-12-16 NOTE — CASE MANAGEMENT/SOCIAL WORK
Discharge Planning Assessment  [facility]     Patient Name: Sarah Frey  MRN: [id number]  Today's Date: 12/16/2022    Admit Date: 12/15/2022    Plan: Home with family   Discharge Needs Assessment     Row Name 12/16/22 1355       Living Environment    People in Home child(tanvi), dependent    Current Living Arrangements home    Primary Care Provided by self    Quality of Family Relationships involved;helpful    Able to Return to Prior Arrangements yes       Resource/Environmental Concerns    Resource/Environmental Concerns none       Transition Planning    Patient/Family Anticipates Transition to home with family    Patient/Family Anticipated Services at Transition none    Transportation Anticipated car, drives self       Discharge Needs Assessment    Readmission Within the Last 30 Days no previous admission in last 30 days    Equipment Currently Used at Home cpap    Concerns to be Addressed no discharge needs identified    Anticipated Changes Related to Illness none    Equipment Needed After Discharge none               Discharge Plan     Row Name 12/16/22 4913       Plan    Plan Home with family    Plan Comments Spoke with pt at bedside, introduced self and explained CCP role, and confirmed pharmacy and face sheet information. Pt lives with her dependent children, she is IADL’s and drives, never used HH or SNF, she has a cpap that she needs a new nasal pillow for but her pulmonary MD retired, she asked for referral to [facility], (added to follow up providers) so she can get cpap supplies ([facility] will not refill without MD orders) Pt denies d/c needs, states her car is here and she will drive herself home.  CCP will follow. - Mare MUNOZ              Continued Care and Services - Admitted Since 12/15/2022    Coordination has not been started for this encounter.       Expected Discharge Date and Time     Expected Discharge Date Expected Discharge Time    Dec 16, 2022          Demographic Summary     Row  Name 12/16/22 1351       General Information    Admission Type observation               Functional Status     Row Name 12/16/22 5050       Functional Status    Usual Activity Tolerance excellent    Current Activity Tolerance excellent       Assessment of Health Literacy    Health Literacy Excellent       Functional Status, IADL    Medications independent    Meal Preparation independent    Housekeeping independent    Laundry independent    Shopping independent       Mental Status    General Appearance WDL WDL       Mental Status Summary    Recent Changes in Mental Status/Cognitive Functioning no changes               Psychosocial    No documentation.                Abuse/Neglect    No documentation.                Legal     Row Name 12/16/22 5780       Financial/Legal    Who Manages Finances if Patient Unable Mother               Substance Abuse    No documentation.                Patient Forms    No documentation.                   Mare Sheriff RN

## 2022-12-16 NOTE — CONSULTS
Patient requested information regarding an Advance Directive.  I provided patient information and a copy of the Advance Directive.  She wants to discuss it with her family before completing.

## 2022-12-16 NOTE — PLAN OF CARE
Goal Outcome Evaluation:  Plan of Care Reviewed With: patient        Progress: no change   VSS. SR on monitor. RA during day. Placed on 2L at night for periods of apnea. Patient wears cpap at home but does not have anyone that can bring home machine up. Denies any pain. Up ad rosa. Started on lovenox. Bed in low position. Call light in reach.

## 2022-12-16 NOTE — PROGRESS NOTES
12/13/2022    CC: Annual Exam (No other issues)  .        HPI  History of Present Illness  This pleasant 52-year-old  presents at this time for physical examination.  She lost her spouse to protracted illness several months ago.       Subjective   Sarah Frey is a 52 y.o. female.      The following portions of the patient's history were reviewed and updated as appropriate: allergies, current medications, past family history, past medical history, past social history, past surgical history and problem list.    Problem List  Patient Active Problem List   Diagnosis   • Asthma   • Low back pain   • Deep vein thrombosis of left lower extremity (HCC)   • Obstructive sleep apnea syndrome   • Paroxysmal atrial fibrillation (HCC)   • Major depression, single episode   • Excessive or frequent menstruation   • Polyp of corpus uteri   • Benign essential hypertension   • Mitral valve regurgitation   • Obesity   • Encounter for gynecological examination   • History of deep vein thrombosis (DVT) of lower extremity   • Allergic rhinitis   • Arthritis   • Personal history of other venous thrombosis and embolism   • Polyphagia   • Viral hepatitis C   • Acute respiratory failure with hypoxia (HCC)   • Cardiomegaly   • Pneumonia due to COVID-19 virus   • Hypertensive disorder   • Chronic anticoagulation   • Deep venous thrombosis (HCC)   • Pneumonia   • Sepsis due to pneumonia (HCC)   • Abnormal liver diagnostic imaging       Past Medical History  Past Medical History:   Diagnosis Date   • Asthma 2/3/2020   • Deep vein thrombosis of left lower extremity (HCC) 3/19/2018   • Essential hypertension, benign 4/28/2011   • Excessive or frequent menstruation 7/24/2020   • Low back pain 5/6/2019   • Major depressive disorder, single episode, unspecified 11/5/2012   • Mitral valve insufficiency 1/1/2008   • Obesity, unspecified 7/24/2020   • Obstructive sleep apnea (adult) (pediatric) 3/19/2018   • Paroxysmal atrial fibrillation  ([facility]) 3/27/2017   • Polyp of corpus uteri 2012       Surgical History  Past Surgical History:   Procedure Laterality Date   • BARIATRIC SURGERY  10/21/2020   •  SECTION  2005   • TONSILLECTOMY  2012   • TUBAL ABDOMINAL LIGATION  10/21/2010       Family History  Family History   Problem Relation Age of Onset   • Anxiety disorder Mother    • Arthritis Mother    • Asthma Mother    • Hyperlipidemia Mother        Social History  Social History    Tobacco Use      Smoking status: Never      Smokeless tobacco: Never       Is the Patient a current tobacco user? No    Allergies  No Known Allergies    Current Medications  No current facility-administered medications for this visit.  No current outpatient medications on file.    Facility-Administered Medications Ordered in Other Visits:   •  busPIRone (BUSPAR) tablet 15 mg, 15 mg, Oral, BID, Tony Williamson MD  •  [START ON 2022] cefTRIAXone (ROCEPHIN) 1 g in sodium chloride 0.9 % 100 mL IVPB-VTB, 1 g, Intravenous, Q24H, Tony Williamson MD  •  Enoxaparin Sodium (LOVENOX) syringe 140 mg, 1 mg/kg, Subcutaneous, Q12H, Tony Williamson MD, 140 mg at 12/15/22 2053  •  flecainide (TAMBOCOR) tablet 50 mg, 50 mg, Oral, Q12H, Tony Williamson MD  •  guaiFENesin (MUCINEX) 12 hr tablet 1,200 mg, 1,200 mg, Oral, Q12H, Bhavan Casey APRN, 1,200 mg at 12/15/22 2053  •  [START ON 2022] hydroCHLOROthiazide (HYDRODIURIL) tablet 25 mg, 25 mg, Oral, Daily, Tony Williamson MD  •  ipratropium-albuterol (DUO-NEB) nebulizer solution 3 mL, 3 mL, Nebulization, Q4H PRN, Bhavna Casey APRN  •  [START ON 2022] metoprolol succinate XL (TOPROL-XL) 24 hr tablet 50 mg, 50 mg, Oral, Daily, Tony Williamson MD  •  Pharmacy to Dose enoxaparin (LOVENOX), , Does not apply, Continuous PRN, Tony Williamson MD  •  [COMPLETED] Insert Peripheral IV, , , Once **AND** sodium chloride 0.9 % flush 10 mL, 10 mL, Intravenous, PRLIAL, Ariel Le MD     Review of  System  Review of Systems   Constitutional: Negative.    HENT: Negative.    Eyes: Negative.    Respiratory: Negative.    Cardiovascular: Negative.    Gastrointestinal: Negative.    Endocrine: Negative.    Genitourinary: Negative.    Musculoskeletal: Negative.    Skin: Negative.    Allergic/Immunologic: Negative.    Neurological: Negative.    Hematological: Negative.    Psychiatric/Behavioral: Negative.      I have reviewed and confirmed the accuracy of the ROS as documented by the MA/LPN/RN Yousuf Dhillon MD    Vitals:    12/13/22 1116   Resp: 16     Body mass index is 51.45 kg/m².    Objective     Physical Exam  Physical Exam  Vitals and nursing note reviewed.   Constitutional:       Appearance: She is well-developed.   HENT:      Head: Normocephalic and atraumatic.   Eyes:      Conjunctiva/sclera: Conjunctivae normal.   Cardiovascular:      Rate and Rhythm: Normal rate and regular rhythm.      Heart sounds: Normal heart sounds.   Pulmonary:      Effort: Pulmonary effort is normal.      Breath sounds: Normal breath sounds.   Abdominal:      General: Bowel sounds are normal.      Palpations: Abdomen is soft.   Musculoskeletal:         General: Normal range of motion.      Cervical back: Normal range of motion and neck supple.   Skin:     General: Skin is warm and dry.   Neurological:      Mental Status: She is alert and oriented to person, place, and time.   Psychiatric:         Behavior: Behavior normal.         Assessment & Plan      Pleasant 52-year-old who is a  presents at this time for physical examination.  He has been under much stress over the past several months after the loss of her spouse.  She is bringing up about 2 boys.  Her blood pressure on check by me with the fact cuff was 120/80 in the left arm sitting position.  Her BMI is greater than 51.5.    Regarding anticipatory guidance.  We discussed the importance of low-cholesterol diet.  We reviewed a low-cholesterol diet sheet that I  have given her and I answered questions regarding it.  Diagnoses and all orders for this visit:    1. Familial hypercholesterolemia (Primary)  -     Lipid Panel With / Chol / HDL Ratio    2. Primary hypertension  -     Comprehensive Metabolic Panel    3. Nocturia  -     Urinalysis With Culture If Indicated -    4. Hypothyroidism, unspecified type  -     T4, Free  -     TSH    5. Anemia, unspecified type  -     CBC & Differential      Plan:  1.)  Follow-up in 5 to 6 months.  2.)  Comprehensive metabolic profile, CBC       Yousuf Dhillon MD  12/13/2022

## 2022-12-16 NOTE — PAYOR COMM NOTE
"Portillo Nuñez (52 y.o. Female)     PLEASE SEE ATTACHED FOR INPT AUTH.    REF#[id number]      PLEASE CALL   OR  [phone] WITH INPT AUTH AND DAYS APPROVED. [facility] IS DRG WITH [facility].     THANK  YOU    LISSETTE RAMIREZ LPN CCP    Date of Birth   1970    Social Security Number       Address   [address] [place] [address]    Home Phone   [phone]    MRN   [id number]       Orthodox   None    Marital Status                               Admission Date   12/15/22    Admission Type   Emergency    Admitting Provider   Tony Williamson MD    Attending Provider   Tony Williamson MD    Department, Room/Bed   [address], [address]/1       Discharge Date       Discharge Disposition       Discharge Destination                               Attending Provider: Tony Williamson MD    Allergies: No Known Allergies    Isolation: None   Infection: None   Code Status: CPR    Ht: 167.6 cm (66\")   Wt: 140 kg (308 lb 1 oz)    Admission Cmt: None   Principal Problem: Pneumonia [J18.9]                 Active Insurance as of 12/15/2022     Primary Coverage     Payor Plan Insurance Group Employer/Plan Group    ANTHEM BLUE CROSS ANTHEM BLUE CROSS BLUE SHIELD PPO [id number]     Payor Plan Address Payor Plan Phone Number Payor Plan Fax Number Effective Dates    [address] [phone]  1/1/2019 - None Entered    [facility] [id number]       Subscriber Name Subscriber Birth Date Member ID       PORTILLO NUÑEZ 1970 [id number]                 Emergency Contacts      (Rel.) Home Phone Work Phone Mobile Phone    CLAUDIO HIGHTOWER (Mother) -- -- [phone]            [place]: NPI [id number]  Tax ID [id number]     History & Physical      Bhavna Casey APRN at 12/15/22 1255     Attestation signed by Tony Williamson MD at 12/15/22 4033      Addendum: I have reviewed the history and plan as obtained by MARIA LUISA Rubin and preformed my own " independent history and adjusted documentation as needed. I have personally examined the patient. My exam confirms their physical exam findings and I agree with the plan, with the addition of the followin year old woman with a history of dvt, essential hypertension, paroxysmal afib on eliquis, yuridia, depression/anxiety, obesity s/p lap band who presents with right sided pleuritic chest pain and hemoptysis since 2 am. She denies fevers, sweats, chills. She states that initially the blood was scant and mixed with mucous, but then became juventino. She hasn't had any bleeding since she's been in the ED. She took her last dose of eliquis this morning.    A 12 point review of systems is otherwise negative    Vitals: afebrile, hr 68, rr 16, bp 152/82, 97% room air    General: alert, no distress  HEENT: eomi, ncat  Heart: rrr, no m/g/r  Lungs: rhonchi on the left, nonlabored  Abd: obese, soft, nt/nd  Ext: no peripheral edema or cyanosis  Skin: no rashes or bruising  Neuro: cn 2-12 intact, moves all extremities spontaneously  Psyche: normal mood and affect    Labs and imaging:  Troponin undetectable  Bnp 147  Cmp normal  Lactic acid 1.0  Procalcitonin 0.54  INR 1.11  Wbc 22.98 with left shift  Rest of cbc unremarkable  RVP negative  Chest x-ray: moderate pneumonia in the right mid to lower lung  CTA chest:  IMPRESSION:  1. Extensive nodular infiltrate within the right lung involving the  right upper lobe, right middle lobe and right lower lobes. This is  favored to be infectious in etiology. There are also mildly enlarged  mediastinal lymph nodes which have increased in size when compared to  the previous CT angiogram chest 10/11/2022 and are most likely reactive  lymph nodes.  2. No evidence for pulmonary thromboembolic disease though evaluation of  the distal segmental and subsegmental branches is somewhat limited.  3. Gastric band is present.   4. Calcifications in the posterior right lobe of the liver without  clear  etiology though without evidence for change compared to previous CT  10/11/2022.    Pneumonia-started on ceftriaxone and azithromycin in the ED. Check urine antigens, mrsa nares, sputum culture. Mucolytics. Continue ceftriaxone for 5-7 days.    Paroxysmal afib-hold eliquis and initiate lovenox    Restart other home medications    I personally performed >50% of the management in this split shared patient    Tony Williamson MD  15:05 EST                       Patient Name:  Sarah Frey  YOB: 1970  MRN:  [id number]  Admit Date:  12/15/2022  Patient Care Team:  Yousuf Dhillon MD as PCP - General (Internal Medicine)      Subjective    History Present Illness     Chief Complaint   Patient presents with   • Chest Pain   • Shortness of Breath   • Coughing Up Blood       Ms. Frey is a 52 y.o. with a history of paroxysmal atrial fibrillation, DVT, on Eliquis, HTN, mitral valve insufficiency, MANJIT that presents with right-sided chest pain and hemoptysis.  She has never had a history of a pulmonary embolism.  She woke up around 2:00 this morning with right-sided chest pain and coughing.  The pain is described as a aching sharp sensation that did not radiate.  There was mucus with blood when she coughed.  She denies fever, chills, nausea, vomiting, abdominal pain, or  GI bleeding. She denies palpitations or lightheadedness.     History of Present Illness  Review of Systems   Constitutional: Positive for chills and fatigue. Negative for appetite change and unexpected weight change.   HENT: Negative for trouble swallowing.    Respiratory: Positive for cough, chest tightness and shortness of breath. Negative for choking.         Hemoptysis     Cardiovascular: Negative for chest pain.   Gastrointestinal: Negative for abdominal distention, abdominal pain, blood in stool, constipation, diarrhea, nausea and vomiting.   Genitourinary: Negative for difficulty urinating and dysuria.   Musculoskeletal: Negative  for back pain.   Skin: Negative for color change.   Neurological: Negative for dizziness.   Hematological: Does not bruise/bleed easily.   Psychiatric/Behavioral: Negative.  Negative for confusion.        Personal History     Past Medical History:   Diagnosis Date   • Asthma 2/3/2020   • Deep vein thrombosis of left lower extremity (HCC) 3/19/2018   • Essential hypertension, benign 2011   • Excessive or frequent menstruation 2020   • Low back pain 2019   • Major depressive disorder, single episode, unspecified 2012   • Mitral valve insufficiency 2008   • Obesity, unspecified 2020   • Obstructive sleep apnea (adult) (pediatric) 3/19/2018   • Paroxysmal atrial fibrillation (HCC) 3/27/2017   • Polyp of corpus uteri 2012     Past Surgical History:   Procedure Laterality Date   • BARIATRIC SURGERY  10/21/2020   •  SECTION  2005   • TONSILLECTOMY  2012   • TUBAL ABDOMINAL LIGATION  10/21/2010     Family History   Problem Relation Age of Onset   • Anxiety disorder Mother    • Arthritis Mother    • Asthma Mother    • Hyperlipidemia Mother      Social History     Tobacco Use   • Smoking status: Never   • Smokeless tobacco: Never   Substance Use Topics   • Alcohol use: Not Currently     Comment: occas   • Drug use: Never     No current facility-administered medications on file prior to encounter.     Current Outpatient Medications on File Prior to Encounter   Medication Sig Dispense Refill   • acetaminophen (TYLENOL) 500 MG tablet Take 500 mg by mouth As Needed for Mild Pain.     • apixaban (ELIQUIS) 5 MG tablet tablet Take 5 mg by mouth 2 (Two) Times a Day.     • ascorbic acid (VITAMIN C) 500 MG tablet Take 500 mg by mouth As Needed.     • busPIRone (BUSPAR) 15 MG tablet Take 15 mg by mouth 2 (Two) Times a Day.     • flecainide (TAMBOCOR) 50 MG tablet Take 50 mg by mouth Every 12 (Twelve) Hours.     • hydroCHLOROthiazide (HYDRODIURIL) 25 MG tablet Take 25 mg by  mouth Daily.     • metoprolol succinate XL (TOPROL-XL) 50 MG 24 hr tablet Take 1 tablet by mouth once daily (Patient taking differently: Take 50 mg by mouth Daily.) 30 tablet 5   • multivitamin (THERAGRAN) tablet tablet Take 1 tablet by mouth As Needed.       No Known Allergies    Objective     Objective     Vital Signs  Temp:  [97.1 °F (36.2 °C)] 97.1 °F (36.2 °C)  Heart Rate:  [63-95] 68  Resp:  [16] 16  BP: (129-152)/(60-83) 152/82  SpO2:  [91 %-97 %] 97 %  on   ;   Device (Oxygen Therapy): room air  Body mass index is 49.87 kg/m².    Physical Exam    Results Review:  I reviewed the patient's new clinical results.  I reviewed the patient's new imaging results and agree with the interpretation.  I reviewed the patient's other test results and agree with the interpretation  I personally viewed and interpreted the patient's EKG/Telemetry data  Discussed with ED provider.    Lab Results (last 24 hours)     Procedure Component Value Units Date/Time    CBC & Differential [[id number]]  (Abnormal) Collected: 12/15/22 0853    Specimen: Blood Updated: 12/15/22 0910    Narrative:      The following orders were created for panel order CBC & Differential.  Procedure                               Abnormality         Status                     ---------                               -----------         ------                     CBC Auto Differential[[id number]]        Abnormal            Final result                 Please view results for these tests on the individual orders.    Comprehensive Metabolic Panel [[id number]]  (Abnormal) Collected: 12/15/22 0853    Specimen: Blood Updated: 12/15/22 0953     Glucose 112 mg/dL      BUN 11 mg/dL      Creatinine 0.83 mg/dL      Sodium 139 mmol/L      Potassium 4.3 mmol/L      Comment: Slight hemolysis detected by analyzer. Results may be affected.        Chloride 103 mmol/L      CO2 24.2 mmol/L      Calcium 9.0 mg/dL      Total Protein 6.5 g/dL      Albumin 4.00 g/dL      ALT (SGPT) 13  U/L      AST (SGOT) 20 U/L      Comment: Slight hemolysis detected by analyzer. Results may be affected.        Alkaline Phosphatase 73 U/L      Total Bilirubin 0.6 mg/dL      Globulin 2.5 gm/dL      A/G Ratio 1.6 g/dL      BUN/Creatinine Ratio 13.3     Anion Gap 11.8 mmol/L      eGFR 86.0 mL/min/1.73      Comment: National Kidney Foundation and American Society of Nephrology (ASN) Task Force recommended calculation based on the Chronic Kidney Disease Epidemiology Collaboration (CKD-EPI) equation refit without adjustment for race.       Narrative:      GFR Normal >60  Chronic Kidney Disease <60  Kidney Failure <15      Protime-INR [[id number]]  (Abnormal) Collected: 12/15/22 0853    Specimen: Blood Updated: 12/15/22 0922     Protime 14.4 Seconds      INR 1.11    Troponin [[id number]]  (Normal) Collected: 12/15/22 0853    Specimen: Blood Updated: 12/15/22 0942     Troponin T <0.010 ng/mL     Narrative:      Troponin T Reference Range:  <= 0.03 ng/mL-   Negative for AMI  >0.03 ng/mL-     Abnormal for myocardial necrosis.  Clinicians would have to utilize clinical acumen, EKG, Troponin and serial changes to determine if it is an Acute Myocardial Infarction or myocardial injury due to an underlying chronic condition.       Results may be falsely decreased if patient taking Biotin.      BNP [[id number]]  (Normal) Collected: 12/15/22 0853    Specimen: Blood Updated: 12/15/22 0936     proBNP 147.0 pg/mL     Narrative:      Among patients with dyspnea, NT-proBNP is highly sensitive for the detection of acute congestive heart failure. In addition NT-proBNP of <300 pg/ml effectively rules out acute congestive heart failure with 99% negative predictive value.    Results may be falsely decreased if patient taking Biotin.      Procalcitonin [[id number]]  (Abnormal) Collected: 12/15/22 0853    Specimen: Blood Updated: 12/15/22 0936     Procalcitonin 0.54 ng/mL     Narrative:      As a Marker for Sepsis (Non-Neonates):    1. <0.5  "ng/mL represents a low risk of severe sepsis and/or septic shock.  2. >2 ng/mL represents a high risk of severe sepsis and/or septic shock.    As a Marker for Lower Respiratory Tract Infections that require antibiotic therapy:    PCT on Admission    Antibiotic Therapy       6-12 Hrs later    >0.5                Strongly Recommended  >0.25 - <0.5        Recommended   0.1 - 0.25          Discouraged              Remeasure/reassess PCT  <0.1                Strongly Discouraged     Remeasure/reassess PCT    As 28 day mortality risk marker: \"Change in Procalcitonin Result\" (>80% or <=80%) if Day 0 (or Day 1) and Day 4 values are available. Refer to http://www.[facility]-pct-calculator.com    Change in PCT <=80%  A decrease of PCT levels below or equal to 80% defines a positive change in PCT test result representing a higher risk for 28-day all-cause mortality of patients diagnosed with severe sepsis for septic shock.    Change in PCT >80%  A decrease of PCT levels of more than 80% defines a negative change in PCT result representing a lower risk for 28-day all-cause mortality of patients diagnosed with severe sepsis or septic shock.       CBC Auto Differential [[id number]]  (Abnormal) Collected: 12/15/22 0853    Specimen: Blood Updated: 12/15/22 0910     WBC 22.98 10*3/mm3      RBC 4.01 10*6/mm3      Hemoglobin 12.1 g/dL      Hematocrit 37.5 %      MCV 93.5 fL      MCH 30.2 pg      MCHC 32.3 g/dL      RDW 13.3 %      RDW-SD 45.7 fl      MPV 10.6 fL      Platelets 232 10*3/mm3      Neutrophil % 88.9 %      Lymphocyte % 5.0 %      Monocyte % 5.0 %      Eosinophil % 0.0 %      Basophil % 0.2 %      Immature Grans % 0.9 %      Neutrophils, Absolute 20.41 10*3/mm3      Lymphocytes, Absolute 1.15 10*3/mm3      Monocytes, Absolute 1.16 10*3/mm3      Eosinophils, Absolute 0.01 10*3/mm3      Basophils, Absolute 0.04 10*3/mm3      Immature Grans, Absolute 0.21 10*3/mm3      nRBC 0.0 /100 WBC     Respiratory Panel PCR " w/COVID-19(SARS-CoV-2) ARMINDA/SYLWIA/SHANTELLE/PAD/COR/MAD/DOC In-House, NP Swab in UTM/VTM, 3-4 HR TAT - Swab, Nasopharynx [[id number]]  (Normal) Collected: 12/15/22 0854    Specimen: Swab from Nasopharynx Updated: 12/15/22 0953     ADENOVIRUS, PCR Not Detected     Coronavirus 229E Not Detected     Coronavirus HKU1 Not Detected     Coronavirus NL63 Not Detected     Coronavirus OC43 Not Detected     COVID19 Not Detected     Human Metapneumovirus Not Detected     Human Rhinovirus/Enterovirus Not Detected     Influenza A PCR Not Detected     Influenza B PCR Not Detected     Parainfluenza Virus 1 Not Detected     Parainfluenza Virus 2 Not Detected     Parainfluenza Virus 3 Not Detected     Parainfluenza Virus 4 Not Detected     RSV, PCR Not Detected     Bordetella pertussis pcr Not Detected     Bordetella parapertussis PCR Not Detected     Chlamydophila pneumoniae PCR Not Detected     Mycoplasma pneumo by PCR Not Detected    Narrative:      In the setting of a positive respiratory panel with a viral infection PLUS a negative procalcitonin without other underlying concern for bacterial infection, consider observing off antibiotics or discontinuation of antibiotics and continue supportive care. If the respiratory panel is positive for atypical bacterial infection (Bordetella pertussis, Chlamydophila pneumoniae, or Mycoplasma pneumoniae), consider antibiotic de-escalation to target atypical bacterial infection.    Blood Culture - Blood, Arm, Left [[id number]] Collected: 12/15/22 1103    Specimen: Blood from Arm, Left Updated: 12/15/22 1259    Lactic Acid, Plasma [[id number]]  (Normal) Collected: 12/15/22 1103    Specimen: Blood Updated: 12/15/22 1149     Lactate 1.0 mmol/L     Blood Culture - Blood, Arm, Left [[id number]] Collected: 12/15/22 1225    Specimen: Blood from Arm, Left Updated: 12/15/22 1225          Imaging Results (Last 24 Hours)     Procedure Component Value Units Date/Time    CT Angiogram Chest [[id number]] Resulted:  12/15/22 1156     Updated: 12/15/22 1201    XR Chest 1 View [[id number]] Collected: 12/15/22 0937     Updated: 12/15/22 0940    Narrative:      XR CHEST 1 VW-  12/15/2022     HISTORY: Shortness of breath.     There is moderate patchy infiltrate in the right mid to lower lung  consistent with pneumonia. Right apex and left lung appears largely  clear.     Heart size is at the upper limits of normal.       Impression:      1. Moderate pneumonia of the right mid to lower lung.     This report was finalized on 12/15/2022 9:37 AM by Dr. Benjamin Murphy M.D.                 ECG 12 Lead Chest Pain   Preliminary Result   HEART RATE= 79  bpm   RR Interval= 759  ms   MD Interval= 184  ms   P Horizontal Axis= -39  deg   P Front Axis= 42  deg   QRSD Interval= 97  ms   QT Interval= 387  ms   QRS Axis= 21  deg   T Wave Axis= 45  deg   - NORMAL ECG -   Sinus rhythm   Baseline wander in lead(s) V3,V4,V5,V6   Electronically Signed By:    Date and Time of Study: 2022-12-15 08:26:45          Assessment/Plan     Active Hospital Problems    Diagnosis  POA   • Pneumonia [J18.9]  Yes   • Sepsis due to pneumonia (HCC) [J18.9, A41.9]  Yes   • Chronic anticoagulation [Z79.01]  Not Applicable   • Hypertensive disorder [I10]  Yes   • Deep venous thrombosis (HCC) [I82.409]  Yes   • Personal history of other venous thrombosis and embolism [Z86.718]  Not Applicable   • Obstructive sleep apnea syndrome [G47.33]  Yes   • Paroxysmal atrial fibrillation (HCC) [I48.0]  Yes   • Benign essential hypertension [I10]  Yes   • Mitral valve regurgitation [I34.0]  Yes      Resolved Hospital Problems   No resolved problems to display.       Ms. Frey is a 52 y.o.  With a history of DVT on anticoagulation that presents with SOB and hemoptysis.     · Pneumonia with sepsis  CTA with infiltrate involving entire right lung likely infectious with mildly enlarged mediastinal lymph nodes.  No pulmonary embolism.  Gastric band and imaging.  WBC 22 with elevated  "procalcitonin.  Incidental finding of  RVP negative.  Check urine antigens, MRSA a screen and sputum culture.  Continue IV Rocephin.  Continue IV fluids.  Hemoglobin is stable so we will continue her anticoagulation but monitor hemoglobin and for further hemoptysis.  Continue symptomatic care for PNA     · MANJIT- CPAP from home or supplemental 02    · Paroxysmal atrial fibrillation- currently in SR. Continue anticoagulation but monitor for hemoptysis.  Continue flecainide and metoprolol    · Abnormal liver finding   Calcifications in right lobe of liver unchanged since prior imaging in October 2022      · I discussed the patient's findings and my recommendations with patient, nursing staff and ED provider.    VTE Prophylaxis - Eliquis (home med).  Code Status - Full code.       MARIA LUISA Ramires  [facility]  12/15/22  13:00 EST    Electronically signed by Tony Williamson MD at 12/15/22 1506          Emergency Department Notes      Degonda, Janet, RN at 12/15/22 0825        Pt woke 0200 c cough that was mucous c blood then it was just cough c blood.  She has right sided chest pressure.  She is soa.  She reports she was \"fine\" yest    Patient was placed in face mask during first look triage.  Patient was wearing a face mask throughout encounter.  I wore personal protective equipment throughout the encounter.  Hand hygiene was performed before and after patient encounter.       Electronically signed by Degonda, Janet, RN at 12/15/22 0826     Ariel Le MD at 12/15/22 0847           EMERGENCY DEPARTMENT ENCOUNTER    Room Number:  39/39  Date seen:  12/15/2022  PCP: Yousuf Dhillon MD  Historian: Patient      HPI:  Chief Complaint: Chest pain, hemoptysis  A complete HPI/ROS/PMH/PSH/SH/FH are unobtainable due to: N/A  Context: Sarah Frey is a 52 y.o. female who presents to the ED c/o new onset right-sided chest pain with hemoptysis.  Patient has a history of paroxysmal A. fib as " well as remote history of DVT and on lifelong anticoagulation with Eliquis.  She has never had any PEs in the past.  Patient says that she felt well yesterday.  She went to bed as normal.  She woke up around 2:00 in the morning because of new onset pain in the right sided chest and then some coughing.  She said she was coughing up some mucus with white blood at first.  And then as the morning progressed, it seemed that she was coughing up more juventino blood that was bright red in color.  The pain in her right chest does not radiate to the arm or to the back.  It does not radiate to the left side.  She denies any abdominal pain or nausea or vomiting or diarrhea.  She denies any fevers or other constitutional symptoms.            PAST MEDICAL HISTORY  Active Ambulatory Problems     Diagnosis Date Noted   • Asthma 02/03/2020   • Low back pain 05/06/2019   • Deep vein thrombosis of left lower extremity (HCC) 03/19/2018   • Obstructive sleep apnea syndrome 03/19/2018   • Paroxysmal atrial fibrillation (HCC) 03/27/2017   • Major depression, single episode 11/05/2012   • Excessive or frequent menstruation 07/24/2020   • Polyp of corpus uteri 09/25/2012   • Benign essential hypertension 04/28/2011   • Mitral valve regurgitation 01/01/2008   • Obesity 04/18/2016   • Encounter for gynecological examination 11/08/2019   • History of deep vein thrombosis (DVT) of lower extremity 07/24/2020   • Allergic rhinitis 03/16/2020   • Arthritis 03/16/2020   • Personal history of other venous thrombosis and embolism 07/24/2020   • Polyphagia 12/23/2020   • Viral hepatitis C 04/27/2016   • Acute respiratory failure with hypoxia (HCC) 08/09/2021   • Cardiomegaly 08/09/2021   • Pneumonia due to COVID-19 virus 08/09/2021   • Hypertensive disorder 02/28/2022   • Chronic anticoagulation 06/30/2022   • Deep venous thrombosis (HCC) 07/24/2020     Resolved Ambulatory Problems     Diagnosis Date Noted   • No Resolved Ambulatory Problems     Past  Medical History:   Diagnosis Date   • Essential hypertension, benign 2011   • Major depressive disorder, single episode, unspecified 2012   • Mitral valve insufficiency 2008   • Obesity, unspecified 2020   • Obstructive sleep apnea (adult) (pediatric) 3/19/2018         PAST SURGICAL HISTORY  Past Surgical History:   Procedure Laterality Date   • BARIATRIC SURGERY  10/21/2020   •  SECTION  2005   • TONSILLECTOMY  2012   • TUBAL ABDOMINAL LIGATION  10/21/2010         FAMILY HISTORY  Family History   Problem Relation Age of Onset   • Anxiety disorder Mother    • Arthritis Mother    • Asthma Mother    • Hyperlipidemia Mother          SOCIAL HISTORY  Social History     Socioeconomic History   • Marital status:    Tobacco Use   • Smoking status: Never   • Smokeless tobacco: Never   Substance and Sexual Activity   • Alcohol use: Not Currently     Comment: occas   • Drug use: Never   • Sexual activity: Not Currently     Partners: Male     Birth control/protection: Condom, Abstinence, Tubal ligation         ALLERGIES  Patient has no known allergies.        REVIEW OF SYSTEMS  Review of Systems   Constitutional: Negative for activity change, diaphoresis and fever.   HENT: Negative.    Eyes: Negative for pain and visual disturbance.   Respiratory: Positive for cough and shortness of breath. Negative for stridor.    Cardiovascular: Positive for chest pain.   Gastrointestinal: Negative for abdominal pain, diarrhea and vomiting.   Genitourinary: Negative for dysuria.   Skin: Negative for color change and rash.   Neurological: Negative for syncope and headaches.   All other systems reviewed and are negative.           PHYSICAL EXAM  ED Triage Vitals   Temp Heart Rate Resp BP SpO2   12/15/22 0821 12/15/22 0821 12/15/22 0821 12/15/22 0826 12/15/22 0821   97.1 °F (36.2 °C) 95 16 148/83 91 %      Temp src Heart Rate Source Patient Position BP Location FiO2 (%)   12/15/22 0821  12/15/22 0821 -- -- --   Tympanic Monitor          GENERAL: Pleasant lady, resting calmly in the bed, no acute distress  HENT: nares patent, normocephalic and atraumatic.  Posterior oropharynx is normal.  Moist mucous membranes.  EYES: no scleral icterus, EOMI, normal conjunctiva  CV: regular rhythm, normal rate, no murmurs, normal distal pulses at the radials  RESPIRATORY: normal effort, no stridor, lungs seem clear to auscultation bilaterally, no wheezes or rales or rhonchi.  Couple of nonproductive cough noted during exam.  ABDOMEN: soft, nontender throughout, no masses  MUSCULOSKELETAL: no deformity, no asymmetry.  Trace edema to the bilateral lower extremities and ankles  NEURO: alert, moves all extremities, follows commands, no deficits  PSYCH:  calm, cooperative  SKIN: warm, dry    Vital signs and nursing notes reviewed.          LAB RESULTS  Recent Results (from the past 24 hour(s))   ECG 12 Lead Chest Pain    Collection Time: 12/15/22  8:26 AM   Result Value Ref Range    QT Interval 387 ms   Comprehensive Metabolic Panel    Collection Time: 12/15/22  8:53 AM    Specimen: Blood   Result Value Ref Range    Glucose 112 (H) 65 - 99 mg/dL    BUN 11 6 - 20 mg/dL    Creatinine 0.83 0.57 - 1.00 mg/dL    Sodium 139 136 - 145 mmol/L    Potassium 4.3 3.5 - 5.2 mmol/L    Chloride 103 98 - 107 mmol/L    CO2 24.2 22.0 - 29.0 mmol/L    Calcium 9.0 8.6 - 10.5 mg/dL    Total Protein 6.5 6.0 - 8.5 g/dL    Albumin 4.00 3.50 - 5.20 g/dL    ALT (SGPT) 13 1 - 33 U/L    AST (SGOT) 20 1 - 32 U/L    Alkaline Phosphatase 73 39 - 117 U/L    Total Bilirubin 0.6 0.0 - 1.2 mg/dL    Globulin 2.5 gm/dL    A/G Ratio 1.6 g/dL    BUN/Creatinine Ratio 13.3 7.0 - 25.0    Anion Gap 11.8 5.0 - 15.0 mmol/L    eGFR 86.0 >60.0 mL/min/1.73   Protime-INR    Collection Time: 12/15/22  8:53 AM    Specimen: Blood   Result Value Ref Range    Protime 14.4 (H) 11.7 - 14.2 Seconds    INR 1.11 (H) 0.90 - 1.10   Troponin    Collection Time: 12/15/22  8:53 AM     Specimen: Blood   Result Value Ref Range    Troponin T <0.010 0.000 - 0.030 ng/mL   BNP    Collection Time: 12/15/22  8:53 AM    Specimen: Blood   Result Value Ref Range    proBNP 147.0 0.0 - 900.0 pg/mL   Procalcitonin    Collection Time: 12/15/22  8:53 AM    Specimen: Blood   Result Value Ref Range    Procalcitonin 0.54 (H) 0.00 - 0.25 ng/mL   CBC Auto Differential    Collection Time: 12/15/22  8:53 AM    Specimen: Blood   Result Value Ref Range    WBC 22.98 (H) 3.40 - 10.80 10*3/mm3    RBC 4.01 3.77 - 5.28 10*6/mm3    Hemoglobin 12.1 12.0 - 15.9 g/dL    Hematocrit 37.5 34.0 - 46.6 %    MCV 93.5 79.0 - 97.0 fL    MCH 30.2 26.6 - 33.0 pg    MCHC 32.3 31.5 - 35.7 g/dL    RDW 13.3 12.3 - 15.4 %    RDW-SD 45.7 37.0 - 54.0 fl    MPV 10.6 6.0 - 12.0 fL    Platelets 232 140 - 450 10*3/mm3    Neutrophil % 88.9 (H) 42.7 - 76.0 %    Lymphocyte % 5.0 (L) 19.6 - 45.3 %    Monocyte % 5.0 5.0 - 12.0 %    Eosinophil % 0.0 (L) 0.3 - 6.2 %    Basophil % 0.2 0.0 - 1.5 %    Immature Grans % 0.9 (H) 0.0 - 0.5 %    Neutrophils, Absolute 20.41 (H) 1.70 - 7.00 10*3/mm3    Lymphocytes, Absolute 1.15 0.70 - 3.10 10*3/mm3    Monocytes, Absolute 1.16 (H) 0.10 - 0.90 10*3/mm3    Eosinophils, Absolute 0.01 0.00 - 0.40 10*3/mm3    Basophils, Absolute 0.04 0.00 - 0.20 10*3/mm3    Immature Grans, Absolute 0.21 (H) 0.00 - 0.05 10*3/mm3    nRBC 0.0 0.0 - 0.2 /100 WBC   Respiratory Panel PCR w/COVID-19(SARS-CoV-2) [facility]/SYLWIA/SHANTELLE/PAD/COR/MAD/DOC In-House, NP Swab in UTM/VTM, 3-4 HR TAT - Swab, Nasopharynx    Collection Time: 12/15/22  8:54 AM    Specimen: Nasopharynx; Swab   Result Value Ref Range    ADENOVIRUS, PCR Not Detected Not Detected    Coronavirus 229E Not Detected Not Detected    Coronavirus HKU1 Not Detected Not Detected    Coronavirus NL63 Not Detected Not Detected    Coronavirus OC43 Not Detected Not Detected    COVID19 Not Detected Not Detected - Ref. Range    Human Metapneumovirus Not Detected Not Detected    Human  Rhinovirus/Enterovirus Not Detected Not Detected    Influenza A PCR Not Detected Not Detected    Influenza B PCR Not Detected Not Detected    Parainfluenza Virus 1 Not Detected Not Detected    Parainfluenza Virus 2 Not Detected Not Detected    Parainfluenza Virus 3 Not Detected Not Detected    Parainfluenza Virus 4 Not Detected Not Detected    RSV, PCR Not Detected Not Detected    Bordetella pertussis pcr Not Detected Not Detected    Bordetella parapertussis PCR Not Detected Not Detected    Chlamydophila pneumoniae PCR Not Detected Not Detected    Mycoplasma pneumo by PCR Not Detected Not Detected   Lactic Acid, Plasma    Collection Time: 12/15/22 11:03 AM    Specimen: Blood   Result Value Ref Range    Lactate 1.0 0.5 - 2.0 mmol/L       Ordered the above labs and reviewed the results.        RADIOLOGY  XR Chest 1 View    Result Date: 12/15/2022  XR CHEST 1 VW-  12/15/2022  HISTORY: Shortness of breath.  There is moderate patchy infiltrate in the right mid to lower lung consistent with pneumonia. Right apex and left lung appears largely clear.  Heart size is at the upper limits of normal.      1. Moderate pneumonia of the right mid to lower lung.  This report was finalized on 12/15/2022 9:37 AM by Dr. Benjamin Murphy M.D.      CT Angiogram Chest    Result Date: 12/15/2022  CT ANGIOGRAM OF THE CHEST. MULTIPLE CORONAL, SAGITTAL, AND 3D RECONSTRUCTIONS  HISTORY: Dyspnea.  TECHNIQUE: Radiation dose reduction techniques were utilized, including automated exposure control and exposure modulation based on body size. CT angiogram of the chest was performed following the administration of IV contrast. Coronal, sagittal, and 3D reconstruction images were obtained.  COMPARISON:  CT angiogram chest 10/11/2022.  FINDINGS: Evaluation of the distal segmental and subsegmental pulmonary arterial branches is limited by motion related artifact as well as streak artifact related to the patient's body type. There is no evidence for  large or central pulmonary embolus. There is mild mediastinal ana enlargement. Right paratracheal node in the superior mediastinum measures 1.2 cm in AP dimension and on the prior exam measured 0.8 cm. A prevascular space node measures 0.8 cm and previously measured 0.6 cm. Subcarinal node measures 1.1 cm AP dimension and previously measured 0.9 cm. There is no axillary ana enlargement.  There is extensive hazy, fluffy ill-defined nodular airspace disease within the right upper lobe, right middle lobe, and right lower lobe. This is consistent with multilobar pneumonia in the proper clinical setting. There is no evidence for infiltrate on the left. There is no pleural effusion.  Imaging through the upper abdomen demonstrates a gastric band. There are calcifications in the posterior right lobe of the liver which were also present on the previous CT 10/11/2022 without evidence for change.      1. Extensive nodular infiltrate within the right lung involving the right upper lobe, right middle lobe and right lower lobes. This is favored to be infectious in etiology. There are also mildly enlarged mediastinal lymph nodes which have increased in size when compared to the previous CT angiogram chest 10/11/2022 and are most likely reactive lymph nodes. 2. No evidence for pulmonary thromboembolic disease though evaluation of the distal segmental and subsegmental branches is somewhat limited. 3. Gastric band is present. 4. Calcifications in the posterior right lobe of the liver without clear etiology though without evidence for change compared to previous CT 10/11/2022.          Ordered the above noted radiological studies. Reviewed by me in PACS.            PROCEDURES  Procedures    EKG           EKG time/Interp time: 0826/0831  Rhythm/Rate: Sinus rhythm, 79 bpm  P waves and WA: Present, 184 ms  QRS, axis: 97 ms, normal axis  ST and T waves: No ST segment elevations evident    Independently interpreted by me  contemporaneously with treatment          MEDICATIONS GIVEN IN ER  Medications   sodium chloride 0.9 % flush 10 mL (has no administration in time range)   azithromycin (ZITHROMAX) 500 mg in sodium chloride 0.9 % 250 mL IVPB-VTB (has no administration in time range)   ipratropium-albuterol (DUO-NEB) nebulizer solution 3 mL (has no administration in time range)   cefTRIAXone (ROCEPHIN) 1 g in sodium chloride 0.9 % 100 mL IVPB-VTB (0 g Intravenous Stopped 12/15/22 1334)   lactated ringers bolus 1,000 mL (1,000 mL Intravenous New Bag 12/15/22 1231)   iopamidol (ISOVUE-370) 76 % injection 100 mL (95 mL Intravenous Given by Other 12/15/22 1156)                   MEDICAL DECISION MAKING, PROGRESS, and CONSULTS    All labs have been independently reviewed by me.  All radiology studies have been reviewed by me and discussed with radiologist dictating the report.   EKG's independently viewed and interpreted by me.  Discussion below represents my analysis of pertinent findings related to patient's condition, differential diagnosis, treatment plan and final disposition.      My differential diagnosis for chest pain includes but is not limited to:  Muscle strain, costochondritis, myositis, pleurisy, rib fracture, intercostal neuritis, herpes zoster, tumor, myocardial infarction, coronary syndrome, unstable angina, angina, aortic dissection, mitral valve prolapse, pericarditis, palpitations, pulmonary embolus, pneumonia, pneumothorax, lung cancer, GERD, esophagitis, esophageal spasm    My differential diagnosis for cough includes but is not limited to:  Upper respiratory infection, bronchitis, pneumonia, COPD exacerbation, cough variant asthma, cardiac asthma, coronary artery disease, congestive heart failure, bacterial, viral or lung infections, lung cancer, aspiration pneumonitis, aspiration of foreign body and Covid -19          ED Course as of 12/15/22 1342   Thu Dec 15, 2022   0850 Patient presents with atypical right-sided  chest pain and a cough.  I have low suspicion for acute coronary syndrome.  However somewhat worrisome for PE given her past history of DVT.  She is anticoagulated on Eliquis, however and that should be protective.  I interpreted the EKG and do not see any acute ischemic changes.  We will check basic labs as well as CT angiogram of the chest.  Patient does not seem to have any hemoptysis at this time.  We will continue to monitor carefully on telemetry [JUSTIN]   1030 WBC(!): 22.98 [JUSTIN]   1030 Neutrophil Rel %(!): 88.9 [JUSTIN]   1030 Procalcitonin(!): 0.54 [JUSTIN]   1030 Respiratory viral panel is reviewed and no findings of viral infection detected.  Chest x-ray interpreted by me and showing right-sided pneumonia.  I see that her white blood cell count is elevated rather significantly.  Therefore most likely explanation for her chest pain and hemoptysis are pneumonia, rather than cardiac or pulmonary embolus related.  We will start some IV antibiotics and draw blood cultures and lactic acid at this time.  Given hemoptysis features and significantly elevated white blood cell count, will recommend observation.  In the hospital today for continued care. [JUSTIN]   1256 Spoke with A.  They agree to accept her on telemetry for further management today.  Agree with antibiotic selection [JUSTIN]      ED Course User Index  [JUSTIN] Ariel Le MD              Patient was placed in face mask during triage.  Patient was wearing face mask throughout encounter.  I wore personal protective equipment throughout the encounter.  Hand hygiene was performed before and after patient encounter.     DIAGNOSIS  Final diagnoses:   Pneumonia of right lung due to infectious organism, unspecified part of lung   Chest pain, unspecified type   Hemoptysis         DISPOSITION  Admit [facility], [facility]              Latest Documented Vital Signs:  As of 13:42 EST  BP- 152/82 HR- 68 Temp- 97.1 °F (36.2 °C) (Tympanic) O2 sat- 97%        --    Please note that portions of  this were completed with a voice recognition program.          Ariel Le MD  12/15/22 1342      Electronically signed by Ariel Le MD at 12/15/22 1342     Poly Soto, RN at 12/15/22 0858        Pt states she was woken up with an intense coughing spell. The mucus was tinted red. As the coughing continued, pt noticed bright red blood in mucus. Pt did not have a previous cough yesterday and stated she has not been around anyone with the flu or covid to the best of her knowledge.      Pt has a hx of a-fin and DVT's (13 years ago).     Electronically signed by Poly Soto RN at 12/15/22 0913     Poly Soto RN at 12/15/22 1150        RN tried for second set of cultures but was unsuccessful. Teach tried for second set but was also unsuccessful. RN will try again once pt is back from CT.     Electronically signed by Poly Soto RN at 12/15/22 1150     Shakira Moeller, RN at 12/15/22 1219        [facility] phleb at bedside     Electronically signed by Shakira Moeller RN at 12/15/22 1219     Poly Soto, RN at 12/15/22 1514        Pt made aware that sputum sample is needed. Cup is at bedside.     Electronically signed by Poly Soto RN at 12/15/22 1514     Poly oSto RN at 12/15/22 1514          ..Nursing report ED to floor  [facility]  52 y.o.  female    HPI :   Chief Complaint   Patient presents with   • Chest Pain   • Shortness of Breath   • Coughing Up Blood       Admitting doctor:   Tony Williamson MD    Admitting diagnosis:   The primary encounter diagnosis was Pneumonia of right lung due to infectious organism, unspecified part of lung. Diagnoses of Chest pain, unspecified type and Hemoptysis were also pertinent to this visit.    Code status:   Current Code Status       Date Active Code Status Order ID Comments User Context       Not on file            Allergies:   Patient has no known allergies.    Isolation:   No active isolations    Intake and Output  No intake or  "output data in the 24 hours ending 12/15/22 1514    Weight:       12/15/22  0826   Weight: (!) 140 kg (309 lb)       Most recent vitals:   Vitals:    12/15/22 1331 12/15/22 1353 12/15/22 1401 12/15/22 1501   BP: 108/40  (!) 117/36 157/86   Pulse: 74 64 62 65   Resp:       Temp:       TempSrc:       SpO2: 91% 98% 94% 98%   Weight:       Height:           Active LDAs/IV Access:   Lines, Drains & Airways       Active LDAs       Name Placement date Placement time Site Days    Peripheral IV 12/15/22 1148 Right Antecubital 12/15/22  1148  Antecubital  less than 1                    Labs (abnormal labs have a star):   Labs Reviewed   COMPREHENSIVE METABOLIC PANEL - Abnormal; Notable for the following components:       Result Value    Glucose 112 (*)     All other components within normal limits    Narrative:     GFR Normal >60  Chronic Kidney Disease <60  Kidney Failure <15     PROTIME-INR - Abnormal; Notable for the following components:    Protime 14.4 (*)     INR 1.11 (*)     All other components within normal limits   PROCALCITONIN - Abnormal; Notable for the following components:    Procalcitonin 0.54 (*)     All other components within normal limits    Narrative:     As a Marker for Sepsis (Non-Neonates):    1. <0.5 ng/mL represents a low risk of severe sepsis and/or septic shock.  2. >2 ng/mL represents a high risk of severe sepsis and/or septic shock.    As a Marker for Lower Respiratory Tract Infections that require antibiotic therapy:    PCT on Admission    Antibiotic Therapy       6-12 Hrs later    >0.5                Strongly Recommended  >0.25 - <0.5        Recommended   0.1 - 0.25          Discouraged              Remeasure/reassess PCT  <0.1                Strongly Discouraged     Remeasure/reassess PCT    As 28 day mortality risk marker: \"Change in Procalcitonin Result\" (>80% or <=80%) if Day 0 (or Day 1) and Day 4 values are available. Refer to http://www.[email or web address]-pct-calculator.com    Change in PCT <=80%  A " decrease of PCT levels below or equal to 80% defines a positive change in PCT test result representing a higher risk for 28-day all-cause mortality of patients diagnosed with severe sepsis for septic shock.    Change in PCT >80%  A decrease of PCT levels of more than 80% defines a negative change in PCT result representing a lower risk for 28-day all-cause mortality of patients diagnosed with severe sepsis or septic shock.      CBC WITH AUTO DIFFERENTIAL - Abnormal; Notable for the following components:    WBC 22.98 (*)     Neutrophil % 88.9 (*)     Lymphocyte % 5.0 (*)     Eosinophil % 0.0 (*)     Immature Grans % 0.9 (*)     Neutrophils, Absolute 20.41 (*)     Monocytes, Absolute 1.16 (*)     Immature Grans, Absolute 0.21 (*)     All other components within normal limits   RESPIRATORY PANEL PCR W/ COVID-19 (SARS-COV-2) ARMINDA/SYLWIA/SHANTELLE/PAD/COR/MAD/DOC IN-HOUSE, NP SWAB IN UT/VTP, 3-4 HR TAT - Normal    Narrative:     In the setting of a positive respiratory panel with a viral infection PLUS a negative procalcitonin without other underlying concern for bacterial infection, consider observing off antibiotics or discontinuation of antibiotics and continue supportive care. If the respiratory panel is positive for atypical bacterial infection (Bordetella pertussis, Chlamydophila pneumoniae, or Mycoplasma pneumoniae), consider antibiotic de-escalation to target atypical bacterial infection.   TROPONIN (IN-HOUSE) - Normal    Narrative:     Troponin T Reference Range:  <= 0.03 ng/mL-   Negative for AMI  >0.03 ng/mL-     Abnormal for myocardial necrosis.  Clinicians would have to utilize clinical acumen, EKG, Troponin and serial changes to determine if it is an Acute Myocardial Infarction or myocardial injury due to an underlying chronic condition.       Results may be falsely decreased if patient taking Biotin.     BNP (IN-HOUSE) - Normal    Narrative:     Among patients with dyspnea, NT-proBNP is highly sensitive for the  detection of acute congestive heart failure. In addition NT-proBNP of <300 pg/ml effectively rules out acute congestive heart failure with 99% negative predictive value.    Results may be falsely decreased if patient taking Biotin.     LACTIC ACID, PLASMA - Normal   BLOOD CULTURE   BLOOD CULTURE   RESPIRATORY CULTURE   LEGIONELLA ANTIGEN, URINE   STREP PNEUMO AG, URINE OR CSF   MRSA SCREEN, PCR   CBC AND DIFFERENTIAL    Narrative:     The following orders were created for panel order CBC & Differential.  Procedure                               Abnormality         Status                     ---------                               -----------         ------                     CBC Auto Differential[[id number]]        Abnormal            Final result                 Please view results for these tests on the individual orders.       EKG:   ECG 12 Lead Chest Pain   Final Result   HEART RATE= 79  bpm   RR Interval= 759  ms   MA Interval= 184  ms   P Horizontal Axis= -39  deg   P Front Axis= 42  deg   QRSD Interval= 97  ms   QT Interval= 387  ms   QRS Axis= 21  deg   T Wave Axis= 45  deg   - ABNORMAL ECG -   Sinus rhythm   Nonspecific intraventricular conduction delay   When compared with ECG of 11-Oct-2022 11:36:48,   Significant rate increase   Electronically Signed By: Dirk Bobby ([facility]) 15-Dec-2022 14:20:06   Date and Time of Study: 2022-12-15 08:26:45          Meds given in ED:   Medications   sodium chloride 0.9 % flush 10 mL (has no administration in time range)   ipratropium-albuterol (DUO-NEB) nebulizer solution 3 mL (has no administration in time range)   busPIRone (BUSPAR) tablet 15 mg (has no administration in time range)   flecainide (TAMBOCOR) tablet 50 mg (has no administration in time range)   hydroCHLOROthiazide (HYDRODIURIL) tablet 25 mg (has no administration in time range)   metoprolol succinate XL (TOPROL-XL) 24 hr tablet 50 mg (has no administration in time range)   guaiFENesin (MUCINEX) 12 hr tablet  1,200 mg (1,200 mg Oral Given 12/15/22 1409)   Pharmacy to Dose enoxaparin (LOVENOX) (has no administration in time range)   cefTRIAXone (ROCEPHIN) 1 g in sodium chloride 0.9 % 100 mL IVPB-VTB (has no administration in time range)   Enoxaparin Sodium (LOVENOX) syringe 140 mg (has no administration in time range)   cefTRIAXone (ROCEPHIN) 1 g in sodium chloride 0.9 % 100 mL IVPB-VTB (0 g Intravenous Stopped 12/15/22 1334)   azithromycin (ZITHROMAX) 500 mg in sodium chloride 0.9 % 250 mL IVPB-VTB (500 mg Intravenous Given 12/15/22 1410)   lactated ringers bolus 1,000 mL (1,000 mL Intravenous New Bag 12/15/22 1231)   iopamidol (ISOVUE-370) 76 % injection 100 mL (95 mL Intravenous Given by Other 12/15/22 1156)       Imaging results:  XR Chest 1 View    Result Date: 12/15/2022  1. Moderate pneumonia of the right mid to lower lung.  This report was finalized on 12/15/2022 9:37 AM by Dr. Benjamin Murphy M.D.      CT Angiogram Chest    Result Date: 12/15/2022  1. Extensive nodular infiltrate within the right lung involving the right upper lobe, right middle lobe, and right lower lobe. This is favored to be infectious in etiology. There are also mildly enlarged mediastinal lymph nodes which have increased in size when compared to the previous CT angiogram chest 10/11/2022 and are most likely reactive lymph nodes. 2. No evidence for pulmonary thromboembolic disease though evaluation of the distal segmental and subsegmental branches is somewhat limited. 3. Gastric band is present. 4. Calcifications in the posterior right lobe of the liver without clear etiology though without evidence for change compared to previous CT 10/11/2022.  This report was finalized on 12/15/2022 1:44 PM by Dr. Donaldo Kennedy M.D.       Ambulatory status:   - up at [facility]    Social issues:   Social History     Socioeconomic History   • Marital status:    Tobacco Use   • Smoking status: Never   • Smokeless tobacco: Never   Substance and Sexual  Activity   • Alcohol use: Not Currently     Comment: occas   • Drug use: Never   • Sexual activity: Not Currently     Partners: Male     Birth control/protection: Condom, Abstinence, Tubal ligation       NIH Stroke Scale:         Poly Soto RN  12/15/22 15:14 EST         Electronically signed by Poly Soto RN at 12/15/22 1515       Oxygen Therapy (since admission)     Date/Time SpO2 Device (Oxygen Therapy) Flow (L/min) Oxygen Concentration (%) ETCO2 (mmHg)    12/16/22 1300 94 room air -- -- --    12/16/22 0300 -- nasal cannula 2 -- --    12/15/22 2320 -- room air -- -- --    12/15/22 2247 94 room air -- -- --    12/15/22 2059 -- room air -- -- --    12/15/22 2003 97 room air -- -- --    12/15/22 1733 97 room air -- -- --    12/15/22 1501 98 -- -- -- --    12/15/22 1401 94 -- -- -- --    12/15/22 1353 98 -- -- -- --    12/15/22 1331 91 -- -- -- --    12/15/22 1232 97 room air -- -- --    12/15/22 1001 95 -- -- -- --    12/15/22 08:42:50 94 room air -- -- --    12/15/22 0821 91 room air -- -- --        Intake & Output (last 2 days)       12/14 0701  12/15 0700 12/15 0701  12/16 0700 12/16 0701  12/17 0700    P.O.  360 720    IV Piggyback  1000     Total Intake(mL/kg)  1360 (9.7) 720 (5.1)    Net  +1360 +720               Lines, Drains & Airways     Active LDAs     Name Placement date Placement time Site Days    Peripheral IV 12/15/22 1148 Right Antecubital 12/15/22  1148  Antecubital  1                  Medication Administration Report for Sarah Frey as of 12/16/22 3472   Legend:    Given Hold Not Given Due Canceled Entry Other Actions    Time Time (Time) Time  Time-Action       Discontinued     Completed     Future     MAR Hold     Linked           Medications 12/14/22 12/15/22 12/16/22    busPIRone (BUSPAR) tablet 15 mg  Dose: 15 mg  Freq: 2 Times Daily Route: PO  Start: 12/15/22 2100   Admin Instructions:   Caution: Look alike/sound alike drug alert. Take with food.  Avoid grapefruit juice.      (2056)-Not Given [C]          0933-Given     2100            cefTRIAXone (ROCEPHIN) 1 g in sodium chloride 0.9 % 100 mL IVPB-VTB  Dose: 1 g  Freq: Every 24 Hours Route: IV  Indications of Use: PNEUMONIA  Start: 12/16/22 1030   End: 12/20/22 1029   Admin Instructions:   LR should be paused and flushing of the line with NS is recommended prior to and after completion of ceftriaxone infusion due to incompatibility. Do not co-adminster with calcium-containing solutions.  Caution: Look alike/sound alike drug alert      1135-New Bag             flecainide (TAMBOCOR) tablet 50 mg  Dose: 50 mg  Freq: Every 12 Hours Route: PO  Start: 12/15/22 2100     (2057)-Not Given [C]          0933-Given     2100            guaiFENesin (MUCINEX) 12 hr tablet 1,200 mg  Dose: 1,200 mg  Freq: Every 12 Hours Scheduled Route: PO  Start: 12/15/22 1350   Admin Instructions:   Caution: Look alike/sound alike drug alert               Do not crush, split, or chew.     1409-Given     2053-Given         0933-Given     2100            hydroCHLOROthiazide (HYDRODIURIL) tablet 25 mg  Dose: 25 mg  Freq: Daily Route: PO  Start: 12/16/22 0900   Admin Instructions:   Caution: Look alike/sound alike drug alert      0933-Given             ipratropium-albuterol (DUO-NEB) nebulizer solution 3 mL  Dose: 3 mL  Freq: Every 4 Hours PRN Route: NEBULIZATION  PRN Reasons: Wheezing,Shortness of Air  Start: 12/15/22 1340          metoprolol succinate XL (TOPROL-XL) 24 hr tablet 50 mg  Dose: 50 mg  Freq: Daily Route: PO  Start: 12/16/22 0900   Admin Instructions:   Do not crush or chew.      0933-Given             sodium chloride 0.9 % flush 10 mL  Dose: 10 mL  Freq: As Needed Route: IV  PRN Reason: Line Care  Start: 12/15/22 0841         Completed Medications  Medications 12/14/22 12/15/22 12/16/22       azithromycin (ZITHROMAX) 500 mg in sodium chloride 0.9 % 250 mL IVPB-VTB  Dose: 500 mg  Freq: Once Route: IV  Indications of Use: PNEUMONIA  Start: 12/15/22 1030    End: 12/15/22 1510     1410-Given              cefTRIAXone (ROCEPHIN) 1 g in sodium chloride 0.9 % 100 mL IVPB-VTB  Dose: 1 g  Freq: Once Route: IV  Indications of Use: PNEUMONIA  Start: 12/15/22 1032   End: 12/15/22 1334   Admin Instructions:   LR should be paused and flushing of the line with NS is recommended prior to and after completion of ceftriaxone infusion due to incompatibility. Do not co-adminster with calcium-containing solutions.  Caution: Look alike/sound alike drug alert     1232-New Bag     1334-Stopped             iopamidol (ISOVUE-370) 76 % injection 100 mL  Dose: 100 mL  Freq: Once in Imaging Route: IV  Start: 12/15/22 1158   End: 12/15/22 1156     1156-Given by Other              lactated ringers bolus 1,000 mL  Dose: 1,000 mL  Freq: Once Route: IV  Start: 12/15/22 1032   End: 12/15/22 1716     1231-New Bag     1716-Stopped            Discontinued Medications  Medications 12/14/22 12/15/22 12/16/22       apixaban (ELIQUIS) tablet 5 mg  Dose: 5 mg  Freq: Every 12 Hours Scheduled Route: PO  Indications of Use: ATRIAL FIBRILLATION  Start: 12/15/22 2100   End: 12/15/22 1403   Admin Instructions:   Tablet may be crushed and suspended in 60 mL of water or D5W and immediately delivered via NG tube.          Enoxaparin Sodium (LOVENOX) syringe 140 mg  Dose: 1 mg/kg  Weight Dosing Info: 140 kg  Freq: Every 12 Hours Route: SC  Indications of Use: ATRIAL FIBRILLATION  Start: 12/15/22 2100   End: 12/16/22 0957   Admin Instructions:   Give subcutaneous in abdomen only. Do not massage site after injection.     2053-Given          0932-Given             Pharmacy to Dose enoxaparin (LOVENOX)  Freq: Continuous PRN Route: XX  PRN Reason: Consult  Indications of Use: ATRIAL FIBRILLATION  Start: 12/15/22 1800   End: 12/16/22 0850                 Blood Administration Record (From admission, onward)    None        Operative/Procedure Notes (all)    No notes of this type exist for this encounter.            Physician  Progress Notes (all)      Tony Williamson MD at 22 1428              Name: Sarah Frey ADMIT: 12/15/2022   : 1970  PCP: Yousuf Dhillon MD    MRN: [id number] LOS: 0 days   AGE/SEX: 52 y.o. female  ROOM: [facility]     Subjective   Subjective     No events overnight. She feels well, but she shows me a container with about an ounce of bright red blood in it and she says that she expectorated this much in 2 coughs. hgb is stable. Leukocytosis is improved      Objective   Objective   Vital Signs  Temp:  [97.4 °F (36.3 °C)-98.5 °F (36.9 °C)] 98.3 °F (36.8 °C)  Heart Rate:  [60-68] 68  Resp:  [16-18] 16  BP: (107-157)/(52-86) 110/77  SpO2:  [94 %-98 %] 94 %  on  Flow (L/min):  [2] 2;   Device (Oxygen Therapy): room air  Body mass index is 49.72 kg/m².  Physical Exam  Constitutional:       General: She is not in acute distress.     Appearance: She is not toxic-appearing.   Cardiovascular:      Rate and Rhythm: Normal rate and regular rhythm.      Heart sounds: Normal heart sounds.   Pulmonary:      Effort: Pulmonary effort is normal. No respiratory distress.      Breath sounds: Normal breath sounds. No wheezing, rhonchi or rales.   Abdominal:      General: Bowel sounds are normal. There is no distension.      Palpations: Abdomen is soft.      Tenderness: There is no abdominal tenderness.   Musculoskeletal:         General: No tenderness.      Right lower leg: No edema.      Left lower leg: No edema.   Neurological:      Mental Status: She is alert.   Psychiatric:         Mood and Affect: Mood normal.         Behavior: Behavior normal.         Results Review     I reviewed the patient's new clinical results.  Results from last 7 days   Lab Units 22  0905 12/15/22  0853 22  1224   WBC 10*3/mm3 14.06* 22.98* 8.87   HEMOGLOBIN g/dL 12.3 12.1 12.7   PLATELETS 10*3/mm3 223 232 252     Results from last 7 days   Lab Units 22  0905 12/15/22  0853 22  1224   SODIUM mmol/L 138 139 143    POTASSIUM mmol/L 3.5 4.3 4.4   CHLORIDE mmol/L 103 103 106   TOTAL CO2 mmol/L  --   --  27.2   CO2 mmol/L 27.0 24.2  --    BUN mg/dL 9 11 13   CREATININE mg/dL 0.80 0.83 0.80   GLUCOSE mg/dL 112* 112* 85   Estimated Creatinine Clearance: 121.7 mL/min (by C-G formula based on SCr of 0.8 mg/dL).  Results from last 7 days   Lab Units 12/15/22  0853 12/13/22  1224   ALBUMIN g/dL 4.00 4.00   BILIRUBIN mg/dL 0.6 0.4   ALK PHOS U/L 73 82   AST (SGOT) U/L 20 16   ALT (SGPT) U/L 13 9     Results from last 7 days   Lab Units 12/16/22  0905 12/15/22  0853 12/13/22  1224   CALCIUM mg/dL 8.7 9.0 9.7   ALBUMIN g/dL  --  4.00 4.00     Results from last 7 days   Lab Units 12/15/22  1103 12/15/22  0853   PROCALCITONIN ng/mL  --  0.54*   LACTATE mmol/L 1.0  --      COVID19   Date Value Ref Range Status   12/15/2022 Not Detected Not Detected - Ref. Range Final     No results found for: HGBA1C, POCGLU    CT Angiogram Chest  Narrative: CT ANGIOGRAM OF THE CHEST. MULTIPLE CORONAL, SAGITTAL, AND 3D  RECONSTRUCTIONS     HISTORY: Dyspnea.     TECHNIQUE: Radiation dose reduction techniques were utilized, including  automated exposure control and exposure modulation based on body size.   CT angiogram of the chest was performed following the administration of  IV contrast. Coronal, sagittal, and 3D reconstruction images were  obtained.     COMPARISON:  CT angiogram chest 10/11/2022.     FINDINGS: Evaluation of the distal segmental and subsegmental pulmonary  arterial branches is limited by motion related artifact as well as  streak artifact related to the patient's body type. There is no evidence  for large or central pulmonary embolus. There is mild mediastinal ana  enlargement. Right paratracheal node in the superior mediastinum  measures 1.2 cm in AP dimension and on the prior exam measured 0.8 cm. A  prevascular space node measures 0.8 cm and previously measured 0.6 cm.  Subcarinal node measures 1.1 cm AP dimension and previously  measured 0.9  cm. There is no axillary ana enlargement.     There is extensive hazy, fluffy ill-defined nodular airspace disease  within the right upper lobe, right middle lobe, and right lower lobe.  This is consistent with multilobar pneumonia in the proper clinical  setting. There is no evidence for infiltrate on the left. There is no  pleural effusion.     Imaging through the upper abdomen demonstrates a gastric band. There are  calcifications in the posterior right lobe of the liver which were also  present on the previous CT 10/11/2022 without evidence for change.     Impression: 1. Extensive nodular infiltrate within the right lung involving the  right upper lobe, right middle lobe, and right lower lobe. This is  favored to be infectious in etiology. There are also mildly enlarged  mediastinal lymph nodes which have increased in size when compared to  the previous CT angiogram chest 10/11/2022 and are most likely reactive  lymph nodes.  2. No evidence for pulmonary thromboembolic disease though evaluation of  the distal segmental and subsegmental branches is somewhat limited.  3. Gastric band is present.   4. Calcifications in the posterior right lobe of the liver without clear  etiology though without evidence for change compared to previous CT  10/11/2022.     This report was finalized on 12/15/2022 1:44 PM by Dr. Donaldo Kennedy M.D.     XR Chest 1 View  Narrative: XR CHEST 1 VW-  12/15/2022     HISTORY: Shortness of breath.     There is moderate patchy infiltrate in the right mid to lower lung  consistent with pneumonia. Right apex and left lung appears largely  clear.     Heart size is at the upper limits of normal.     Impression: 1. Moderate pneumonia of the right mid to lower lung.     This report was finalized on 12/15/2022 9:37 AM by Dr. Benjamin Murphy M.D.       Scheduled Medications  busPIRone, 15 mg, Oral, BID  cefTRIAXone, 1 g, Intravenous, Q24H  flecainide, 50 mg, Oral,  Q12H  guaiFENesin, 1,200 mg, Oral, Q12H  hydroCHLOROthiazide, 25 mg, Oral, Daily  metoprolol succinate XL, 50 mg, Oral, Daily    Infusions   Diet  Diet: Regular/House Diet; Texture: Regular Texture (IDDSI 7); Fluid Consistency: Thin (IDDSI 0)      Assessment/Plan     Active Hospital Problems    Diagnosis  POA   • **Pneumonia [J18.9]  Yes   • Sepsis due to pneumonia (HCC) [J18.9, A41.9]  Yes   • Abnormal liver diagnostic imaging [R93.2]  Yes   • Chronic anticoagulation [Z79.01]  Not Applicable   • Hypertensive disorder [I10]  Yes   • Deep venous thrombosis (HCC) [I82.409]  Yes   • Personal history of other venous thrombosis and embolism [Z86.718]  Not Applicable   • Obstructive sleep apnea syndrome [G47.33]  Yes   • Paroxysmal atrial fibrillation (HCC) [I48.0]  Yes   • Benign essential hypertension [I10]  Yes   • Mitral valve regurgitation [I34.0]  Yes      Resolved Hospital Problems   No resolved problems to display.       52 y.o. female admitted with Pneumonia.    · Pneumonia-leukocytosis is improving. Urine antigens and mrsa pcr were negative. Continue ceftriaxone.  · Hemoptysis-secondary to the above. Hold lovenox.  · Paroxysmal afib-hold lovenox as above. Continue flecainide and metoprolol  · Essential hypertension-well controlled  · History of dvt  · MANJIT  · Obesity s/p lap banding  · Anxiety/depression  · SCDs for DVT prophylaxis.  · Full code.  · Discussed with patient and nursing staff.      Tony Williamson MD  [facility]  12/16/22  14:28 EST    I wore protective equipment throughout this patient encounter including a face mask, gloves and protective eyewear.  Hand hygiene was performed before donning protective equipment and after removal when leaving the room.        Electronically signed by Tony Williamson MD at 12/16/22 2137

## 2022-12-17 ENCOUNTER — READMISSION MANAGEMENT (OUTPATIENT)
Dept: CALL CENTER | Facility: HOSPITAL | Age: 52
End: 2022-12-17

## 2022-12-17 VITALS
OXYGEN SATURATION: 95 % | RESPIRATION RATE: 16 BRPM | HEIGHT: 66 IN | HEART RATE: 47 BPM | WEIGHT: 293 LBS | SYSTOLIC BLOOD PRESSURE: 122 MMHG | BODY MASS INDEX: 47.09 KG/M2 | TEMPERATURE: 98 F | DIASTOLIC BLOOD PRESSURE: 75 MMHG

## 2022-12-17 PROBLEM — J18.9 PNEUMONIA OF RIGHT LUNG DUE TO INFECTIOUS ORGANISM, UNSPECIFIED PART OF LUNG: Status: ACTIVE | Noted: 2022-12-17

## 2022-12-17 LAB
DEPRECATED RDW RBC AUTO: 42.2 FL (ref 37–54)
ERYTHROCYTE [DISTWIDTH] IN BLOOD BY AUTOMATED COUNT: 12.8 % (ref 12.3–15.4)
HCT VFR BLD AUTO: 35.1 % (ref 34–46.6)
HGB BLD-MCNC: 11.9 G/DL (ref 12–15.9)
MCH RBC QN AUTO: 30.2 PG (ref 26.6–33)
MCHC RBC AUTO-ENTMCNC: 33.9 G/DL (ref 31.5–35.7)
MCV RBC AUTO: 89.1 FL (ref 79–97)
PLATELET # BLD AUTO: 215 10*3/MM3 (ref 140–450)
PMV BLD AUTO: 11.1 FL (ref 6–12)
RBC # BLD AUTO: 3.94 10*6/MM3 (ref 3.77–5.28)
WBC NRBC COR # BLD: 8.49 10*3/MM3 (ref 3.4–10.8)

## 2022-12-17 PROCEDURE — G0378 HOSPITAL OBSERVATION PER HR: HCPCS

## 2022-12-17 PROCEDURE — 85027 COMPLETE CBC AUTOMATED: CPT | Performed by: STUDENT IN AN ORGANIZED HEALTH CARE EDUCATION/TRAINING PROGRAM

## 2022-12-17 RX ORDER — CEFDINIR 300 MG/1
300 CAPSULE ORAL 2 TIMES DAILY
Qty: 10 CAPSULE | Refills: 0 | Status: SHIPPED | OUTPATIENT
Start: 2022-12-17 | End: 2022-12-22

## 2022-12-17 RX ADMIN — HYDROCHLOROTHIAZIDE 25 MG: 25 TABLET ORAL at 08:07

## 2022-12-17 RX ADMIN — BUSPIRONE HYDROCHLORIDE 15 MG: 15 TABLET ORAL at 08:07

## 2022-12-17 RX ADMIN — GUAIFENESIN 1200 MG: 600 TABLET, EXTENDED RELEASE ORAL at 08:07

## 2022-12-17 RX ADMIN — FLECAINIDE ACETATE TABLET 50 MG: 50 TABLET ORAL at 08:07

## 2022-12-17 NOTE — DISCHARGE SUMMARY
Patient Name: Sarah Frey  : 1970  MRN: [id number]    Date of Admission: 12/15/2022  Date of Discharge:  2022  Primary Care Physician: Yousuf Dhillon MD      Chief Complaint:   Chest Pain, Shortness of Breath, and Coughing Up Blood      Discharge Diagnoses     Active Hospital Problems    Diagnosis  POA   • **Pneumonia [J18.9]  Yes   • Sepsis due to pneumonia (HCC) [J18.9, A41.9]  Yes   • Abnormal liver diagnostic imaging [R93.2]  Yes   • Chronic anticoagulation [Z79.01]  Not Applicable   • Hypertensive disorder [I10]  Yes   • Deep venous thrombosis (HCC) [I82.409]  Yes   • Personal history of other venous thrombosis and embolism [Z86.718]  Not Applicable   • Obstructive sleep apnea syndrome [G47.33]  Yes   • Paroxysmal atrial fibrillation (HCC) [I48.0]  Yes   • Benign essential hypertension [I10]  Yes   • Mitral valve regurgitation [I34.0]  Yes      Resolved Hospital Problems   No resolved problems to display.        Hospital Course     Ms. Frey is a 52 y.o. female with a history of obesity, paroxysmal A. fib, hypertension, obstructive sleep apnea and prior DVT who presented to [facility] initially complaining of chest pain shortness of breath and coughing up blood.  Please see the admitting history and physical for further details.  She was found to have pneumonia and hemoptysis and was admitted to the hospital for further evaluation and treatment.  She is on Eliquis for paroxysmal A. fib and this was held on admission.  She was treated with appropriate antibiotic therapy and urine antigens and microbiologic work-up was negative for source of infection.  She responded well to Rocephin will be discharging on Omnicef.  Given her symptomatic improvement and resolution of her hemoptysis as long as she continues without bleeding today she can restart her Eliquis tomorrow.  She is not requiring any supplemental oxygen and wants to go home today I do not see a contraindication  with that at this time.  Have asked her to follow-up with her primary care physician and follow-up by phone with her cardiologist in the next few weeks.      Day of Discharge     Subjective:  Overall she is feeling much better.  Denies active hemoptysis currently.  Still having some lindsey colored sputum at times but feeling better afebrile and wants to go home    Physical Exam:  Temp:  [97.3 °F (36.3 °C)-98.4 °F (36.9 °C)] 98 °F (36.7 °C)  Heart Rate:  [47-70] 47  Resp:  [16] 16  BP: (101-127)/(65-77) 122/75  Body mass index is 49.72 kg/m².  Physical Exam  Vitals and nursing note reviewed.   Constitutional:       Appearance: Normal appearance.   Cardiovascular:      Rate and Rhythm: Normal rate and regular rhythm.   Pulmonary:      Effort: No respiratory distress.      Breath sounds: Normal breath sounds.   Abdominal:      General: Bowel sounds are normal.      Palpations: Abdomen is soft.   Neurological:      General: No focal deficit present.      Mental Status: She is alert and oriented to person, place, and time.         Consultants     Consult Orders (all) (From admission, onward)     Start     Ordered    12/15/22 1825  Inpatient Case Management  Consult  Once        Provider:  (Not yet assigned)    12/15/22 1824    12/15/22 1823  Inpatient Consult to Advance Care Planning  Once        Provider:  (Not yet assigned)    12/15/22 1822    12/15/22 1234  [facility] (on-call MD unless specified) Details  Once        Specialty:  Hospitalist  Provider:  (Not yet assigned)    12/15/22 1233              Procedures     * Surgery not found *      Imaging Results (All)     Procedure Component Value Units Date/Time    CT Angiogram Chest [[id number]] Collected: 12/15/22 1301     Updated: 12/15/22 1347    Narrative:      CT ANGIOGRAM OF THE CHEST. MULTIPLE CORONAL, SAGITTAL, AND 3D  RECONSTRUCTIONS     HISTORY: Dyspnea.     TECHNIQUE: Radiation dose reduction techniques were utilized, including  automated exposure  control and exposure modulation based on body size.   CT angiogram of the chest was performed following the administration of  IV contrast. Coronal, sagittal, and 3D reconstruction images were  obtained.     COMPARISON:  CT angiogram chest 10/11/2022.     FINDINGS: Evaluation of the distal segmental and subsegmental pulmonary  arterial branches is limited by motion related artifact as well as  streak artifact related to the patient's body type. There is no evidence  for large or central pulmonary embolus. There is mild mediastinal ana  enlargement. Right paratracheal node in the superior mediastinum  measures 1.2 cm in AP dimension and on the prior exam measured 0.8 cm. A  prevascular space node measures 0.8 cm and previously measured 0.6 cm.  Subcarinal node measures 1.1 cm AP dimension and previously measured 0.9  cm. There is no axillary ana enlargement.     There is extensive hazy, fluffy ill-defined nodular airspace disease  within the right upper lobe, right middle lobe, and right lower lobe.  This is consistent with multilobar pneumonia in the proper clinical  setting. There is no evidence for infiltrate on the left. There is no  pleural effusion.     Imaging through the upper abdomen demonstrates a gastric band. There are  calcifications in the posterior right lobe of the liver which were also  present on the previous CT 10/11/2022 without evidence for change.       Impression:      1. Extensive nodular infiltrate within the right lung involving the  right upper lobe, right middle lobe, and right lower lobe. This is  favored to be infectious in etiology. There are also mildly enlarged  mediastinal lymph nodes which have increased in size when compared to  the previous CT angiogram chest 10/11/2022 and are most likely reactive  lymph nodes.  2. No evidence for pulmonary thromboembolic disease though evaluation of  the distal segmental and subsegmental branches is somewhat limited.  3. Gastric band is  present.   4. Calcifications in the posterior right lobe of the liver without clear  etiology though without evidence for change compared to previous CT  10/11/2022.     This report was finalized on 12/15/2022 1:44 PM by Dr. Donaldo Kennedy M.D.       XR Chest 1 View [[id number]] Collected: 12/15/22 0937     Updated: 12/15/22 0940    Narrative:      XR CHEST 1 VW-  12/15/2022     HISTORY: Shortness of breath.     There is moderate patchy infiltrate in the right mid to lower lung  consistent with pneumonia. Right apex and left lung appears largely  clear.     Heart size is at the upper limits of normal.       Impression:      1. Moderate pneumonia of the right mid to lower lung.     This report was finalized on 12/15/2022 9:37 AM by Dr. Benjamin Murphy M.D.               Pertinent Labs     Results from last 7 days   Lab Units 12/16/22  0905 12/15/22  0853 12/13/22  1224   WBC 10*3/mm3 14.06* 22.98* 8.87   HEMOGLOBIN g/dL 12.3 12.1 12.7   PLATELETS 10*3/mm3 223 232 252     Results from last 7 days   Lab Units 12/16/22  0905 12/15/22  0853 12/13/22  1224   SODIUM mmol/L 138 139 143   POTASSIUM mmol/L 3.5 4.3 4.4   CHLORIDE mmol/L 103 103 106   TOTAL CO2 mmol/L  --   --  27.2   CO2 mmol/L 27.0 24.2  --    BUN mg/dL 9 11 13   CREATININE mg/dL 0.80 0.83 0.80   GLUCOSE mg/dL 112* 112* 85   EGFR mL/min/1.73 89.9 86.0  --      Results from last 7 days   Lab Units 12/15/22  0853 12/13/22  1224   ALBUMIN g/dL 4.00 4.00   BILIRUBIN mg/dL 0.6 0.4   ALK PHOS U/L 73 82   AST (SGOT) U/L 20 16   ALT (SGPT) U/L 13 9     Results from last 7 days   Lab Units 12/16/22  0905 12/15/22  0853 12/13/22  1224   CALCIUM mg/dL 8.7 9.0 9.7   ALBUMIN g/dL  --  4.00 4.00       Results from last 7 days   Lab Units 12/15/22  0853   TROPONIN T ng/mL <0.010   PROBNP pg/mL 147.0       Results from last 7 days   Lab Units 12/13/22  1224   TRIGLYCERIDES mg/dL 48   HDL CHOL mg/dL 43   LDL CHOL mg/dL 103*     Results from last 7 days   Lab Units  12/15/22  1827 12/15/22  1225 12/15/22  1103   BLOODCX   --  No growth at 24 hours No growth at 24 hours   MRSAPCR  No MRSA Detected  --   --      Results from last 7 days   Lab Units 12/15/22  0854   COVID19  Not Detected       Test Results Pending at Discharge     Pending Labs     Order Current Status    CBC (No Diff) In process    Blood Culture - Blood, Arm, Left Preliminary result    Blood Culture - Blood, Arm, Left Preliminary result          Discharge Details        Discharge Medications      New Medications      Instructions Start Date   cefdinir 300 MG capsule  Commonly known as: OMNICEF   300 mg, Oral, 2 Times Daily         Continue These Medications      Instructions Start Date   acetaminophen 500 MG tablet  Commonly known as: TYLENOL   500 mg, Oral, As Needed      apixaban 5 MG tablet tablet  Commonly known as: ELIQUIS   5 mg, Oral, 2 Times Daily      ascorbic acid 500 MG tablet  Commonly known as: VITAMIN C   500 mg, Oral, As Needed      busPIRone 15 MG tablet  Commonly known as: BUSPAR   15 mg, Oral, 2 Times Daily      flecainide 50 MG tablet  Commonly known as: TAMBOCOR   50 mg, Oral, Every 12 Hours      hydroCHLOROthiazide 25 MG tablet  Commonly known as: HYDRODIURIL   25 mg, Oral, Daily      metoprolol succinate XL 50 MG 24 hr tablet  Commonly known as: TOPROL-XL   Take 1 tablet by mouth once daily      multivitamin tablet tablet   1 tablet, Oral, As Needed             No Known Allergies    Discharge Disposition:  Home or Self Care      Discharge Diet:  Diet Order   Procedures   • Diet: Regular/House Diet; Texture: Regular Texture (IDDSI 7); Fluid Consistency: Thin (IDDSI 0)       Discharge Activity:   Activity Instructions     Activity as Tolerated            CODE STATUS:    Code Status and Medical Interventions:   Ordered at: 12/16/22 0840     Level Of Support Discussed With:    Patient     Code Status (Patient has no pulse and is not breathing):    CPR (Attempt to Resuscitate)     Medical  Interventions (Patient has pulse or is breathing):    Full Support       Future Appointments   Date Time Provider Department [place]   5/15/2023 10:00 AM Yousuf Dhillon MD K  [facility]     Additional Instructions for the Follow-ups that You Need to Schedule     Discharge Follow-up with PCP   As directed       Currently Documented PCP:    Yousuf Dhillon MD    PCP Phone Number:    [phone]     Follow Up Details: 2-3 weeks            Follow-up Information     Yousuf Dhillon MD .    Specialty: Internal Medicine  Contact information:  [address] [place] [address]  [phone]             Jarrod Weldon MD. Schedule an appointment as soon as possible for a visit.    Specialties: Pulmonary Disease, Sleep Medicine  Contact information:  [address] [address]  [phone]             Yousuf Dhillon MD .    Specialty: Internal Medicine  Why: 2-3 weeks  Contact information:  [address] [place] [address]  [phone]                         Additional Instructions for the Follow-ups that You Need to Schedule     Discharge Follow-up with PCP   As directed       Currently Documented PCP:    Yousuf Dhillon MD    PCP Phone Number:    [phone]     Follow Up Details: 2-3 weeks           Time Spent on Discharge:  Greater than 30 minutes      Charles Sanabria MD  [facility]  12/17/22  08:25 EST

## 2022-12-17 NOTE — PLAN OF CARE
Problem: Adult Inpatient Plan of Care  Goal: Plan of Care Review  Flowsheets (Taken 12/17/2022 1203)  Outcome Evaluation: AOX4. IV removed. AVS reviewed. Room air. Patient states she feels well enough to drive home.   Goal Outcome Evaluation:              Outcome Evaluation: AOX4. IV removed. AVS reviewed. Room air. Patient states she feels well enough to drive home.

## 2022-12-18 NOTE — OUTREACH NOTE
Prep Survey    Flowsheet Row Responses   [facility] patient discharged from? [place]   Is LACE score < 7 ? No   Eligibility [facility]   Date of Admission 12/15/22   Date of Discharge 12/17/22   Discharge Disposition Home or Self Care   Discharge diagnosis Pneumonia- sepsis   Does the patient have one of the following disease processes/diagnoses(primary or secondary)? Sepsis   Does the patient have Home health ordered? No   Is there a DME ordered? No   Prep survey completed? Yes          VINITA MELISSA - Registered Nurse

## 2022-12-18 NOTE — PAYOR COMM NOTE
"Portillo Nuñez (52 y.o. Female)     PLEASE SEE ATTACHED DC SUMMARY    REF#[id number]    THANK YOU    LISSETTE RAMIREZ LPN CCP    Date of Birth   1970    Social Security Number       Address   [address] [place] [address]    Home Phone   [phone]    MRN   [id number]       Confucianism   None    Marital Status                               Admission Date   12/15/22    Admission Type   Emergency    Admitting Provider   Tony Williamson MD    Attending Provider       Department, Room/Bed   [address], [address]/1       Discharge Date   2022    Discharge Disposition   Home or Self Care    Discharge Destination                               Attending Provider: (none)   Allergies: No Known Allergies    Isolation: None   Infection: None   Code Status: Prior    Ht: 167.6 cm (66\")   Wt: 140 kg (308 lb 1 oz)    Admission Cmt: None   Principal Problem: Pneumonia [J18.9]                 Active Insurance as of 12/15/2022     Primary Coverage     Payor Plan Insurance Group Employer/Plan Group    [employer] [facility] [employer] [facility] [id number]     Payor Plan Address Payor Plan Phone Number Payor Plan Fax Number Effective Dates    [address] [phone]  2019 - None Entered    [place] [address]       Subscriber Name Subscriber Birth Date Member ID       PORTILLO NUÑEZ 1970 [id number]                 Emergency Contacts      (Rel.) Home Phone Work Phone Mobile Phone    CLAUDIO HIGHTOWER (Mother) -- -- [phone]               Discharge Summary      Charles Sanabria MD at 22 0825              Patient Name: Portillo Nuñez  : 1970  MRN: [id number]    Date of Admission: 12/15/2022  Date of Discharge:  2022  Primary Care Physician: Yousuf Dhillon MD      Chief Complaint:   Chest Pain, Shortness of Breath, and Coughing Up Blood      Discharge Diagnoses     Active Hospital Problems    Diagnosis  POA   • **Pneumonia " [J18.9]  Yes   • Sepsis due to pneumonia (HCC) [J18.9, A41.9]  Yes   • Abnormal liver diagnostic imaging [R93.2]  Yes   • Chronic anticoagulation [Z79.01]  Not Applicable   • Hypertensive disorder [I10]  Yes   • Deep venous thrombosis (HCC) [I82.409]  Yes   • Personal history of other venous thrombosis and embolism [Z86.718]  Not Applicable   • Obstructive sleep apnea syndrome [G47.33]  Yes   • Paroxysmal atrial fibrillation (HCC) [I48.0]  Yes   • Benign essential hypertension [I10]  Yes   • Mitral valve regurgitation [I34.0]  Yes      Resolved Hospital Problems   No resolved problems to display.        Hospital Course     Ms. Frey is a 52 y.o. female with a history of obesity, paroxysmal A. fib, hypertension, obstructive sleep apnea and prior DVT who presented to [facility] initially complaining of chest pain shortness of breath and coughing up blood.  Please see the admitting history and physical for further details.  She was found to have pneumonia and hemoptysis and was admitted to the hospital for further evaluation and treatment.  She is on Eliquis for paroxysmal A. fib and this was held on admission.  She was treated with appropriate antibiotic therapy and urine antigens and microbiologic work-up was negative for source of infection.  She responded well to Rocephin will be discharging on Omnicef.  Given her symptomatic improvement and resolution of her hemoptysis as long as she continues without bleeding today she can restart her Eliquis tomorrow.  She is not requiring any supplemental oxygen and wants to go home today I do not see a contraindication with that at this time.  Have asked her to follow-up with her primary care physician and follow-up by phone with her cardiologist in the next few weeks.      Day of Discharge     Subjective:  Overall she is feeling much better.  Denies active hemoptysis currently.  Still having some lindsey colored sputum at times but feeling better afebrile and  wants to go home    Physical Exam:  Temp:  [97.3 °F (36.3 °C)-98.4 °F (36.9 °C)] 98 °F (36.7 °C)  Heart Rate:  [47-70] 47  Resp:  [16] 16  BP: (101-127)/(65-77) 122/75  Body mass index is 49.72 kg/m².  Physical Exam  Vitals and nursing note reviewed.   Constitutional:       Appearance: Normal appearance.   Cardiovascular:      Rate and Rhythm: Normal rate and regular rhythm.   Pulmonary:      Effort: No respiratory distress.      Breath sounds: Normal breath sounds.   Abdominal:      General: Bowel sounds are normal.      Palpations: Abdomen is soft.   Neurological:      General: No focal deficit present.      Mental Status: She is alert and oriented to person, place, and time.         Consultants     Consult Orders (all) (From admission, onward)     Start     Ordered    12/15/22 1825  Inpatient Case Management  Consult  Once        Provider:  (Not yet assigned)    12/15/22 1824    12/15/22 1823  Inpatient Consult to Advance Care Planning  Once        Provider:  (Not yet assigned)    12/15/22 1822    12/15/22 1234  [facility] (on-call MD unless specified) Details  Once        Specialty:  Hospitalist  Provider:  (Not yet assigned)    12/15/22 1233              Procedures     * Surgery not found *      Imaging Results (All)     Procedure Component Value Units Date/Time    CT Angiogram Chest [[id number]] Collected: 12/15/22 1301     Updated: 12/15/22 1347    Narrative:      CT ANGIOGRAM OF THE CHEST. MULTIPLE CORONAL, SAGITTAL, AND 3D  RECONSTRUCTIONS     HISTORY: Dyspnea.     TECHNIQUE: Radiation dose reduction techniques were utilized, including  automated exposure control and exposure modulation based on body size.   CT angiogram of the chest was performed following the administration of  IV contrast. Coronal, sagittal, and 3D reconstruction images were  obtained.     COMPARISON:  CT angiogram chest 10/11/2022.     FINDINGS: Evaluation of the distal segmental and subsegmental pulmonary  arterial branches is  limited by motion related artifact as well as  streak artifact related to the patient's body type. There is no evidence  for large or central pulmonary embolus. There is mild mediastinal ana  enlargement. Right paratracheal node in the superior mediastinum  measures 1.2 cm in AP dimension and on the prior exam measured 0.8 cm. A  prevascular space node measures 0.8 cm and previously measured 0.6 cm.  Subcarinal node measures 1.1 cm AP dimension and previously measured 0.9  cm. There is no axillary ana enlargement.     There is extensive hazy, fluffy ill-defined nodular airspace disease  within the right upper lobe, right middle lobe, and right lower lobe.  This is consistent with multilobar pneumonia in the proper clinical  setting. There is no evidence for infiltrate on the left. There is no  pleural effusion.     Imaging through the upper abdomen demonstrates a gastric band. There are  calcifications in the posterior right lobe of the liver which were also  present on the previous CT 10/11/2022 without evidence for change.       Impression:      1. Extensive nodular infiltrate within the right lung involving the  right upper lobe, right middle lobe, and right lower lobe. This is  favored to be infectious in etiology. There are also mildly enlarged  mediastinal lymph nodes which have increased in size when compared to  the previous CT angiogram chest 10/11/2022 and are most likely reactive  lymph nodes.  2. No evidence for pulmonary thromboembolic disease though evaluation of  the distal segmental and subsegmental branches is somewhat limited.  3. Gastric band is present.   4. Calcifications in the posterior right lobe of the liver without clear  etiology though without evidence for change compared to previous CT  10/11/2022.     This report was finalized on 12/15/2022 1:44 PM by Dr. Donaldo Kennedy M.D.       XR Chest 1 View [[id number]] Collected: 12/15/22 0937     Updated: 12/15/22 0940    Narrative:       XR CHEST 1 VW-  12/15/2022     HISTORY: Shortness of breath.     There is moderate patchy infiltrate in the right mid to lower lung  consistent with pneumonia. Right apex and left lung appears largely  clear.     Heart size is at the upper limits of normal.       Impression:      1. Moderate pneumonia of the right mid to lower lung.     This report was finalized on 12/15/2022 9:37 AM by Dr. Benjamin Murphy M.D.               Pertinent Labs     Results from last 7 days   Lab Units 12/16/22  0905 12/15/22  0853 12/13/22  1224   WBC 10*3/mm3 14.06* 22.98* 8.87   HEMOGLOBIN g/dL 12.3 12.1 12.7   PLATELETS 10*3/mm3 223 232 252     Results from last 7 days   Lab Units 12/16/22  0905 12/15/22  0853 12/13/22  1224   SODIUM mmol/L 138 139 143   POTASSIUM mmol/L 3.5 4.3 4.4   CHLORIDE mmol/L 103 103 106   TOTAL CO2 mmol/L  --   --  27.2   CO2 mmol/L 27.0 24.2  --    BUN mg/dL 9 11 13   CREATININE mg/dL 0.80 0.83 0.80   GLUCOSE mg/dL 112* 112* 85   EGFR mL/min/1.73 89.9 86.0  --      Results from last 7 days   Lab Units 12/15/22  0853 12/13/22  1224   ALBUMIN g/dL 4.00 4.00   BILIRUBIN mg/dL 0.6 0.4   ALK PHOS U/L 73 82   AST (SGOT) U/L 20 16   ALT (SGPT) U/L 13 9     Results from last 7 days   Lab Units 12/16/22  0905 12/15/22  0853 12/13/22  1224   CALCIUM mg/dL 8.7 9.0 9.7   ALBUMIN g/dL  --  4.00 4.00       Results from last 7 days   Lab Units 12/15/22  0853   TROPONIN T ng/mL <0.010   PROBNP pg/mL 147.0       Results from last 7 days   Lab Units 12/13/22  1224   TRIGLYCERIDES mg/dL 48   HDL CHOL mg/dL 43   LDL CHOL mg/dL 103*     Results from last 7 days   Lab Units 12/15/22  1827 12/15/22  1225 12/15/22  1103   BLOODCX   --  No growth at 24 hours No growth at 24 hours   MRSAPCR  No MRSA Detected  --   --      Results from last 7 days   Lab Units 12/15/22  0854   COVID19  Not Detected       Test Results Pending at Discharge     Pending Labs     Order Current Status    CBC (No Diff) In process    Blood Culture - Blood,  Arm, Left Preliminary result    Blood Culture - Blood, Arm, Left Preliminary result          Discharge Details        Discharge Medications      New Medications      Instructions Start Date   cefdinir 300 MG capsule  Commonly known as: OMNICEF   300 mg, Oral, 2 Times Daily         Continue These Medications      Instructions Start Date   acetaminophen 500 MG tablet  Commonly known as: TYLENOL   500 mg, Oral, As Needed      apixaban 5 MG tablet tablet  Commonly known as: ELIQUIS   5 mg, Oral, 2 Times Daily      ascorbic acid 500 MG tablet  Commonly known as: VITAMIN C   500 mg, Oral, As Needed      busPIRone 15 MG tablet  Commonly known as: BUSPAR   15 mg, Oral, 2 Times Daily      flecainide 50 MG tablet  Commonly known as: TAMBOCOR   50 mg, Oral, Every 12 Hours      hydroCHLOROthiazide 25 MG tablet  Commonly known as: HYDRODIURIL   25 mg, Oral, Daily      metoprolol succinate XL 50 MG 24 hr tablet  Commonly known as: TOPROL-XL   Take 1 tablet by mouth once daily      multivitamin tablet tablet   1 tablet, Oral, As Needed             No Known Allergies    Discharge Disposition:  Home or Self Care      Discharge Diet:  Diet Order   Procedures   • Diet: Regular/House Diet; Texture: Regular Texture (IDDSI 7); Fluid Consistency: Thin (IDDSI 0)       Discharge Activity:   Activity Instructions     Activity as Tolerated            CODE STATUS:    Code Status and Medical Interventions:   Ordered at: 12/16/22 0840     Level Of Support Discussed With:    Patient     Code Status (Patient has no pulse and is not breathing):    CPR (Attempt to Resuscitate)     Medical Interventions (Patient has pulse or is breathing):    Full Support       Future Appointments   Date Time Provider Department Center   5/15/2023 10:00 AM Yousuf Dhillon MD [facility]     Additional Instructions for the Follow-ups that You Need to Schedule     Discharge Follow-up with PCP   As directed       Currently Documented PCP:    Yousuf Dhillon,  MD    PCP Phone Number:    [phone]     Follow Up Details: 2-3 weeks            Follow-up Information     Yousuf Dhillon MD .    Specialty: Internal Medicine  Contact information:  [facility] [place] [id number] [phone]             Jarrod Weldon MD. Schedule an appointment as soon as possible for a visit.    Specialties: Pulmonary Disease, Sleep Medicine  Contact information:  [address] [place] [address]  [phone]             Yousuf Dhillon MD .    Specialty: Internal Medicine  Why: 2-3 weeks  Contact information:  [address] [place] [id number] [phone]                         Additional Instructions for the Follow-ups that You Need to Schedule     Discharge Follow-up with PCP   As directed       Currently Documented PCP:    Yousuf Dhillon MD    PCP Phone Number:    [phone]     Follow Up Details: 2-3 weeks           Time Spent on Discharge:  Greater than 30 minutes      Charles Sanabria MD  [facility]  12/17/22  08:25 EST              Electronically signed by Charles Sanabria MD at 12/17/22 3827

## 2022-12-19 ENCOUNTER — TRANSITIONAL CARE MANAGEMENT TELEPHONE ENCOUNTER (OUTPATIENT)
Dept: CALL CENTER | Facility: HOSPITAL | Age: 52
End: 2022-12-19

## 2022-12-19 NOTE — OUTREACH NOTE
Call Center TCM Note    Flowsheet Row Responses   [facility] patient discharged from? [place]   Does the patient have one of the following disease processes/diagnoses(primary or secondary)? Sepsis   TCM attempt successful? No   Unsuccessful attempts Attempt 1          Staci Silva RN    12/19/2022, 13:30 EST

## 2022-12-19 NOTE — OUTREACH NOTE
Call Center TCM Note    Flowsheet Row Responses   [facility] patient discharged from? [place]   Does the patient have one of the following disease processes/diagnoses(primary or secondary)? Sepsis   TCM attempt successful? Yes   Call start time 1601   Call end time 1603   Discharge diagnosis Pneumonia- sepsis   Meds reviewed with patient/caregiver? Yes   Is the patient having any side effects they believe may be caused by any medication additions or changes? No   Does the patient have all medications related to this admission filled (includes all antibiotics, inhalers, nebulizers,steroids,etc.) Yes   Is the patient taking all medications as directed (includes completed medication regime)? Yes   Does the patient have an appointment with their PCP within 7 days of discharge? No   Has home health visited the patient within 72 hours of discharge? N/A   Psychosocial issues? No   Did the patient receive a copy of their discharge instructions? Yes   What is the patient's perception of their health status since discharge? Improving   Nursing interventions Nurse provided patient education   Is the patient/caregiver able to teach back TIME? T emperature - higher or lower than normal, M ental Decline - confused, sleepy, difficult to arouse, I nfection - may have signs and symptoms of an infection, E xtremely Ill - severe pain, discomfort, shortness of breath   Nursing interventions Nurse provided patient education   Is patient/caregiver able to teach back steps to recovery at home? Set small, achievable goals for return to baseline health, Rest and regain strength   Is the patient/caregiver able to teach back signs and symptoms of worsening condition: Fever   Is the patient/caregiver able to teach back the hierarchy of who to call/visit for symptoms/problems? PCP, Specialist, Home health nurse, Urgent Care, ED, 911 Yes   TCM call completed? Yes   Wrap up additional comments Doing well, no questions, says she will f/u  with PCP after the holidays.   Call end time 1603   Would this patient benefit from a Referral to [facility] Social Work? No   Is the patient interested in additional calls from an ambulatory ?  NOTE:  applies to high risk patients requiring additional follow-up. No          Staci Silva RN    12/19/2022, 16:03 EST

## 2022-12-19 NOTE — CASE MANAGEMENT/SOCIAL WORK
Case Management Discharge Note      Final Note: home no needs         Selected Continued Care - Discharged on 12/17/2022 Admission date: 12/15/2022 - Discharge disposition: Home or Self Care    Destination    No services have been selected for the patient.              Durable Medical Equipment    No services have been selected for the patient.              Dialysis/Infusion    No services have been selected for the patient.              Home Medical Care    No services have been selected for the patient.              Therapy    No services have been selected for the patient.              Community Resources    No services have been selected for the patient.              Community & DME    No services have been selected for the patient.                  Transportation Services  Private: Car    Final Discharge Disposition Code: 01 - home or self-care

## 2022-12-20 LAB
BACTERIA SPEC AEROBE CULT: NORMAL
BACTERIA SPEC AEROBE CULT: NORMAL

## 2022-12-27 ENCOUNTER — READMISSION MANAGEMENT (OUTPATIENT)
Dept: CALL CENTER | Facility: HOSPITAL | Age: 52
End: 2022-12-27

## 2022-12-27 NOTE — OUTREACH NOTE
"Sepsis Week 2 Survey    Flowsheet Row Responses   [facility] patient discharged from? [place]   Does the patient have one of the following disease processes/diagnoses(primary or secondary)? Sepsis   Week 2 attempt successful? Yes   Call start time 1617   Call end time 1619   Discharge diagnosis Pneumonia- sepsis   Is patient permission given to speak with other caregiver? Yes   List who call center can speak with Mother    Person spoke with today (if not patient) and relationship Mother   Meds reviewed with patient/caregiver? Yes   Is the patient taking all medications as directed (includes completed medication regime)? Yes   Does the patient have a primary care provider?  Yes   Comments regarding PCP mother unsure if pt has had apt    Has home health visited the patient within 72 hours of discharge? N/A   What is the patient's perception of their health status since discharge? Improving   Is the patient/caregiver able to teach back signs and symptoms of worsening condition: Rapid heart rate (>90), Shortness of breath/rapid respiratory rate, Hyperthermia   If the patient is a current smoker, are they able to teach back resources for cessation? Not a smoker   Is the patient/caregiver able to teach back the hierarchy of who to call/visit for symptoms/problems? PCP, Specialist, Home health nurse, Urgent Care, ED, 911 Yes   Week 2 call completed? Yes   Wrap up additional comments doing well per mother - \"back to her normal self\"           ARGELIA JAIME - Registered Nurse  "

## 2023-05-15 ENCOUNTER — OFFICE VISIT (OUTPATIENT)
Dept: FAMILY MEDICINE CLINIC | Facility: CLINIC | Age: 53
End: 2023-05-15
Payer: COMMERCIAL

## 2023-05-15 VITALS
HEIGHT: 66 IN | SYSTOLIC BLOOD PRESSURE: 118 MMHG | HEART RATE: 47 BPM | OXYGEN SATURATION: 98 % | WEIGHT: 293 LBS | DIASTOLIC BLOOD PRESSURE: 80 MMHG | BODY MASS INDEX: 47.09 KG/M2

## 2023-05-15 DIAGNOSIS — I47.1 PAT (PAROXYSMAL ATRIAL TACHYCARDIA): Primary | ICD-10-CM

## 2023-05-15 DIAGNOSIS — R53.83 OTHER FATIGUE: ICD-10-CM

## 2023-05-15 DIAGNOSIS — I10 PRIMARY HYPERTENSION: ICD-10-CM

## 2023-05-15 PROBLEM — R13.10 DYSPHAGIA: Status: ACTIVE | Noted: 2023-01-25

## 2023-05-15 PROCEDURE — 99214 OFFICE O/P EST MOD 30 MIN: CPT | Performed by: INTERNAL MEDICINE

## 2023-05-15 NOTE — PROGRESS NOTES
05/15/2023    Assessment & Plan      52-year-old presents at this time for follow-up of hypertension.  She was seen about 2 months or so ago by cardiology for follow-up of hypertension and atrial fibrillation.  Note is made she is currently on flecainide.  At that visit her metoprolol was increased to 50 mg p.o. daily.  She relates that over the past 3 to 4 weeks she has had increasing fatigue.  Her blood pressure is noted to be here in the office at 118/80 in the left arm sitting position standard cuff but her pulse is 45 and regular.  I discussed this with the [place] cardiologist who will be assuming her care note is made that Dr. Cordelia Soto has retired.  It was recommended that patient decrease her metoprolol to 25 mg p.o. daily she can do this by breaking her current 50 mg tablet in half.  Note is made the patient is alert and oriented x3 with appropriate affect and judgment.  Her weight is down 6 pounds from last visit.  There are no diagnoses linked to this encounter.         CC: Hypertension (F/U.  Still having issues with discoloration of the tongue. ) and Fatigue (And achy.  Doesn't feel like herself.----no other issues)  .        HPI  History of Present Illness     Subjective   Sarah Frey is a 52 y.o. female.      The following portions of the patient's history were reviewed and updated as appropriate: allergies, current medications, past family history, past medical history, past social history, past surgical history and problem list.    Problem List  Patient Active Problem List   Diagnosis   • Asthma   • Low back pain   • Deep vein thrombosis of left lower extremity   • Obstructive sleep apnea syndrome   • Paroxysmal atrial fibrillation (HCC)   • Major depression, single episode   • Excessive or frequent menstruation   • Polyp of corpus uteri   • Benign essential hypertension   • Mitral valve regurgitation   • Obesity   • Encounter for gynecological examination   • History of deep vein thrombosis  (DVT) of lower extremity   • Allergic rhinitis   • Arthritis   • Personal history of other venous thrombosis and embolism   • Polyphagia   • Viral hepatitis C   • Acute respiratory failure with hypoxia   • Cardiomegaly   • Pneumonia due to COVID-19 virus   • Hypertensive disorder   • Chronic anticoagulation   • Deep venous thrombosis   • Pneumonia   • Sepsis due to pneumonia   • Abnormal liver diagnostic imaging   • Pneumonia of right lung due to infectious organism, unspecified part of lung   • Dysphagia       Past Medical History  Past Medical History:   Diagnosis Date   • Asthma 2/3/2020   • Deep vein thrombosis of left lower extremity 3/19/2018   • Essential hypertension, benign 2011   • Excessive or frequent menstruation 2020   • Low back pain 2019   • Major depressive disorder, single episode, unspecified 2012   • Mitral valve insufficiency 2008   • Obesity, unspecified 2020   • Obstructive sleep apnea (adult) (pediatric) 3/19/2018   • Paroxysmal atrial fibrillation 3/27/2017   • Polyp of corpus uteri 2012       Surgical History  Past Surgical History:   Procedure Laterality Date   • BARIATRIC SURGERY  10/21/2020   •  SECTION  2005   • TONSILLECTOMY  2012   • TUBAL ABDOMINAL LIGATION  10/21/2010       Family History  Family History   Problem Relation Age of Onset   • Anxiety disorder Mother    • Arthritis Mother    • Asthma Mother    • Hyperlipidemia Mother        Social History  Social History    Tobacco Use      Smoking status: Never      Smokeless tobacco: Never       Is the Patient a current tobacco user? No    Allergies  No Known Allergies    Current Medications    Current Outpatient Medications:   •  acetaminophen (TYLENOL) 500 MG tablet, Take 1 tablet by mouth As Needed for Mild Pain., Disp: , Rfl:   •  apixaban (ELIQUIS) 5 MG tablet tablet, Take 1 tablet by mouth 2 (Two) Times a Day., Disp: , Rfl:   •  ascorbic acid (VITAMIN C) 500 MG  tablet, Take 1 tablet by mouth As Needed., Disp: , Rfl:   •  busPIRone (BUSPAR) 15 MG tablet, Take 1 tablet by mouth 2 (Two) Times a Day., Disp: , Rfl:   •  flecainide (TAMBOCOR) 50 MG tablet, Take 1 tablet by mouth Every 12 (Twelve) Hours., Disp: , Rfl:   •  hydroCHLOROthiazide (HYDRODIURIL) 25 MG tablet, Take 1 tablet by mouth Daily., Disp: , Rfl:   •  metoprolol succinate XL (TOPROL-XL) 50 MG 24 hr tablet, Take 1 tablet by mouth once daily (Patient taking differently: Take 1 tablet by mouth Daily.), Disp: 30 tablet, Rfl: 5  •  multivitamin (THERAGRAN) tablet tablet, Take 1 tablet by mouth As Needed., Disp: , Rfl:      Review of System  Review of Systems   Constitutional: Negative for chills and fever.   Respiratory: Negative for cough and shortness of breath.    Cardiovascular: Negative for chest pain and palpitations.   Gastrointestinal: Negative for constipation, diarrhea, nausea and vomiting.   Neurological: Negative for dizziness and headache.     I have reviewed and confirmed the accuracy of the ROS as documented by the MA/LPN/RN Yousuf Dhillon MD    Vitals:    05/15/23 1012   Pulse: (!) 47   SpO2: 98%     Body mass index is 48.74 kg/m².    Objective     Physical Exam  Physical Exam  Constitutional:       General: She is not in acute distress.     Appearance: Normal appearance.   HENT:      Head: Normocephalic and atraumatic.   Cardiovascular:      Rate and Rhythm: Normal rate and regular rhythm.   Pulmonary:      Effort: Pulmonary effort is normal. No respiratory distress.      Breath sounds: Normal breath sounds. No wheezing, rhonchi or rales.   Neurological:      General: No focal deficit present.      Mental Status: She is alert and oriented to person, place, and time.   Psychiatric:         Mood and Affect: Mood normal.         Behavior: Behavior normal.         Thought Content: Thought content normal.         Judgment: Judgment normal.       Plan:  1.)  Decrease metoprolol to 25 mg 1 tab p.o. every  morning.  2.)  We will see the patient in follow-up in 1 week.  3.)  Patient is urged to go to the emergency room if she experiences extreme fatigue.      Yousuf Dhillon MD  05/15/2023

## 2023-05-23 ENCOUNTER — TELEPHONE (OUTPATIENT)
Dept: FAMILY MEDICINE CLINIC | Facility: CLINIC | Age: 53
End: 2023-05-23

## 2023-05-23 ENCOUNTER — HOSPITAL ENCOUNTER (OUTPATIENT)
Facility: HOSPITAL | Age: 53
Setting detail: OBSERVATION
Discharge: HOME | End: 2023-05-25
Attending: EMERGENCY MEDICINE | Admitting: STUDENT IN AN ORGANIZED HEALTH CARE EDUCATION/TRAINING PROGRAM
Payer: COMMERCIAL

## 2023-05-23 ENCOUNTER — HOSPITAL ENCOUNTER (OUTPATIENT)
Dept: GENERAL RADIOLOGY | Facility: HOSPITAL | Age: 53
Discharge: HOME | End: 2023-05-23
Admitting: INTERNAL MEDICINE
Payer: COMMERCIAL

## 2023-05-23 ENCOUNTER — OFFICE VISIT (OUTPATIENT)
Dept: FAMILY MEDICINE CLINIC | Facility: CLINIC | Age: 53
End: 2023-05-23
Payer: COMMERCIAL

## 2023-05-23 VITALS — TEMPERATURE: 99.7 F | WEIGHT: 293 LBS | HEIGHT: 66 IN | BODY MASS INDEX: 47.09 KG/M2

## 2023-05-23 DIAGNOSIS — I10 PRIMARY HYPERTENSION: ICD-10-CM

## 2023-05-23 DIAGNOSIS — D72.829 LEUKOCYTOSIS, UNSPECIFIED TYPE: ICD-10-CM

## 2023-05-23 DIAGNOSIS — I47.1 PAT (PAROXYSMAL ATRIAL TACHYCARDIA): ICD-10-CM

## 2023-05-23 DIAGNOSIS — R05.1 ACUTE COUGH: ICD-10-CM

## 2023-05-23 DIAGNOSIS — T17.908A ASPIRATION INTO AIRWAY, INITIAL ENCOUNTER: ICD-10-CM

## 2023-05-23 DIAGNOSIS — J18.9 PNEUMONIA OF RIGHT LUNG DUE TO INFECTIOUS ORGANISM, UNSPECIFIED PART OF LUNG: Primary | ICD-10-CM

## 2023-05-23 DIAGNOSIS — R05.1 ACUTE COUGH: Primary | ICD-10-CM

## 2023-05-23 LAB
ALBUMIN SERPL-MCNC: 3.8 G/DL (ref 3.5–5.2)
ALBUMIN/GLOB SERPL: 1.3 G/DL
ALP SERPL-CCNC: 73 U/L (ref 39–117)
ALT SERPL W P-5'-P-CCNC: 14 U/L (ref 1–33)
ANION GAP SERPL CALCULATED.3IONS-SCNC: 10.1 MMOL/L (ref 5–15)
AST SERPL-CCNC: 18 U/L (ref 1–32)
B PARAPERT DNA SPEC QL NAA+PROBE: NOT DETECTED
B PERT DNA SPEC QL NAA+PROBE: NOT DETECTED
BASOPHILS # BLD AUTO: 0.04 10*3/MM3 (ref 0–0.2)
BASOPHILS NFR BLD AUTO: 0.2 % (ref 0–1.5)
BILIRUB SERPL-MCNC: 0.7 MG/DL (ref 0–1.2)
BUN SERPL-MCNC: 14 MG/DL (ref 6–20)
BUN/CREAT SERPL: 16.5 (ref 7–25)
C PNEUM DNA NPH QL NAA+NON-PROBE: NOT DETECTED
CALCIUM SPEC-SCNC: 9.6 MG/DL (ref 8.6–10.5)
CHLORIDE SERPL-SCNC: 105 MMOL/L (ref 98–107)
CO2 SERPL-SCNC: 27.9 MMOL/L (ref 22–29)
CREAT SERPL-MCNC: 0.85 MG/DL (ref 0.57–1)
D-LACTATE SERPL-SCNC: 1 MMOL/L (ref 0.5–2)
DEPRECATED RDW RBC AUTO: 43.4 FL (ref 37–54)
EGFRCR SERPLBLD CKD-EPI 2021: 83.6 ML/MIN/1.73
EOSINOPHIL # BLD AUTO: 0.16 10*3/MM3 (ref 0–0.4)
EOSINOPHIL NFR BLD AUTO: 0.9 % (ref 0.3–6.2)
ERYTHROCYTE [DISTWIDTH] IN BLOOD BY AUTOMATED COUNT: 13 % (ref 12.3–15.4)
FLUAV SUBTYP SPEC NAA+PROBE: NOT DETECTED
FLUBV RNA ISLT QL NAA+PROBE: NOT DETECTED
GLOBULIN UR ELPH-MCNC: 3 GM/DL
GLUCOSE SERPL-MCNC: 82 MG/DL (ref 65–99)
HADV DNA SPEC NAA+PROBE: NOT DETECTED
HCOV 229E RNA SPEC QL NAA+PROBE: NOT DETECTED
HCOV HKU1 RNA SPEC QL NAA+PROBE: NOT DETECTED
HCOV NL63 RNA SPEC QL NAA+PROBE: NOT DETECTED
HCOV OC43 RNA SPEC QL NAA+PROBE: NOT DETECTED
HCT VFR BLD AUTO: 36.5 % (ref 34–46.6)
HGB BLD-MCNC: 12.1 G/DL (ref 12–15.9)
HMPV RNA NPH QL NAA+NON-PROBE: NOT DETECTED
HPIV1 RNA ISLT QL NAA+PROBE: NOT DETECTED
HPIV2 RNA SPEC QL NAA+PROBE: NOT DETECTED
HPIV3 RNA NPH QL NAA+PROBE: NOT DETECTED
HPIV4 P GENE NPH QL NAA+PROBE: NOT DETECTED
IMM GRANULOCYTES # BLD AUTO: 0.08 10*3/MM3 (ref 0–0.05)
IMM GRANULOCYTES NFR BLD AUTO: 0.5 % (ref 0–0.5)
LYMPHOCYTES # BLD AUTO: 3.12 10*3/MM3 (ref 0.7–3.1)
LYMPHOCYTES NFR BLD AUTO: 17.6 % (ref 19.6–45.3)
M PNEUMO IGG SER IA-ACNC: NOT DETECTED
MAGNESIUM SERPL-MCNC: 1.8 MG/DL (ref 1.6–2.6)
MCH RBC QN AUTO: 30.2 PG (ref 26.6–33)
MCHC RBC AUTO-ENTMCNC: 33.2 G/DL (ref 31.5–35.7)
MCV RBC AUTO: 91 FL (ref 79–97)
MONOCYTES # BLD AUTO: 1.12 10*3/MM3 (ref 0.1–0.9)
MONOCYTES NFR BLD AUTO: 6.3 % (ref 5–12)
NEUTROPHILS NFR BLD AUTO: 13.22 10*3/MM3 (ref 1.7–7)
NEUTROPHILS NFR BLD AUTO: 74.5 % (ref 42.7–76)
NRBC BLD AUTO-RTO: 0 /100 WBC (ref 0–0.2)
NT-PROBNP SERPL-MCNC: 171 PG/ML (ref 0–900)
PLATELET # BLD AUTO: 203 10*3/MM3 (ref 140–450)
PMV BLD AUTO: 11 FL (ref 6–12)
POTASSIUM SERPL-SCNC: 3.7 MMOL/L (ref 3.5–5.2)
PROCALCITONIN SERPL-MCNC: 0.19 NG/ML (ref 0–0.25)
PROT SERPL-MCNC: 6.8 G/DL (ref 6–8.5)
RBC # BLD AUTO: 4.01 10*6/MM3 (ref 3.77–5.28)
RHINOVIRUS RNA SPEC NAA+PROBE: NOT DETECTED
RSV RNA NPH QL NAA+NON-PROBE: NOT DETECTED
SARS-COV-2 RNA NPH QL NAA+NON-PROBE: NOT DETECTED
SODIUM SERPL-SCNC: 143 MMOL/L (ref 136–145)
TROPONIN T SERPL HS-MCNC: 6 NG/L
WBC NRBC COR # BLD: 17.74 10*3/MM3 (ref 3.4–10.8)

## 2023-05-23 PROCEDURE — 96365 THER/PROPH/DIAG IV INF INIT: CPT

## 2023-05-23 PROCEDURE — 96368 THER/DIAG CONCURRENT INF: CPT

## 2023-05-23 PROCEDURE — 80053 COMPREHEN METABOLIC PANEL: CPT | Performed by: EMERGENCY MEDICINE

## 2023-05-23 PROCEDURE — 84145 PROCALCITONIN (PCT): CPT | Performed by: EMERGENCY MEDICINE

## 2023-05-23 PROCEDURE — 36415 COLL VENOUS BLD VENIPUNCTURE: CPT

## 2023-05-23 PROCEDURE — 84484 ASSAY OF TROPONIN QUANT: CPT | Performed by: EMERGENCY MEDICINE

## 2023-05-23 PROCEDURE — 87040 BLOOD CULTURE FOR BACTERIA: CPT | Performed by: EMERGENCY MEDICINE

## 2023-05-23 PROCEDURE — 83880 ASSAY OF NATRIURETIC PEPTIDE: CPT | Performed by: EMERGENCY MEDICINE

## 2023-05-23 PROCEDURE — 83735 ASSAY OF MAGNESIUM: CPT | Performed by: EMERGENCY MEDICINE

## 2023-05-23 PROCEDURE — 0202U NFCT DS 22 TRGT SARS-COV-2: CPT | Performed by: EMERGENCY MEDICINE

## 2023-05-23 PROCEDURE — 99284 EMERGENCY DEPT VISIT MOD MDM: CPT

## 2023-05-23 PROCEDURE — 25010000002 CEFTRIAXONE PER 250 MG: Performed by: EMERGENCY MEDICINE

## 2023-05-23 PROCEDURE — 25010000002 AZITHROMYCIN PER 500 MG: Performed by: EMERGENCY MEDICINE

## 2023-05-23 PROCEDURE — 93005 ELECTROCARDIOGRAM TRACING: CPT | Performed by: EMERGENCY MEDICINE

## 2023-05-23 PROCEDURE — 71046 X-RAY EXAM CHEST 2 VIEWS: CPT

## 2023-05-23 PROCEDURE — 83605 ASSAY OF LACTIC ACID: CPT | Performed by: EMERGENCY MEDICINE

## 2023-05-23 PROCEDURE — 85025 COMPLETE CBC W/AUTO DIFF WBC: CPT | Performed by: EMERGENCY MEDICINE

## 2023-05-23 RX ORDER — SODIUM CHLORIDE 0.9 % (FLUSH) 0.9 %
10 SYRINGE (ML) INJECTION AS NEEDED
Status: DISCONTINUED | OUTPATIENT
Start: 2023-05-23 | End: 2023-05-25 | Stop reason: HOSPADM

## 2023-05-23 RX ADMIN — CEFTRIAXONE SODIUM 1 G: 1 INJECTION, POWDER, FOR SOLUTION INTRAMUSCULAR; INTRAVENOUS at 22:11

## 2023-05-23 RX ADMIN — AZITHROMYCIN MONOHYDRATE 500 MG: 500 INJECTION, POWDER, LYOPHILIZED, FOR SOLUTION INTRAVENOUS at 22:11

## 2023-05-23 NOTE — ED TRIAGE NOTES
"Patient presents ambulatory to ED at the request of Dr Dhillon after having an abnormal chest xray today showing RLL pneumonia. Patient states she was diagnosed with aspiration pneumonia after \"choking in my sleep I woke up coughing\". Patient reports fevers at home(TMAX 101 - did not take anything for fever at home), pain with breathing, cough, shortness of air and body aches.   "

## 2023-05-23 NOTE — TELEPHONE ENCOUNTER
I discussed with the patient that her chest x-ray shows aspiration pneumonia as I originally thought in the office.  I urged her to go to the emergency room for evaluation and further treatment.

## 2023-05-23 NOTE — PROGRESS NOTES
05/23/2023    Assessment & Plan      This 52-year-old patient presents today for follow-up of hypertension and bradycardia.  She relates she is feeling significantly improved from her last visit several days ago.  Her metoprolol was decreased at that visit to 25 mg.  Her blood pressure today is 120/80 in the left arm sitting position large cuff her pulse is 78 and regular while last week was down to 47.    Patient also relates that this morning she started vomiting episodes and had some shortness of breath.  She relates she had some wheezing initially but none now.  Note is made she has had Lap-Band surgery in the past and was hospitalized in December with aspiration pneumonia.  She ambulates without difficulty now and does not admit to any shortness of breath.  We note her temperature elevation of 99.7.  Patient refuses emergency room visit at this time citing the need to attend to some of her sons graduation events.    I have tried to encourage ER evaluation at this time however patient relates that she just cannot do this.  We will get a chest x-ray PA and lateral and discuss with the patient.        Diagnoses and all orders for this visit:    1. Acute cough (Primary)  -     XR Chest PA & Lateral; Future             CC: Hypertension (Follow up on med change.) and Pneumonia (Symptoms started early this morning.  Food that was eaten last night got stuck in her lap-band.  She then aspirated it and it went into her lungs.  Vomiting all night and feverish----no other issues)  .        HPI  History of Present Illness  This 52-year-old patient presents today for follow-up of hypertension and bradycardia.  She was seen approximately week or so ago with bradycardia necessitating discussion with her cardiologist.  It was decided to decrease her metoprolol to improve her bradycardia.       Subjective   Sarah Frey is a 52 y.o. female.      The following portions of the patient's history were reviewed and updated as  appropriate: allergies, current medications, past family history, past medical history, past social history, past surgical history and problem list.    Problem List  Patient Active Problem List   Diagnosis   • Asthma   • Low back pain   • Deep vein thrombosis of left lower extremity   • Obstructive sleep apnea syndrome   • Paroxysmal atrial fibrillation (HCC)   • Major depression, single episode   • Excessive or frequent menstruation   • Polyp of corpus uteri   • Benign essential hypertension   • Mitral valve regurgitation   • Obesity   • Encounter for gynecological examination   • History of deep vein thrombosis (DVT) of lower extremity   • Allergic rhinitis   • Arthritis   • Personal history of other venous thrombosis and embolism   • Polyphagia   • Viral hepatitis C   • Acute respiratory failure with hypoxia   • Cardiomegaly   • Pneumonia due to COVID-19 virus   • Hypertensive disorder   • Chronic anticoagulation   • Deep venous thrombosis   • Pneumonia   • Sepsis due to pneumonia   • Abnormal liver diagnostic imaging   • Pneumonia of right lung due to infectious organism, unspecified part of lung   • Dysphagia       Past Medical History  Past Medical History:   Diagnosis Date   • Asthma 2/3/2020   • Deep vein thrombosis of left lower extremity 3/19/2018   • Essential hypertension, benign 2011   • Excessive or frequent menstruation 2020   • Low back pain 2019   • Major depressive disorder, single episode, unspecified 2012   • Mitral valve insufficiency 2008   • Obesity, unspecified 2020   • Obstructive sleep apnea (adult) (pediatric) 3/19/2018   • Paroxysmal atrial fibrillation 3/27/2017   • Polyp of corpus uteri 2012       Surgical History  Past Surgical History:   Procedure Laterality Date   • BARIATRIC SURGERY  10/21/2020   •  SECTION  2005   • TONSILLECTOMY  2012   • TUBAL ABDOMINAL LIGATION  10/21/2010       Family History  Family History    Problem Relation Age of Onset   • Anxiety disorder Mother    • Arthritis Mother    • Asthma Mother    • Hyperlipidemia Mother        Social History  Social History    Tobacco Use      Smoking status: Never      Smokeless tobacco: Never       Is the Patient a current tobacco user? No    Allergies  No Known Allergies    Current Medications    Current Outpatient Medications:   •  acetaminophen (TYLENOL) 500 MG tablet, Take 1 tablet by mouth As Needed for Mild Pain., Disp: , Rfl:   •  apixaban (ELIQUIS) 5 MG tablet tablet, Take 1 tablet by mouth 2 (Two) Times a Day., Disp: , Rfl:   •  ascorbic acid (VITAMIN C) 500 MG tablet, Take 1 tablet by mouth As Needed., Disp: , Rfl:   •  busPIRone (BUSPAR) 15 MG tablet, Take 1 tablet by mouth 2 (Two) Times a Day., Disp: , Rfl:   •  flecainide (TAMBOCOR) 50 MG tablet, Take 1 tablet by mouth Every 12 (Twelve) Hours., Disp: , Rfl:   •  hydroCHLOROthiazide (HYDRODIURIL) 25 MG tablet, Take 1 tablet by mouth Daily., Disp: , Rfl:   •  metoprolol succinate XL (TOPROL-XL) 50 MG 24 hr tablet, Take 1 tablet by mouth once daily (Patient taking differently: Take 25 mg by mouth Daily.), Disp: 30 tablet, Rfl: 5  •  multivitamin (THERAGRAN) tablet tablet, Take 1 tablet by mouth As Needed., Disp: , Rfl:      Review of System  Review of Systems   Constitutional: Positive for fever. Negative for chills.   HENT: Positive for trouble swallowing.    Respiratory: Negative for cough and shortness of breath.    Cardiovascular: Negative for chest pain and palpitations.   Gastrointestinal: Negative for constipation, diarrhea, nausea and vomiting.   Neurological: Negative for dizziness and headache.     I have reviewed and confirmed the accuracy of the ROS as documented by the MA/LPN/RN Yousuf Dhillon MD    Vitals:    05/23/23 0907   Temp: 99.7 °F (37.6 °C)     Body mass index is 49.39 kg/m².    Objective     Physical Exam  Physical Exam  Constitutional:       General: She is not in acute distress.      Appearance: Normal appearance.   HENT:      Head: Normocephalic and atraumatic.   Cardiovascular:      Rate and Rhythm: Normal rate and regular rhythm.   Pulmonary:      Effort: Pulmonary effort is normal. No respiratory distress.      Breath sounds: Normal breath sounds. No wheezing, rhonchi or rales.   Neurological:      General: No focal deficit present.      Mental Status: She is alert and oriented to person, place, and time.   Psychiatric:         Mood and Affect: Mood normal.         Behavior: Behavior normal.         Thought Content: Thought content normal.         Judgment: Judgment normal.             Yousuf Dhillon MD  05/23/2023

## 2023-05-24 PROBLEM — Z98.84 HISTORY OF LAPAROSCOPIC ADJUSTABLE GASTRIC BANDING: Status: ACTIVE | Noted: 2023-05-24

## 2023-05-24 LAB
ANION GAP SERPL CALCULATED.3IONS-SCNC: 9.2 MMOL/L (ref 5–15)
BASOPHILS # BLD AUTO: 0.04 10*3/MM3 (ref 0–0.2)
BASOPHILS NFR BLD AUTO: 0.3 % (ref 0–1.5)
BUN SERPL-MCNC: 11 MG/DL (ref 6–20)
BUN/CREAT SERPL: 13.3 (ref 7–25)
CALCIUM SPEC-SCNC: 9 MG/DL (ref 8.6–10.5)
CHLORIDE SERPL-SCNC: 107 MMOL/L (ref 98–107)
CO2 SERPL-SCNC: 25.8 MMOL/L (ref 22–29)
CREAT SERPL-MCNC: 0.83 MG/DL (ref 0.57–1)
DEPRECATED RDW RBC AUTO: 42.9 FL (ref 37–54)
EGFRCR SERPLBLD CKD-EPI 2021: 86 ML/MIN/1.73
EOSINOPHIL # BLD AUTO: 0.23 10*3/MM3 (ref 0–0.4)
EOSINOPHIL NFR BLD AUTO: 1.8 % (ref 0.3–6.2)
ERYTHROCYTE [DISTWIDTH] IN BLOOD BY AUTOMATED COUNT: 12.9 % (ref 12.3–15.4)
GEN 5 2HR TROPONIN T REFLEX: 12 NG/L
GLUCOSE SERPL-MCNC: 87 MG/DL (ref 65–99)
HCT VFR BLD AUTO: 32.6 % (ref 34–46.6)
HGB BLD-MCNC: 10.9 G/DL (ref 12–15.9)
IMM GRANULOCYTES # BLD AUTO: 0.04 10*3/MM3 (ref 0–0.05)
IMM GRANULOCYTES NFR BLD AUTO: 0.3 % (ref 0–0.5)
L PNEUMO1 AG UR QL IA: NEGATIVE
LYMPHOCYTES # BLD AUTO: 2.52 10*3/MM3 (ref 0.7–3.1)
LYMPHOCYTES NFR BLD AUTO: 19.2 % (ref 19.6–45.3)
MCH RBC QN AUTO: 30.2 PG (ref 26.6–33)
MCHC RBC AUTO-ENTMCNC: 33.4 G/DL (ref 31.5–35.7)
MCV RBC AUTO: 90.3 FL (ref 79–97)
MONOCYTES # BLD AUTO: 1.07 10*3/MM3 (ref 0.1–0.9)
MONOCYTES NFR BLD AUTO: 8.2 % (ref 5–12)
NEUTROPHILS NFR BLD AUTO: 70.2 % (ref 42.7–76)
NEUTROPHILS NFR BLD AUTO: 9.2 10*3/MM3 (ref 1.7–7)
NRBC BLD AUTO-RTO: 0 /100 WBC (ref 0–0.2)
PLATELET # BLD AUTO: 179 10*3/MM3 (ref 140–450)
PMV BLD AUTO: 10.6 FL (ref 6–12)
POTASSIUM SERPL-SCNC: 3.5 MMOL/L (ref 3.5–5.2)
POTASSIUM SERPL-SCNC: 4 MMOL/L (ref 3.5–5.2)
QT INTERVAL: 463 MS
RBC # BLD AUTO: 3.61 10*6/MM3 (ref 3.77–5.28)
S PNEUM AG SPEC QL LA: NEGATIVE
SODIUM SERPL-SCNC: 142 MMOL/L (ref 136–145)
TROPONIN T DELTA: 6 NG/L
TROPONIN T SERPL HS-MCNC: <6 NG/L
WBC NRBC COR # BLD: 13.1 10*3/MM3 (ref 3.4–10.8)

## 2023-05-24 PROCEDURE — 84484 ASSAY OF TROPONIN QUANT: CPT | Performed by: STUDENT IN AN ORGANIZED HEALTH CARE EDUCATION/TRAINING PROGRAM

## 2023-05-24 PROCEDURE — 25010000002 PIPERACILLIN SOD-TAZOBACTAM PER 1 G: Performed by: NURSE PRACTITIONER

## 2023-05-24 PROCEDURE — G0378 HOSPITAL OBSERVATION PER HR: HCPCS

## 2023-05-24 PROCEDURE — 87899 AGENT NOS ASSAY W/OPTIC: CPT | Performed by: NURSE PRACTITIONER

## 2023-05-24 PROCEDURE — 85025 COMPLETE CBC W/AUTO DIFF WBC: CPT | Performed by: NURSE PRACTITIONER

## 2023-05-24 PROCEDURE — 36415 COLL VENOUS BLD VENIPUNCTURE: CPT | Performed by: NURSE PRACTITIONER

## 2023-05-24 PROCEDURE — 87449 NOS EACH ORGANISM AG IA: CPT | Performed by: NURSE PRACTITIONER

## 2023-05-24 PROCEDURE — 96366 THER/PROPH/DIAG IV INF ADDON: CPT

## 2023-05-24 PROCEDURE — 84132 ASSAY OF SERUM POTASSIUM: CPT | Performed by: STUDENT IN AN ORGANIZED HEALTH CARE EDUCATION/TRAINING PROGRAM

## 2023-05-24 PROCEDURE — 92610 EVALUATE SWALLOWING FUNCTION: CPT

## 2023-05-24 PROCEDURE — 80048 BASIC METABOLIC PNL TOTAL CA: CPT | Performed by: NURSE PRACTITIONER

## 2023-05-24 RX ORDER — ONDANSETRON 2 MG/ML
4 INJECTION INTRAMUSCULAR; INTRAVENOUS EVERY 6 HOURS PRN
Status: DISCONTINUED | OUTPATIENT
Start: 2023-05-24 | End: 2023-05-25 | Stop reason: HOSPADM

## 2023-05-24 RX ORDER — METOPROLOL SUCCINATE 25 MG/1
25 TABLET, EXTENDED RELEASE ORAL DAILY
Status: DISCONTINUED | OUTPATIENT
Start: 2023-05-24 | End: 2023-05-25 | Stop reason: HOSPADM

## 2023-05-24 RX ORDER — BISACODYL 5 MG/1
5 TABLET, DELAYED RELEASE ORAL DAILY PRN
Status: DISCONTINUED | OUTPATIENT
Start: 2023-05-23 | End: 2023-05-25 | Stop reason: HOSPADM

## 2023-05-24 RX ORDER — SODIUM CHLORIDE 0.9 % (FLUSH) 0.9 %
10 SYRINGE (ML) INJECTION AS NEEDED
Status: DISCONTINUED | OUTPATIENT
Start: 2023-05-23 | End: 2023-05-25 | Stop reason: HOSPADM

## 2023-05-24 RX ORDER — POLYETHYLENE GLYCOL 3350 17 G/17G
17 POWDER, FOR SOLUTION ORAL DAILY PRN
Status: DISCONTINUED | OUTPATIENT
Start: 2023-05-23 | End: 2023-05-25 | Stop reason: HOSPADM

## 2023-05-24 RX ORDER — IPRATROPIUM BROMIDE AND ALBUTEROL SULFATE 2.5; .5 MG/3ML; MG/3ML
3 SOLUTION RESPIRATORY (INHALATION) EVERY 4 HOURS PRN
Status: DISCONTINUED | OUTPATIENT
Start: 2023-05-24 | End: 2023-05-25 | Stop reason: HOSPADM

## 2023-05-24 RX ORDER — BUSPIRONE HYDROCHLORIDE 15 MG/1
15 TABLET ORAL 2 TIMES DAILY
Status: DISCONTINUED | OUTPATIENT
Start: 2023-05-24 | End: 2023-05-25 | Stop reason: HOSPADM

## 2023-05-24 RX ORDER — POTASSIUM CHLORIDE 750 MG/1
40 TABLET, FILM COATED, EXTENDED RELEASE ORAL EVERY 4 HOURS
Status: COMPLETED | OUTPATIENT
Start: 2023-05-24 | End: 2023-05-24

## 2023-05-24 RX ORDER — HYDROCHLOROTHIAZIDE 25 MG/1
25 TABLET ORAL DAILY
Status: DISCONTINUED | OUTPATIENT
Start: 2023-05-24 | End: 2023-05-25 | Stop reason: HOSPADM

## 2023-05-24 RX ORDER — AMOXICILLIN 250 MG
2 CAPSULE ORAL 2 TIMES DAILY
Status: DISCONTINUED | OUTPATIENT
Start: 2023-05-24 | End: 2023-05-25 | Stop reason: HOSPADM

## 2023-05-24 RX ORDER — FLECAINIDE ACETATE 50 MG/1
50 TABLET ORAL EVERY 12 HOURS
Status: DISCONTINUED | OUTPATIENT
Start: 2023-05-24 | End: 2023-05-25 | Stop reason: HOSPADM

## 2023-05-24 RX ORDER — SODIUM CHLORIDE 9 MG/ML
40 INJECTION, SOLUTION INTRAVENOUS AS NEEDED
Status: DISCONTINUED | OUTPATIENT
Start: 2023-05-23 | End: 2023-05-25 | Stop reason: HOSPADM

## 2023-05-24 RX ORDER — SODIUM CHLORIDE 0.9 % (FLUSH) 0.9 %
10 SYRINGE (ML) INJECTION EVERY 12 HOURS SCHEDULED
Status: DISCONTINUED | OUTPATIENT
Start: 2023-05-24 | End: 2023-05-25 | Stop reason: HOSPADM

## 2023-05-24 RX ORDER — ONDANSETRON 4 MG/1
4 TABLET, FILM COATED ORAL EVERY 6 HOURS PRN
Status: DISCONTINUED | OUTPATIENT
Start: 2023-05-24 | End: 2023-05-25 | Stop reason: HOSPADM

## 2023-05-24 RX ORDER — BISACODYL 10 MG
10 SUPPOSITORY, RECTAL RECTAL DAILY PRN
Status: DISCONTINUED | OUTPATIENT
Start: 2023-05-23 | End: 2023-05-25 | Stop reason: HOSPADM

## 2023-05-24 RX ADMIN — APIXABAN 5 MG: 5 TABLET, FILM COATED ORAL at 19:45

## 2023-05-24 RX ADMIN — PIPERACILLIN SODIUM AND TAZOBACTAM SODIUM 4.5 G: 4; .5 INJECTION, SOLUTION INTRAVENOUS at 04:50

## 2023-05-24 RX ADMIN — Medication 10 ML: at 01:14

## 2023-05-24 RX ADMIN — POTASSIUM CHLORIDE 40 MEQ: 750 TABLET, EXTENDED RELEASE ORAL at 16:03

## 2023-05-24 RX ADMIN — BUSPIRONE HYDROCHLORIDE 15 MG: 15 TABLET ORAL at 11:29

## 2023-05-24 RX ADMIN — FLECAINIDE ACETATE TABLET 50 MG: 50 TABLET ORAL at 10:46

## 2023-05-24 RX ADMIN — PIPERACILLIN SODIUM AND TAZOBACTAM SODIUM 4.5 G: 4; .5 INJECTION, SOLUTION INTRAVENOUS at 19:47

## 2023-05-24 RX ADMIN — POTASSIUM CHLORIDE 40 MEQ: 750 TABLET, EXTENDED RELEASE ORAL at 10:53

## 2023-05-24 RX ADMIN — BUSPIRONE HYDROCHLORIDE 15 MG: 15 TABLET ORAL at 19:46

## 2023-05-24 RX ADMIN — APIXABAN 5 MG: 5 TABLET, FILM COATED ORAL at 08:23

## 2023-05-24 RX ADMIN — METOPROLOL SUCCINATE 25 MG: 25 TABLET, EXTENDED RELEASE ORAL at 08:23

## 2023-05-24 RX ADMIN — DOCUSATE SODIUM 50MG AND SENNOSIDES 8.6MG 2 TABLET: 8.6; 5 TABLET, FILM COATED ORAL at 08:23

## 2023-05-24 RX ADMIN — FLECAINIDE ACETATE TABLET 50 MG: 50 TABLET ORAL at 19:46

## 2023-05-24 RX ADMIN — PIPERACILLIN SODIUM AND TAZOBACTAM SODIUM 4.5 G: 4; .5 INJECTION, SOLUTION INTRAVENOUS at 10:46

## 2023-05-24 RX ADMIN — HYDROCHLOROTHIAZIDE 25 MG: 25 TABLET ORAL at 08:23

## 2023-05-24 RX ADMIN — DOCUSATE SODIUM 50MG AND SENNOSIDES 8.6MG 2 TABLET: 8.6; 5 TABLET, FILM COATED ORAL at 19:46

## 2023-05-24 NOTE — PLAN OF CARE
Goal Outcome Evaluation:  Plan of Care Reviewed With: patient           Outcome Evaluation: Swallow evaluated. Pt presented with trials of thin (cups/straw), pureed, soft, mixed, and regular. Mastication and bolus control was functional. Pt showed no overt s/s of aspiration. Laryngeal elevation was adequate with palpation. Discussed w/ pt. Pt reported esophageal complaints when she over eats. Feel pt's pharyngeal swallow is functional. Consider GI consult to assess esophageal complaints. ST to sign off at this time. If silent aspiration is suspected, please order VFSS.

## 2023-05-24 NOTE — ED NOTES
".Nursing report ED to floor  Sarah Frey  52 y.o.  female    HPI :   Chief Complaint   Patient presents with    Abnormal Imaging       Admitting doctor:   Charles Sanabria MD    Admitting diagnosis:   The primary encounter diagnosis was Pneumonia of right lung due to infectious organism, unspecified part of lung. A diagnosis of Leukocytosis, unspecified type was also pertinent to this visit.    Code status:   Current Code Status       Date Active Code Status Order ID Comments User Context       5/24/2023 0002 CPR (Attempt to Resuscitate) [id number]  Jennifer Marie APRN ED        Question Answer    Code Status (Patient has no pulse and is not breathing) CPR (Attempt to Resuscitate)    Medical Interventions (Patient has pulse or is breathing) Full Support                    Allergies:   Patient has no known allergies.    Isolation:   Enhanced Droplet/Contact     Intake and Output  No intake or output data in the 24 hours ending 05/24/23 0008    Weight:       05/23/23 1948   Weight: (!) 139 kg (306 lb)       Most recent vitals:   Vitals:    05/23/23 1948 05/23/23 1957 05/23/23 2131 05/23/23 2331   BP:  139/78 155/100 131/66   BP Location:  Right arm Left arm    Patient Position:  Sitting Lying    Pulse: 73 55 56 56   Resp: 18 18 16    Temp: 99.2 °F (37.3 °C)      TempSrc: Tympanic      SpO2: 95% 100% 100% 100%   Weight: (!) 139 kg (306 lb)      Height: 167.6 cm (66\")          Active LDAs/IV Access:   Lines, Drains & Airways       Active LDAs       Name Placement date Placement time Site Days    Peripheral IV 05/23/23 2100 Right Antecubital 05/23/23  2100  Antecubital  less than 1                    Labs (abnormal labs have a star):   Labs Reviewed   CBC WITH AUTO DIFFERENTIAL - Abnormal; Notable for the following components:       Result Value    WBC 17.74 (*)     Lymphocyte % 17.6 (*)     Neutrophils, Absolute 13.22 (*)     Lymphocytes, Absolute 3.12 (*)     Monocytes, Absolute 1.12 (*)     Immature " "Grans, Absolute 0.08 (*)     All other components within normal limits   RESPIRATORY PANEL PCR W/ COVID-19 (SARS-COV-2) [facility]/SY[facility]IA/SHANTELLE/PAD/COR/MAD/DOC IN-HOUSE, NP SWAB IN UTM/VTP, 3-4 HR TAT - Normal    Narrative:     In the setting of a positive respiratory panel with a viral infection PLUS a negative procalcitonin without other underlying concern for bacterial infection, consider observing off antibiotics or discontinuation of antibiotics and continue supportive care. If the respiratory panel is positive for atypical bacterial infection (Bordetella pertussis, Chlamydophila pneumoniae, or Mycoplasma pneumoniae), consider antibiotic de-escalation to target atypical bacterial infection.   LACTIC ACID, PLASMA - Normal   PROCALCITONIN - Normal    Narrative:     As a Marker for Sepsis (Non-Neonates):    1. <0.5 ng/mL represents a low risk of severe sepsis and/or septic shock.  2. >2 ng/mL represents a high risk of severe sepsis and/or septic shock.    As a Marker for Lower Respiratory Tract Infections that require antibiotic therapy:    PCT on Admission    Antibiotic Therapy       6-12 Hrs later    >0.5                Strongly Recommended  >0.25 - <0.5        Recommended   0.1 - 0.25          Discouraged              Remeasure/reassess PCT  <0.1                Strongly Discouraged     Remeasure/reassess PCT    As 28 day mortality risk marker: \"Change in Procalcitonin Result\" (>80% or <=80%) if Day 0 (or Day 1) and Day 4 values are available. Refer to http://[email or web address]-pct-[email or web address]    Change in PCT <=80%  A decrease of PCT levels below or equal to 80% defines a positive change in PCT test result representing a higher risk for 28-day all-cause mortality of patients diagnosed with severe sepsis for septic shock.    Change in PCT >80%  A decrease of PCT levels of more than 80% defines a negative change in PCT result representing a lower risk for 28-day all-cause mortality of patients diagnosed with severe sepsis or " septic shock.      TROPONIN - Normal    Narrative:     High Sensitive Troponin T Reference Range:  <10.0 ng/L- Negative Female for AMI  <15.0 ng/L- Negative Male for AMI  >=10 - Abnormal Female indicating possible myocardial injury.  >=15 - Abnormal Male indicating possible myocardial injury.   Clinicians would have to utilize clinical acumen, EKG, Troponin, and serial changes to determine if it is an Acute Myocardial Infarction or myocardial injury due to an underlying chronic condition.        BNP (IN-HOUSE) - Normal    Narrative:     Among patients with dyspnea, NT-proBNP is highly sensitive for the detection of acute congestive heart failure. In addition NT-proBNP of <300 pg/ml effectively rules out acute congestive heart failure with 99% negative predictive value.    Results may be falsely decreased if patient taking Biotin.     MAGNESIUM - Normal   BLOOD CULTURE   BLOOD CULTURE   RESPIRATORY CULTURE   LEGIONELLA ANTIGEN, URINE   STREP PNEUMO AG, URINE OR CSF   COMPREHENSIVE METABOLIC PANEL    Narrative:     GFR Normal >60  Chronic Kidney Disease <60  Kidney Failure <15     HIGH SENSITIVITIY TROPONIN T 2HR   BASIC METABOLIC PANEL   CBC WITH AUTO DIFFERENTIAL   CBC AND DIFFERENTIAL    Narrative:     The following orders were created for panel order CBC & Differential.  Procedure                               Abnormality         Status                     ---------                               -----------         ------                     CBC Auto Differential[[id number]]        Abnormal            Final result                 Please view results for these tests on the individual orders.       EKG:   ECG 12 Lead Dyspnea   Preliminary Result   HEART RATE= 55  bpm   RR Interval= 1091  ms   PA Interval= 202  ms   P Horizontal Axis= -1  deg   P Front Axis= 49  deg   QRSD Interval= 111  ms   QT Interval= 463  ms   QRS Axis= 56  deg   T Wave Axis= -9  deg   - BORDERLINE ECG -   Sinus rhythm   Borderline prolonged PA  interval   Borderline repolarization abnormality   Electronically Signed By:    Date and Time of Study: 2023-05-23 20:41:45          Meds given in ED:   Medications   sodium chloride 0.9 % flush 10 mL (has no administration in time range)   sodium chloride 0.9 % flush 10 mL (has no administration in time range)   sodium chloride 0.9 % flush 10 mL (has no administration in time range)   sodium chloride 0.9 % infusion 40 mL (has no administration in time range)   sennosides-docusate (PERICOLACE) 8.6-50 MG per tablet 2 tablet (has no administration in time range)     And   polyethylene glycol (MIRALAX) packet 17 g (has no administration in time range)     And   bisacodyl (DULCOLAX) EC tablet 5 mg (has no administration in time range)     And   bisacodyl (DULCOLAX) suppository 10 mg (has no administration in time range)   Potassium Replacement - Follow Nurse / BPA Driven Protocol (has no administration in time range)   Magnesium Standard Dose Replacement - Follow Nurse / BPA Driven Protocol (has no administration in time range)   Phosphorus Replacement - Follow Nurse / BPA Driven Protocol (has no administration in time range)   Calcium Replacement - Follow Nurse / BPA Driven Protocol (has no administration in time range)   ondansetron (ZOFRAN) tablet 4 mg (has no administration in time range)     Or   ondansetron (ZOFRAN) injection 4 mg (has no administration in time range)   ipratropium-albuterol (DUO-NEB) nebulizer solution 3 mL (has no administration in time range)   cefTRIAXone (ROCEPHIN) 1 g in sodium chloride 0.9 % 100 mL IVPB-VTB (has no administration in time range)   cefTRIAXone (ROCEPHIN) 1 g in sodium chloride 0.9 % 100 mL IVPB-VTB (0 g Intravenous Stopped 5/23/23 2330)   azithromycin (ZITHROMAX) 500 mg in sodium chloride 0.9 % 250 mL IVPB-VTB (0 mg Intravenous Stopped 5/23/23 2330)       Imaging results:  XR Chest PA & Lateral    Result Date: 5/23/2023  Pneumonia in the right lower lobe and to a lesser  extent in the right upper lobe and right middle lobe.  This report was finalized on 5/23/2023 3:54 PM by Dr. Tal Pickett M.D.       Ambulatory status:   - stand by    Social issues:   Social History     Socioeconomic History    Marital status:    Tobacco Use    Smoking status: Never    Smokeless tobacco: Never   Substance and Sexual Activity    Alcohol use: Not Currently     Comment: occas    Drug use: Never    Sexual activity: Not Currently     Partners: Male     Birth control/protection: Condom, Abstinence, Tubal ligation       NIH Stroke Scale:         Armida Atkins RN  05/24/23 00:08 EDT

## 2023-05-24 NOTE — ED PROVIDER NOTES
EMERGENCY DEPARTMENT ENCOUNTER    Room Number:  101/1  Date of encounter:  5/24/2023  PCP: Yousuf Dhillon MD  Patient Care Team:  Yousuf Dhillon MD as PCP - General (Internal Medicine)   Independent Historians: Patient    HPI:  Chief Complaint: Dyspnea, cough    A complete HPI/ROS/PMH/PSH/SH/FH are unobtainable due to: None    Chronic or social conditions impacting patient care (Social Determinants of Health): None  (Financial Resource Strain / Food Insecurity / Transportation Needs / Physical Activity / Stress / Social Connections / Intimate Partner Violence / Housing Stability)    Context: Sarah Frey is a 52 y.o. female who presents to the ED c/o acute cough and dyspnea since last night.  States she may have woken up in her sleep and choked a little bit.  Went to see her PMD today and had a chest x-ray ordered.  Patient was then called when her PMD reviewed chest x-ray results told to come to the ER.  Patient reports that she had a fever earlier today at home which has not resolved.  Reports some pain with deep inspiration on the right chest.  No nausea vomiting no abdominal pain.  No chest pain at rest.  Reports that cough is minimally productive of phlegm.    Review of prior external notes (non-ED) -and- Review of prior external test results outside of this encounter: I have reviewed patient's outpatient chest x-ray from earlier today.  I have also reviewed patient's primary care note from today.    Prescription drug monitoring program review:         PAST MEDICAL HISTORY  Active Ambulatory Problems     Diagnosis Date Noted   • Asthma 02/03/2020   • Low back pain 05/06/2019   • Deep vein thrombosis of left lower extremity 03/19/2018   • Obstructive sleep apnea syndrome 03/19/2018   • Paroxysmal atrial fibrillation (HCC) 03/27/2017   • Major depression, single episode 11/05/2012   • Excessive or frequent menstruation 07/24/2020   • Polyp of corpus uteri 09/25/2012   • Benign essential hypertension  2011   • Mitral valve regurgitation 2008   • Obesity 2016   • Encounter for gynecological examination 2019   • History of deep vein thrombosis (DVT) of lower extremity 2020   • Allergic rhinitis 2020   • Arthritis 2020   • Personal history of other venous thrombosis and embolism 2020   • Polyphagia 2020   • Viral hepatitis C 2016   • Acute respiratory failure with hypoxia 2021   • Cardiomegaly 2021   • Pneumonia due to COVID-19 virus 2021   • Hypertensive disorder 2022   • Chronic anticoagulation 2022   • Deep venous thrombosis 2020   • Pneumonia 12/15/2022   • Sepsis due to pneumonia 12/15/2022   • Abnormal liver diagnostic imaging 12/15/2022   • Pneumonia of right lung due to infectious organism, unspecified part of lung 2022   • Dysphagia 2023     Resolved Ambulatory Problems     Diagnosis Date Noted   • No Resolved Ambulatory Problems     Past Medical History:   Diagnosis Date   • Essential hypertension, benign 2011   • Major depressive disorder, single episode, unspecified 2012   • Mitral valve insufficiency 2008   • Obesity, unspecified 2020   • Obstructive sleep apnea (adult) (pediatric) 3/19/2018         PAST SURGICAL HISTORY  Past Surgical History:   Procedure Laterality Date   • BARIATRIC SURGERY  10/21/2020   •  SECTION  2005   • TONSILLECTOMY  2012   • TUBAL ABDOMINAL LIGATION  10/21/2010         FAMILY HISTORY  Family History   Problem Relation Age of Onset   • Anxiety disorder Mother    • Arthritis Mother    • Asthma Mother    • Hyperlipidemia Mother          SOCIAL HISTORY  Social History     Socioeconomic History   • Marital status:    Tobacco Use   • Smoking status: Never   • Smokeless tobacco: Never   Vaping Use   • Vaping Use: Never used   Substance and Sexual Activity   • Alcohol use: Not Currently     Comment: occas   • Drug use:  Never   • Sexual activity: Not Currently     Partners: Male     Birth control/protection: Condom, Abstinence, Tubal ligation         ALLERGIES  Patient has no known allergies.        REVIEW OF SYSTEMS  Review of Systems  Included in HPI  All systems reviewed and negative except for those discussed in HPI.      PHYSICAL EXAM    I have reviewed the triage vital signs and nursing notes.    ED Triage Vitals   Temp Heart Rate Resp BP SpO2   05/23/23 1948 05/23/23 1948 05/23/23 1948 05/23/23 1957 05/23/23 1948   99.2 °F (37.3 °C) 73 18 139/78 95 %      Temp src Heart Rate Source Patient Position BP Location FiO2 (%)   05/23/23 1948 05/23/23 1948 05/23/23 1957 05/23/23 1957 --   Tympanic Monitor Sitting Right arm        Physical Exam  GENERAL: alert, no acute distress  SKIN: Warm, dry  HENT: Normocephalic, atraumatic  EYES: no scleral icterus  CV: regular rhythm, regular rate  RESPIRATORY: normal effort, coarse breath sounds right lung base  ABDOMEN: soft, nontender, nondistended  MUSCULOSKELETAL: no deformity  NEURO: alert, moves all extremities, follows commands                                                               LAB RESULTS  Recent Results (from the past 24 hour(s))   ECG 12 Lead Dyspnea    Collection Time: 05/23/23  8:41 PM   Result Value Ref Range    QT Interval 463 ms   Respiratory Panel PCR w/COVID-19(SARS-CoV-2) [facility]LE/[facility] In-House, NP Swab in UTM/VTM, 3-4 HR TAT - Swab, Nasopharynx    Collection Time: 05/23/23  9:03 PM    Specimen: Nasopharynx; Swab   Result Value Ref Range    ADENOVIRUS, PCR Not Detected Not Detected    Coronavirus 229E Not Detected Not Detected    Coronavirus HKU1 Not Detected Not Detected    Coronavirus NL63 Not Detected Not Detected    Coronavirus OC43 Not Detected Not Detected    COVID19 Not Detected Not Detected - Ref. Range    Human Metapneumovirus Not Detected Not Detected    Human Rhinovirus/Enterovirus Not Detected Not Detected    Influenza A PCR Not Detected  Not Detected    Influenza B PCR Not Detected Not Detected    Parainfluenza Virus 1 Not Detected Not Detected    Parainfluenza Virus 2 Not Detected Not Detected    Parainfluenza Virus 3 Not Detected Not Detected    Parainfluenza Virus 4 Not Detected Not Detected    RSV, PCR Not Detected Not Detected    Bordetella pertussis pcr Not Detected Not Detected    Bordetella parapertussis PCR Not Detected Not Detected    Chlamydophila pneumoniae PCR Not Detected Not Detected    Mycoplasma pneumo by PCR Not Detected Not Detected   Comprehensive Metabolic Panel    Collection Time: 05/23/23  9:04 PM    Specimen: Blood   Result Value Ref Range    Glucose 82 65 - 99 mg/dL    BUN 14 6 - 20 mg/dL    Creatinine 0.85 0.57 - 1.00 mg/dL    Sodium 143 136 - 145 mmol/L    Potassium 3.7 3.5 - 5.2 mmol/L    Chloride 105 98 - 107 mmol/L    CO2 27.9 22.0 - 29.0 mmol/L    Calcium 9.6 8.6 - 10.5 mg/dL    Total Protein 6.8 6.0 - 8.5 g/dL    Albumin 3.8 3.5 - 5.2 g/dL    ALT (SGPT) 14 1 - 33 U/L    AST (SGOT) 18 1 - 32 U/L    Alkaline Phosphatase 73 39 - 117 U/L    Total Bilirubin 0.7 0.0 - 1.2 mg/dL    Globulin 3.0 gm/dL    A/G Ratio 1.3 g/dL    BUN/Creatinine Ratio 16.5 7.0 - 25.0    Anion Gap 10.1 5.0 - 15.0 mmol/L    eGFR 83.6 >60.0 mL/min/1.73   Lactic Acid, Plasma    Collection Time: 05/23/23  9:04 PM    Specimen: Blood   Result Value Ref Range    Lactate 1.0 0.5 - 2.0 mmol/L   Procalcitonin    Collection Time: 05/23/23  9:04 PM    Specimen: Blood   Result Value Ref Range    Procalcitonin 0.19 0.00 - 0.25 ng/mL   High Sensitivity Troponin T    Collection Time: 05/23/23  9:04 PM    Specimen: Blood   Result Value Ref Range    HS Troponin T 6 <10 ng/L   BNP    Collection Time: 05/23/23  9:04 PM    Specimen: Blood   Result Value Ref Range    proBNP 171.0 0.0 - 900.0 pg/mL   Magnesium    Collection Time: 05/23/23  9:04 PM    Specimen: Blood   Result Value Ref Range    Magnesium 1.8 1.6 - 2.6 mg/dL   CBC Auto Differential    Collection Time:  05/23/23  9:04 PM    Specimen: Blood   Result Value Ref Range    WBC 17.74 (H) 3.40 - 10.80 10*3/mm3    RBC 4.01 3.77 - 5.28 10*6/mm3    Hemoglobin 12.1 12.0 - 15.9 g/dL    Hematocrit 36.5 34.0 - 46.6 %    MCV 91.0 79.0 - 97.0 fL    MCH 30.2 26.6 - 33.0 pg    MCHC 33.2 31.5 - 35.7 g/dL    RDW 13.0 12.3 - 15.4 %    RDW-SD 43.4 37.0 - 54.0 fl    MPV 11.0 6.0 - 12.0 fL    Platelets 203 140 - 450 10*3/mm3    Neutrophil % 74.5 42.7 - 76.0 %    Lymphocyte % 17.6 (L) 19.6 - 45.3 %    Monocyte % 6.3 5.0 - 12.0 %    Eosinophil % 0.9 0.3 - 6.2 %    Basophil % 0.2 0.0 - 1.5 %    Immature Grans % 0.5 0.0 - 0.5 %    Neutrophils, Absolute 13.22 (H) 1.70 - 7.00 10*3/mm3    Lymphocytes, Absolute 3.12 (H) 0.70 - 3.10 10*3/mm3    Monocytes, Absolute 1.12 (H) 0.10 - 0.90 10*3/mm3    Eosinophils, Absolute 0.16 0.00 - 0.40 10*3/mm3    Basophils, Absolute 0.04 0.00 - 0.20 10*3/mm3    Immature Grans, Absolute 0.08 (H) 0.00 - 0.05 10*3/mm3    nRBC 0.0 0.0 - 0.2 /100 WBC   High Sensitivity Troponin T 2Hr    Collection Time: 05/23/23 11:31 PM    Specimen: Blood   Result Value Ref Range    HS Troponin T 12 (H) <10 ng/L    Troponin T Delta 6 (C) >=-4 - <+4 ng/L   Legionella Antigen, Urine - Urine, Urine, Clean Catch    Collection Time: 05/24/23 12:42 AM    Specimen: Urine, Clean Catch   Result Value Ref Range    LEGIONELLA ANTIGEN, URINE Negative Negative   S. Pneumo Ag Urine or CSF - Urine, Urine, Clean Catch    Collection Time: 05/24/23 12:42 AM    Specimen: Urine, Clean Catch   Result Value Ref Range    Strep Pneumo Ag Negative Negative       Ordered the above labs and independently reviewed the results.        RADIOLOGY  XR Chest PA & Lateral    Result Date: 5/23/2023  PA AND LATERAL CHEST RADIOGRAPHS  HISTORY: 52-year-old female with a history of possible aspiration.  FINDINGS: PA and lateral chest radiographs were obtained. Comparison is made to prior examinations dated 12/15/2022. There is opacification at the base of the right lower  lobe and to a lesser degree in the right upper lobe and right middle lobe. The cardiomediastinal silhouette is normal in appearance. No bony abnormality of the thorax is appreciated.      Pneumonia in the right lower lobe and to a lesser extent in the right upper lobe and right middle lobe.  This report was finalized on 5/23/2023 3:54 PM by Dr. Tal Pickett M.D.        I ordered the above noted radiological studies. Reviewed by me and discussed with radiologist.  See dictation for official radiology interpretation.      PROCEDURES    Procedures      MEDICATIONS GIVEN IN ER    Medications   sodium chloride 0.9 % flush 10 mL (has no administration in time range)   sodium chloride 0.9 % flush 10 mL (10 mL Intravenous Given 5/24/23 0114)   sodium chloride 0.9 % flush 10 mL (has no administration in time range)   sodium chloride 0.9 % infusion 40 mL (has no administration in time range)   sennosides-docusate (PERICOLACE) 8.6-50 MG per tablet 2 tablet (2 tablets Oral Not Given 5/24/23 0115)     And   polyethylene glycol (MIRALAX) packet 17 g (has no administration in time range)     And   bisacodyl (DULCOLAX) EC tablet 5 mg (has no administration in time range)     And   bisacodyl (DULCOLAX) suppository 10 mg (has no administration in time range)   Potassium Replacement - Follow Nurse / BPA Driven Protocol (has no administration in time range)   Magnesium Standard Dose Replacement - Follow Nurse / BPA Driven Protocol (has no administration in time range)   Phosphorus Replacement - Follow Nurse / BPA Driven Protocol (has no administration in time range)   Calcium Replacement - Follow Nurse / BPA Driven Protocol (has no administration in time range)   ondansetron (ZOFRAN) tablet 4 mg (has no administration in time range)     Or   ondansetron (ZOFRAN) injection 4 mg (has no administration in time range)   ipratropium-albuterol (DUO-NEB) nebulizer solution 3 mL (has no administration in time range)   cefTRIAXone (ROCEPHIN)  1 g in sodium chloride 0.9 % 100 mL IVPB-VTB (has no administration in time range)   cefTRIAXone (ROCEPHIN) 1 g in sodium chloride 0.9 % 100 mL IVPB-VTB (0 g Intravenous Stopped 5/23/23 2330)   azithromycin (ZITHROMAX) 500 mg in sodium chloride 0.9 % 250 mL IVPB-VTB (0 mg Intravenous Stopped 5/23/23 2330)         ORDERS PLACED DURING THIS VISIT:  Orders Placed This Encounter   Procedures   • Blood Culture - Blood,   • Blood Culture - Blood,   • Respiratory Panel PCR w/COVID-19(SARS-CoV-2) [facility]ELLE/[facility]/DOC In-House, NP Swab in UTM/VTM, 3-4 HR TAT - Swab, Nasopharynx   • Respiratory Culture - Sputum, Cough   • Legionella Antigen, Urine - Urine, Urine, Clean Catch   • S. Pneumo Ag Urine or CSF - Urine, Urine, Clean Catch   • Comprehensive Metabolic Panel   • Lactic Acid, Plasma   • Procalcitonin   • High Sensitivity Troponin T   • BNP   • Magnesium   • CBC Auto Differential   • High Sensitivity Troponin T 2Hr   • Blood Gas, Arterial -   • Basic Metabolic Panel   • CBC Auto Differential   • NPO Diet NPO Type: Strict NPO   • Monitor Blood Pressure   • Vital Signs   • Intake & Output   • Weigh Patient   • Saline Lock & Maintain IV Access   • Nursing Dysphagia Screening (Complete Prior to Giving Anything By Mouth)   • RN to Place Order SLP Consult - Eval & Treat Choosing Reason of RN Dysphagia Screen Failed   • Nurse to Call MD or Nutrition Services for Diet if Patient Passes Dysphagia Screen   • Place Sequential Compression Device   • Maintain Sequential Compression Device   • Pulse Oximetry, Continuous   • Up With Assistance   • Daily Weights   • Code Status and Medical Interventions:   • LHA (on-call MD unless specified) Details   • Incentive Spirometry   • Oxygen Therapy- Nasal Cannula; Titrate 1-6 LPM Per SpO2; 90 - 95%   • SLP Consult: Eval & Treat Swallow Disorder   • ECG 12 Lead Dyspnea   • Insert Peripheral IV   • Insert Peripheral IV   • Initiate Observation Status   • CBC & Differential          PROGRESS, DATA ANALYSIS, CONSULTS, AND MEDICAL DECISION MAKING    All labs have been independently interpreted by me.  All radiology studies have been reviewed by me and discussed with radiologist dictating the report.   EKG's independently viewed and interpreted by me.  Discussion below represents my analysis of pertinent findings related to patient's condition, differential diagnosis, treatment plan and final disposition.    My differential diagnosis for dyspnea includes but is not limited to:  Asthma, COPD, pneumonia, pulmonary embolus, acute respiratory distress syndrome, pneumothorax, pleural effusion, pulmonary fibrosis, congestive heart failure, myocardial infarction, DKA, uremia, acidosis, sepsis, anemia, drug related, hyperventilation, CNS disease        ED Course as of 05/24/23 0137   Tue May 23, 2023   2020 Chest x-ray from earlier today shows right upper middle and lower lobe pneumonias. [TJ]   2043 EKG          EKG time: 2041  Rhythm/Rate: Sinus rhythm rate 55  P waves and IA: Borderline prolonged IA  QRS, axis: Narrow regular normal axis  ST and T waves: Nonspecific    Interpreted Contemporaneously by me, independently viewed [TJ]   2121 WBC(!): 17.74 [TJ]   2131 Hemoglobin: 12.1 [TJ]   2131 Platelets: 203 [TJ]   2144 Lactate: 1.0 [TJ]      ED Course User Index  [TJ] Toy Arroyo MD       I interpreted the cardiac monitor rhythm and my independent interpretation is: normal sinus rhythm.     PPE: The patient wore a mask and I wore an N95 mask throughout the entire patient encounter.       AS OF 01:37 EDT VITALS:    BP - 122/52  HR - 66  TEMP - 98.8 °F (37.1 °C) (Oral)  O2 SATS - 100%        DIAGNOSIS  Final diagnoses:   Pneumonia of right lung due to infectious organism, unspecified part of lung   Leukocytosis, unspecified type         DISPOSITION  ED Disposition     ED Disposition   Decision to Admit    Condition   --    Comment   Level of Care: Telemetry [5]   Diagnosis: Pneumonia of  right lung due to infectious organism, unspecified part of lung [[id number]]   Admitting Physician: JOES MIGUEL PATEL [[id number]]                  Note Disclaimer: At [facility], we believe that sharing information builds trust and better relationships. You are receiving this note because you recently visited [facility]. It is possible you will see health information before a provider has talked with you about it. This kind of information can be easy to misunderstand. To help you fully understand what it means for your health, we urge you to discuss this note with your provider.       Toy Arroyo MD  05/24/23 0137

## 2023-05-24 NOTE — PROGRESS NOTES
[facility] Clinical Pharmacy Services: Piperacillin-Tazobactam Consult    Pt Name: Sarah Frey   : 1970    Indication: Pneumonia    Relevant clinical data and objective history reviewed:    Past Medical History:   Diagnosis Date    Asthma 2/3/2020    Deep vein thrombosis of left lower extremity 3/19/2018    Essential hypertension, benign 2011    Excessive or frequent menstruation 2020    Low back pain 2019    Major depressive disorder, single episode, unspecified 2012    Mitral valve insufficiency 2008    Obesity, unspecified 2020    Obstructive sleep apnea (adult) (pediatric) 3/19/2018    Paroxysmal atrial fibrillation 3/27/2017    Polyp of corpus uteri 2012     Creatinine   Date Value Ref Range Status   2023 0.85 0.57 - 1.00 mg/dL Final   2022 0.80 0.57 - 1.00 mg/dL Final   12/15/2022 0.83 0.57 - 1.00 mg/dL Final     BUN   Date Value Ref Range Status   2023 14 6 - 20 mg/dL Final     Estimated Creatinine Clearance: 113.5 mL/min (by C-G formula based on SCr of 0.85 mg/dL).    Lab Results   Component Value Date    WBC 17.74 (H) 2023     Temp Readings from Last 3 Encounters:   23 98.8 °F (37.1 °C) (Oral)   23 99.7 °F (37.6 °C) (Oral)   22 98 °F (36.7 °C) (Oral)      Assessment/Plan  Estimated CrCl >20 mL/min at this time; BMI 49.23 kg/m2  Will start piperacillin-tazobactam 4.5 g IV every 8 hours     Pharmacy will continue to follow daily while on piperacillin-tazobactam and adjust as needed. Thank you for this consult.    [facility], located within [facility]  Clinical Pharmacist

## 2023-05-24 NOTE — THERAPY EVALUATION
Acute Care - Speech Language Pathology   Swallow Initial Evaluation [facility]     Patient Name: Sarah Frey  : 1970  MRN: [id number]  Today's Date: 2023               Admit Date: 2023    Visit Dx:     ICD-10-CM ICD-9-CM   1. Pneumonia of right lung due to infectious organism, unspecified part of lung  J18.9 483.8   2. Leukocytosis, unspecified type  D72.829 288.60     Patient Active Problem List   Diagnosis   • Asthma   • Low back pain   • Deep vein thrombosis of left lower extremity   • Obstructive sleep apnea syndrome   • Paroxysmal atrial fibrillation (HCC)   • Major depression, single episode   • Excessive or frequent menstruation   • Polyp of corpus uteri   • Benign essential hypertension   • Mitral valve regurgitation   • Obesity   • Encounter for gynecological examination   • History of deep vein thrombosis (DVT) of lower extremity   • Allergic rhinitis   • Arthritis   • Personal history of other venous thrombosis and embolism   • Polyphagia   • Viral hepatitis C   • Acute respiratory failure with hypoxia   • Cardiomegaly   • Pneumonia due to COVID-19 virus   • Hypertensive disorder   • Chronic anticoagulation   • Deep venous thrombosis   • Pneumonia   • Sepsis due to pneumonia   • Abnormal liver diagnostic imaging   • Pneumonia of right lung due to infectious organism, unspecified part of lung   • Dysphagia   • History of laparoscopic adjustable gastric banding     Past Medical History:   Diagnosis Date   • Asthma 2/3/2020   • Deep vein thrombosis of left lower extremity 3/19/2018   • Essential hypertension, benign 2011   • Excessive or frequent menstruation 2020   • Low back pain 2019   • Major depressive disorder, single episode, unspecified 2012   • Mitral valve insufficiency 2008   • Obesity, unspecified 2020   • Obstructive sleep apnea (adult) (pediatric) 3/19/2018   • Paroxysmal atrial fibrillation 3/27/2017   • Polyp of corpus uteri 2012      Past Surgical History:   Procedure Laterality Date   • BARIATRIC SURGERY  10/21/2020   •  SECTION  2005   • TONSILLECTOMY  2012   • TUBAL ABDOMINAL LIGATION  10/21/2010       SLP Recommendation and Plan  SLP Swallowing Diagnosis: functional oral phase, functional pharyngeal phase (23)  SLP Diet Recommendation: regular textures, thin liquids (23)  Recommended Precautions and Strategies: upright posture during/after eating, small bites of food and sips of liquid, alternate between small bites of food and sips of liquid (23)  SLP Rec. for Method of Medication Administration: meds whole, as tolerated (23)     Monitor for Signs of Aspiration: yes (23)     Swallow Criteria for Skilled Therapeutic Interventions Met: no problems identified which require skilled intervention (23)  Anticipated Discharge Disposition (SLP): home (23)     Therapy Frequency (Swallow): evaluation only (23)                                           Plan of Care Reviewed With: patient  Outcome Evaluation: Swallow evaluated. Pt presented with trials of thin (cups/straw), pureed, soft, mixed, and regular. Mastication and bolus control was functional. Pt showed no overt s/s of aspiration. Laryngeal elevation was adequate with palpation. Discussed w/ pt. Pt reported esophageal complaints when she over eats. Feel pt's pharyngeal swallow is functional. Consider GI consult to assess esophageal complaints. ST to sign off at this time. If silent aspiration is suspected, please order VFSS.      SWALLOW EVALUATION (last 72 hours)     SLP Adult Swallow Evaluation     Row Name 23                   Rehab Evaluation    Document Type evaluation  -AW        Subjective Information no complaints  -AW        Patient Observations alert;cooperative;agree to therapy  -AW        Patient/Family/Caregiver Comments/Observations Pt oriented x4.   -AW        Patient Effort good  -AW        Symptoms Noted During/After Treatment none  -AW           General Information    Patient Profile Reviewed yes  -AW        Pertinent History Of Current Problem Pt admitted with RLL PNA. PMH: gastric banding (2020), asthma, obstructive sleep apnea, paroxysmal A-fib  -AW        Current Method of Nutrition regular textures;thin liquids  -AW        Precautions/Limitations, Vision WFL  -AW        Precautions/Limitations, Hearing WFL  -AW        Prior Level of Function-Communication WFL  -AW        Prior Level of Function-Swallowing no diet consistency restrictions  -AW        Plans/Goals Discussed with patient;agreed upon  -AW        Barriers to Rehab none identified  -AW        Patient's Goals for Discharge return home  -AW           Pain    Additional Documentation Pain Scale: Numbers Pre/Post-Treatment (Group)  -AW           Pain Scale: Numbers Pre/Post-Treatment    Pretreatment Pain Rating 0/10 - no pain  -AW        Posttreatment Pain Rating 0/10 - no pain  -AW           Oral Motor Structure and Function    Dentition Assessment natural, present and adequate  -AW        Secretion Management WNL/WFL  -AW        Mucosal Quality moist, healthy  -AW           Oral Musculature and Cranial Nerve Assessment    Oral Motor General Assessment WFL  -AW           General Eating/Swallowing Observations    Respiratory Support Currently in Use room air  -AW        Eating/Swallowing Skills self-fed  -AW        Positioning During Eating upright 90 degree  -AW        Utensils Used spoon;cup;straw  -AW        Consistencies Trialed regular textures;soft to chew textures;mixed consistency;pureed;thin liquids  -AW           Clinical Swallow Eval    Clinical Swallow Evaluation Summary Swallow evaluated. Pt presented with trials of thin (cups/straw), pureed, soft, mixed, and regular. Mastication and bolus control was functional. Pt showed no overt s/s of aspiration. Laryngeal elevation was adequate  with palpation. Discussed w/ pt. Pt reported esophageal complaints when she over eats. Feel pt's pharyngeal swallow is functional. Consider GI consult to assess esophageal complaints. ST to sign off at this time. If silent aspiration is suspected, please order VFSS.  -AW           SLP Evaluation Clinical Impression    SLP Swallowing Diagnosis functional oral phase;functional pharyngeal phase  -AW        Functional Impact no impact on function  -AW        Swallow Criteria for Skilled Therapeutic Interventions Met no problems identified which require skilled intervention  -AW           Recommendations    Therapy Frequency (Swallow) evaluation only  -AW        SLP Diet Recommendation regular textures;thin liquids  -AW        Recommended Precautions and Strategies upright posture during/after eating;small bites of food and sips of liquid;alternate between small bites of food and sips of liquid  -AW        Oral Care Recommendations Oral Care BID/PRN  -AW        SLP Rec. for Method of Medication Administration meds whole;as tolerated  -AW        Monitor for Signs of Aspiration yes  -AW        Anticipated Discharge Disposition (SLP) home  -AW              User Key  (r) = Recorded By, (t) = Taken By, (c) = Cosigned By    Initials Name Effective Dates    Eli Zamora MS CCC-SLP 06/16/21 -                 EDUCATION  The patient has been educated in the following areas:   Dysphagia (Swallowing Impairment) Oral Care/Hydration.              Time Calculation:    Time Calculation- SLP     Row Name 05/24/23 1226             Time Calculation- SLP    SLP Start Time 1000  -AW      SLP Received On 05/24/23  -AW            User Key  (r) = Recorded By, (t) = Taken By, (c) = Cosigned By    Initials Name Provider Type    Eli Zamora MS CCC-SLP Speech and Language Pathologist                Therapy Charges for Today     Code Description Service Date Service Provider Modifiers Qty    [id number]  ST EVAL ORAL PHARYNG SWALLOW 3  5/24/2023 Eli Perez, MS CCC-SLP GN 1               Eli Perez, MS MARTE-SLP  5/24/2023

## 2023-05-24 NOTE — PLAN OF CARE
Goal Outcome Evaluation:  Plan of Care Reviewed With: patient        Progress: no change  Outcome Evaluation: Pt admitted from ER to Obs due to pneumonia; IV anitbiotics given; pt passed swallow study; urine culture sent; awaiting sputum from pt; pt NPO; pt a&o resting in bed; vss

## 2023-05-24 NOTE — CASE MANAGEMENT/SOCIAL WORK
Discharge Planning Assessment  [facility]     Patient Name: Sarah Frey  MRN: [id number]  Today's Date: 5/24/2023    Admit Date: 5/23/2023    Plan: Home w/o needs   Discharge Needs Assessment     Row Name 05/24/23 1641       Living Environment    People in Home child(tanvi), dependent    Current Living Arrangements home    Potentially Unsafe Housing Conditions none    Primary Care Provided by self    Provides Primary Care For child(tanvi)    Family Caregiver if Needed parent(s)    Family Caregiver Names Mother: Jt Radha    Quality of Family Relationships supportive    Able to Return to Prior Arrangements yes       Resource/Environmental Concerns    Resource/Environmental Concerns none    Transportation Concerns none       Transition Planning    Patient/Family Anticipates Transition to home with family    Patient/Family Anticipated Services at Transition none    Transportation Anticipated family or friend will provide;car, drives self       Discharge Needs Assessment    Equipment Currently Used at Home cpap    Concerns to be Addressed denies needs/concerns at this time;adjustment to diagnosis/illness;basic needs    Anticipated Changes Related to Illness none    Equipment Needed After Discharge none               Discharge Plan     Row Name 05/24/23 1640       Plan    Plan Home w/o needs    Row Name 05/24/23 1636       Plan    Plan Home w/o needs    Patient/Family in Agreement with Plan unable to assess    Provided Post Acute Provider List? Yes    Post Acute Provider List Nursing Home;Home Health    Delivered To Patient    Method of Delivery In person    Plan Comments Pt confirmed the address, PCP and [facility] are correct, she said she would like to use [facility] at discharge due to on her last discharge her pharmacy did not have the meds to fill her Rx, pt said her only DME is a CPAP that she does not use like she should, pt said she is IADL, her dependent children live with her, currently her  mother is watching them. Pt said she plans to return home at discharge and does not anticipate any needs.   Kathe Henry RN              Continued Care and Services - Admitted Since 5/23/2023    Coordination has not been started for this encounter.       Expected Discharge Date and Time     Expected Discharge Date Expected Discharge Time    May 25, 2023          Demographic Summary     Row Name 05/24/23 1640       General Information    Admission Type observation    Arrived From home    Referral Source admission list    Reason for Consult discharge planning    Preferred Language English       Contact Information    Permission Granted to Share Info With family/designee               Functional Status     Row Name 05/24/23 1640       Functional Status, IADL    Medications independent    Meal Preparation independent    Housekeeping independent    Laundry independent    Shopping independent               Kathe Henry, RN

## 2023-05-24 NOTE — H&P
Patient Name:  Sarah Frey  YOB: 1970  MRN:  [id number]  Admit Date:  5/23/2023  Patient Care Team:  Yousuf Dhillon MD as PCP - General (Internal Medicine)      Subjective   History Present Illness     Chief Complaint   Patient presents with   • Abnormal Imaging     History of Present Illness   Ms. Frey is a 52-year-old female with history of asthma, obstructive sleep apnea, paroxysmal A-fib on chronic anticoagulation, benign hypertension, history of DVT, cardiomegaly who presents to the emergency room with shortness of breath.  Patient was actually seen by her primary care physician earlier today with complaints of waking up choking and coughing and then developing some shortness of breath and chills.  Patient states she has had history of aspiration pneumonia in the past due to her gastric banding in the past.  She did have a chest x-ray done as outpatient that was abnormal and concerning for pneumonia so she was called and told to come to the emergency room for further evaluation.  Upon arrival here to the emergency room patient was on room air with sats 100%, she denies any acute distress or shortness of breath at this time.  In the emergency room her previous outpatient x-ray was reviewed which does show pneumonia in the right lower lobe and to a lesser extent in the right upper lobe and right middle lobe.  Patient's initial troponin was 6 with a repeat troponin of 12 and a delta of 6, but she denies any chest pain at this time.  Her glucose was 82, sodium 143, potassium 3.7, creatinine 0.85, BUN 14, lactate 1.0, magnesium 1.8, procalcitonin 0.19, white blood cell count 17.7, hemoglobin 12.1, hematocrit 36.5, platelets 203.  Patient was given dose of Rocephin and azithromycin in the emergency room.  Respiratory viral panel was negative, strep pneumonia of the urine was negative, Legionella was negative, blood cultures pending.    Review of Systems   Constitutional: Negative for  appetite change and fever.   HENT: Negative for nosebleeds and trouble swallowing.    Eyes: Negative for photophobia, redness and visual disturbance.   Respiratory: Positive for cough, choking and shortness of breath. Negative for chest tightness and wheezing.    Cardiovascular: Negative for chest pain, palpitations and leg swelling.   Gastrointestinal: Negative for abdominal distention, abdominal pain, nausea and vomiting.   Endocrine: Negative.    Genitourinary: Negative.    Musculoskeletal: Negative for gait problem and joint swelling.   Skin: Negative.    Neurological: Negative for dizziness, seizures, speech difficulty, light-headedness and headaches.   Hematological: Negative.    Psychiatric/Behavioral: Negative for behavioral problems and confusion.        Personal History     Past Medical History:   Diagnosis Date   • Asthma 2/3/2020   • Deep vein thrombosis of left lower extremity 3/19/2018   • Essential hypertension, benign 2011   • Excessive or frequent menstruation 2020   • Low back pain 2019   • Major depressive disorder, single episode, unspecified 2012   • Mitral valve insufficiency 2008   • Obesity, unspecified 2020   • Obstructive sleep apnea (adult) (pediatric) 3/19/2018   • Paroxysmal atrial fibrillation 3/27/2017   • Polyp of corpus uteri 2012     Past Surgical History:   Procedure Laterality Date   • BARIATRIC SURGERY  10/21/2020   •  SECTION  2005   • TONSILLECTOMY  2012   • TUBAL ABDOMINAL LIGATION  10/21/2010     Family History   Problem Relation Age of Onset   • Anxiety disorder Mother    • Arthritis Mother    • Asthma Mother    • Hyperlipidemia Mother      Social History     Tobacco Use   • Smoking status: Never   • Smokeless tobacco: Never   Vaping Use   • Vaping Use: Never used   Substance Use Topics   • Alcohol use: Not Currently     Comment: occas   • Drug use: Never     No current facility-administered medications on file  prior to encounter.     Current Outpatient Medications on File Prior to Encounter   Medication Sig Dispense Refill   • acetaminophen (TYLENOL) 500 MG tablet Take 1 tablet by mouth As Needed for Mild Pain.     • apixaban (ELIQUIS) 5 MG tablet tablet Take 1 tablet by mouth 2 (Two) Times a Day.     • ascorbic acid (VITAMIN C) 500 MG tablet Take 1 tablet by mouth As Needed.     • busPIRone (BUSPAR) 15 MG tablet Take 1 tablet by mouth 2 (Two) Times a Day.     • flecainide (TAMBOCOR) 50 MG tablet Take 1 tablet by mouth Every 12 (Twelve) Hours.     • hydroCHLOROthiazide (HYDRODIURIL) 25 MG tablet Take 1 tablet by mouth Daily.     • metoprolol succinate XL (TOPROL-XL) 50 MG 24 hr tablet Take 1 tablet by mouth once daily (Patient taking differently: Take 25 mg by mouth Daily.) 30 tablet 5   • multivitamin (THERAGRAN) tablet tablet Take 1 tablet by mouth As Needed.       No Known Allergies    Objective    Objective     Vital Signs  Temp:  [98.8 °F (37.1 °C)-99.7 °F (37.6 °C)] 98.8 °F (37.1 °C)  Heart Rate:  [55-73] 66  Resp:  [16-18] 16  BP: (122-155)/() 122/52  SpO2:  [95 %-100 %] 100 %  on   ;   Device (Oxygen Therapy): room air  Body mass index is 48.76 kg/m².    Physical Exam  Vitals and nursing note reviewed.   Constitutional:       General: She is not in acute distress.     Appearance: She is well-developed.   HENT:      Head: Normocephalic.   Neck:      Vascular: No JVD.   Cardiovascular:      Rate and Rhythm: Normal rate and regular rhythm.      Heart sounds: Normal heart sounds.      Comments: Normal sinus rhythm with heart rate 78 during my exam  Pulmonary:      Effort: Pulmonary effort is normal.      Breath sounds: Normal breath sounds.      Comments: Diminished lung sounds with slight wheezing on the right side.  Her sats are 99% on room air during my exam she appears to be in no acute distress.  Abdominal:      General: There is no distension.      Palpations: Abdomen is soft.      Tenderness: There is no  abdominal tenderness.   Musculoskeletal:         General: Normal range of motion.      Cervical back: Normal range of motion.      Right lower leg: No edema.      Left lower leg: No edema.   Skin:     General: Skin is warm and dry.      Capillary Refill: Capillary refill takes less than 2 seconds.   Neurological:      General: No focal deficit present.      Mental Status: She is alert and oriented to person, place, and time.   Psychiatric:         Mood and Affect: Mood normal.         Speech: Speech normal.         Behavior: Behavior normal.         Cognition and Memory: Cognition normal.         Judgment: Judgment normal.         Results Review:  I reviewed the patient's new clinical results.  I reviewed the patient's new imaging results and agree with the interpretation.  I reviewed the patient's other test results and agree with the interpretation  I personally viewed and interpreted the patient's EKG/Telemetry data  Discussed with ED provider.    Lab Results (last 24 hours)     Procedure Component Value Units Date/Time    Respiratory Panel PCR w/COVID-19(SARS-CoV-2) [facility] In-House, NP Swab in UTM/VTM, 3-4 HR TAT - Swab, Nasopharynx [[id number]]  (Normal) Collected: 05/23/23 2103    Specimen: Swab from Nasopharynx Updated: 05/23/23 2213     ADENOVIRUS, PCR Not Detected     Coronavirus 229E Not Detected     Coronavirus HKU1 Not Detected     Coronavirus NL63 Not Detected     Coronavirus OC43 Not Detected     COVID19 Not Detected     Human Metapneumovirus Not Detected     Human Rhinovirus/Enterovirus Not Detected     Influenza A PCR Not Detected     Influenza B PCR Not Detected     Parainfluenza Virus 1 Not Detected     Parainfluenza Virus 2 Not Detected     Parainfluenza Virus 3 Not Detected     Parainfluenza Virus 4 Not Detected     RSV, PCR Not Detected     Bordetella pertussis pcr Not Detected     Bordetella parapertussis PCR Not Detected     Chlamydophila pneumoniae PCR Not Detected      Mycoplasma pneumo by PCR Not Detected    Narrative:      In the setting of a positive respiratory panel with a viral infection PLUS a negative procalcitonin without other underlying concern for bacterial infection, consider observing off antibiotics or discontinuation of antibiotics and continue supportive care. If the respiratory panel is positive for atypical bacterial infection (Bordetella pertussis, Chlamydophila pneumoniae, or Mycoplasma pneumoniae), consider antibiotic de-escalation to target atypical bacterial infection.    CBC & Differential [[id number]]  (Abnormal) Collected: 05/23/23 2104    Specimen: Blood Updated: 05/23/23 2121    Narrative:      The following orders were created for panel order CBC & Differential.  Procedure                               Abnormality         Status                     ---------                               -----------         ------                     CBC Auto Differential[[id number]]        Abnormal            Final result                 Please view results for these tests on the individual orders.    Comprehensive Metabolic Panel [[id number]] Collected: 05/23/23 2104    Specimen: Blood Updated: 05/23/23 2142     Glucose 82 mg/dL      BUN 14 mg/dL      Creatinine 0.85 mg/dL      Sodium 143 mmol/L      Potassium 3.7 mmol/L      Comment: Slight hemolysis detected by analyzer. Results may be affected.        Chloride 105 mmol/L      CO2 27.9 mmol/L      Calcium 9.6 mg/dL      Total Protein 6.8 g/dL      Albumin 3.8 g/dL      ALT (SGPT) 14 U/L      AST (SGOT) 18 U/L      Alkaline Phosphatase 73 U/L      Total Bilirubin 0.7 mg/dL      Globulin 3.0 gm/dL      A/G Ratio 1.3 g/dL      BUN/Creatinine Ratio 16.5     Anion Gap 10.1 mmol/L      eGFR 83.6 mL/min/1.73     Narrative:      GFR Normal >60  Chronic Kidney Disease <60  Kidney Failure <15      Lactic Acid, Plasma [[id number]]  (Normal) Collected: 05/23/23 2104    Specimen: Blood Updated: 05/23/23 2136     Lactate 1.0 mmol/L  "    Procalcitonin [[id number]]  (Normal) Collected: 05/23/23 2104    Specimen: Blood Updated: 05/23/23 2148     Procalcitonin 0.19 ng/mL     Narrative:      As a Marker for Sepsis (Non-Neonates):    1. <0.5 ng/mL represents a low risk of severe sepsis and/or septic shock.  2. >2 ng/mL represents a high risk of severe sepsis and/or septic shock.    As a Marker for Lower Respiratory Tract Infections that require antibiotic therapy:    PCT on Admission    Antibiotic Therapy       6-12 Hrs later    >0.5                Strongly Recommended  >0.25 - <0.5        Recommended   0.1 - 0.25          Discouraged              Remeasure/reassess PCT  <0.1                Strongly Discouraged     Remeasure/reassess PCT    As 28 day mortality risk marker: \"Change in Procalcitonin Result\" (>80% or <=80%) if Day 0 (or Day 1) and Day 4 values are available. Refer to http://www.Cinemacraftpct-calculator.com    Change in PCT <=80%  A decrease of PCT levels below or equal to 80% defines a positive change in PCT test result representing a higher risk for 28-day all-cause mortality of patients diagnosed with severe sepsis for septic shock.    Change in PCT >80%  A decrease of PCT levels of more than 80% defines a negative change in PCT result representing a lower risk for 28-day all-cause mortality of patients diagnosed with severe sepsis or septic shock.       Blood Culture - Blood, Arm, Right [[id number]] Collected: 05/23/23 2104    Specimen: Blood from Arm, Right Updated: 05/23/23 2114    High Sensitivity Troponin T [[id number]]  (Normal) Collected: 05/23/23 2104    Specimen: Blood Updated: 05/23/23 2148     HS Troponin T 6 ng/L     Narrative:      High Sensitive Troponin T Reference Range:  <10.0 ng/L- Negative Female for AMI  <15.0 ng/L- Negative Male for AMI  >=10 - Abnormal Female indicating possible myocardial injury.  >=15 - Abnormal Male indicating possible myocardial injury.   Clinicians would have to utilize clinical acumen, EKG, " Troponin, and serial changes to determine if it is an Acute Myocardial Infarction or myocardial injury due to an underlying chronic condition.         BNP [[id number]]  (Normal) Collected: 05/23/23 2104    Specimen: Blood Updated: 05/23/23 2148     proBNP 171.0 pg/mL     Narrative:      Among patients with dyspnea, NT-proBNP is highly sensitive for the detection of acute congestive heart failure. In addition NT-proBNP of <300 pg/ml effectively rules out acute congestive heart failure with 99% negative predictive value.    Results may be falsely decreased if patient taking Biotin.      Magnesium [[id number]]  (Normal) Collected: 05/23/23 2104    Specimen: Blood Updated: 05/23/23 2142     Magnesium 1.8 mg/dL     CBC Auto Differential [[id number]]  (Abnormal) Collected: 05/23/23 2104    Specimen: Blood Updated: 05/23/23 2121     WBC 17.74 10*3/mm3      RBC 4.01 10*6/mm3      Hemoglobin 12.1 g/dL      Hematocrit 36.5 %      MCV 91.0 fL      MCH 30.2 pg      MCHC 33.2 g/dL      RDW 13.0 %      RDW-SD 43.4 fl      MPV 11.0 fL      Platelets 203 10*3/mm3      Neutrophil % 74.5 %      Lymphocyte % 17.6 %      Monocyte % 6.3 %      Eosinophil % 0.9 %      Basophil % 0.2 %      Immature Grans % 0.5 %      Neutrophils, Absolute 13.22 10*3/mm3      Lymphocytes, Absolute 3.12 10*3/mm3      Monocytes, Absolute 1.12 10*3/mm3      Eosinophils, Absolute 0.16 10*3/mm3      Basophils, Absolute 0.04 10*3/mm3      Immature Grans, Absolute 0.08 10*3/mm3      nRBC 0.0 /100 WBC     Blood Culture - Blood, Arm, Left [[id number]] Collected: 05/23/23 2158    Specimen: Blood from Arm, Left Updated: 05/23/23 2204    High Sensitivity Troponin T 2Hr [[id number]]  (Abnormal) Collected: 05/23/23 2331    Specimen: Blood Updated: 05/24/23 0026     HS Troponin T 12 ng/L      Troponin T Delta 6 ng/L     Narrative:      High Sensitive Troponin T Reference Range:  <10.0 ng/L- Negative Female for AMI  <15.0 ng/L- Negative Male for AMI  >=10 - Abnormal Female  indicating possible myocardial injury.  >=15 - Abnormal Male indicating possible myocardial injury.   Clinicians would have to utilize clinical acumen, EKG, Troponin, and serial changes to determine if it is an Acute Myocardial Infarction or myocardial injury due to an underlying chronic condition.         Legionella Antigen, Urine - Urine, Urine, Clean Catch [[id number]]  (Normal) Collected: 05/24/23 0042    Specimen: Urine, Clean Catch Updated: 05/24/23 0121     LEGIONELLA ANTIGEN, URINE Negative    S. Pneumo Ag Urine or CSF - Urine, Urine, Clean Catch [[id number]]  (Normal) Collected: 05/24/23 0042    Specimen: Urine, Clean Catch Updated: 05/24/23 0121     Strep Pneumo Ag Negative          Imaging Results (Last 24 Hours)     ** No results found for the last 24 hours. **              ECG 12 Lead Dyspnea   Preliminary Result   HEART RATE= 55  bpm   RR Interval= 1091  ms   ME Interval= 202  ms   P Horizontal Axis= -1  deg   P Front Axis= 49  deg   QRSD Interval= 111  ms   QT Interval= 463  ms   QRS Axis= 56  deg   T Wave Axis= -9  deg   - BORDERLINE ECG -   Sinus rhythm   Borderline prolonged ME interval   Borderline repolarization abnormality   Electronically Signed By:    Date and Time of Study: 2023-05-23 20:41:45           Assessment/Plan     Active Hospital Problems    Diagnosis  POA   • **Pneumonia of right lung due to infectious organism, unspecified part of lung [J18.9]  Yes   • History of laparoscopic adjustable gastric banding [Z98.84]  Not Applicable   • Chronic anticoagulation [Z79.01]  Not Applicable   • Cardiomegaly [I51.7]  Yes   • History of deep vein thrombosis (DVT) of lower extremity [Z86.718]  Not Applicable   • Asthma [J45.909]  Yes   • Obstructive sleep apnea syndrome [G47.33]  Yes     Description: (PL)     • Paroxysmal atrial fibrillation (HCC) [I48.0]  Yes   • Benign essential hypertension [I10]  Yes     Description: (PL)     · Ms. Frey is a 52-year-old female with history of asthma,  obstructive sleep apnea, paroxysmal A-fib on chronic anticoagulation, benign hypertension, history of DVT, cardiomegaly who presents to the emergency room with shortness of breath.    Pneumonia of the right lung  -Speech therapy to eval and treat, there is concern for some aspiration  -We will start patient on Zosyn to cover aspiration pneumonia  -Blood cultures are pending, sputum cultures pending  -O2 to keep sats above 90%, patient currently on room air with sats 99%  -Continuous pulse oximetry overnight  -Recheck CBC in a.m.    Paroxysmal A-fib/cardiomegaly/anticoagulation  -Continue Eliquis  -Patient currently normal sinus rhythm on the monitor, no chest pain or shortness of breath  -Monitor fluid volume closely    Obstructive sleep apnea/asthma  -O2 to keep sats above 90%  -Continuous pulse oximetry      · I discussed the patient's findings and my recommendations with patient.    VTE Prophylaxis - Eliquis (home med).  Code Status - Full code.       MARIA LUISA Nation  [facility]  05/24/23  03:23 EDT

## 2023-05-25 ENCOUNTER — READMISSION MANAGEMENT (OUTPATIENT)
Dept: CALL CENTER | Facility: HOSPITAL | Age: 53
End: 2023-05-25
Payer: COMMERCIAL

## 2023-05-25 VITALS
HEART RATE: 68 BPM | TEMPERATURE: 97.7 F | WEIGHT: 293 LBS | DIASTOLIC BLOOD PRESSURE: 54 MMHG | BODY MASS INDEX: 47.09 KG/M2 | RESPIRATION RATE: 16 BRPM | OXYGEN SATURATION: 100 % | SYSTOLIC BLOOD PRESSURE: 105 MMHG | HEIGHT: 66 IN

## 2023-05-25 LAB
ANION GAP SERPL CALCULATED.3IONS-SCNC: 10 MMOL/L (ref 5–15)
BASOPHILS # BLD AUTO: 0.03 10*3/MM3 (ref 0–0.2)
BASOPHILS NFR BLD AUTO: 0.4 % (ref 0–1.5)
BUN SERPL-MCNC: 10 MG/DL (ref 6–20)
BUN/CREAT SERPL: 13.2 (ref 7–25)
CALCIUM SPEC-SCNC: 8.8 MG/DL (ref 8.6–10.5)
CHLORIDE SERPL-SCNC: 105 MMOL/L (ref 98–107)
CO2 SERPL-SCNC: 27 MMOL/L (ref 22–29)
CREAT SERPL-MCNC: 0.76 MG/DL (ref 0.57–1)
DEPRECATED RDW RBC AUTO: 41.9 FL (ref 37–54)
EGFRCR SERPLBLD CKD-EPI 2021: 95.6 ML/MIN/1.73
EOSINOPHIL # BLD AUTO: 0.38 10*3/MM3 (ref 0–0.4)
EOSINOPHIL NFR BLD AUTO: 4.8 % (ref 0.3–6.2)
ERYTHROCYTE [DISTWIDTH] IN BLOOD BY AUTOMATED COUNT: 13.1 % (ref 12.3–15.4)
GLUCOSE SERPL-MCNC: 96 MG/DL (ref 65–99)
HCT VFR BLD AUTO: 34.7 % (ref 34–46.6)
HGB BLD-MCNC: 11.5 G/DL (ref 12–15.9)
IMM GRANULOCYTES # BLD AUTO: 0.03 10*3/MM3 (ref 0–0.05)
IMM GRANULOCYTES NFR BLD AUTO: 0.4 % (ref 0–0.5)
LYMPHOCYTES # BLD AUTO: 2.36 10*3/MM3 (ref 0.7–3.1)
LYMPHOCYTES NFR BLD AUTO: 29.8 % (ref 19.6–45.3)
MAGNESIUM SERPL-MCNC: 2 MG/DL (ref 1.6–2.6)
MCH RBC QN AUTO: 29.4 PG (ref 26.6–33)
MCHC RBC AUTO-ENTMCNC: 33.1 G/DL (ref 31.5–35.7)
MCV RBC AUTO: 88.7 FL (ref 79–97)
MONOCYTES # BLD AUTO: 0.79 10*3/MM3 (ref 0.1–0.9)
MONOCYTES NFR BLD AUTO: 10 % (ref 5–12)
NEUTROPHILS NFR BLD AUTO: 4.33 10*3/MM3 (ref 1.7–7)
NEUTROPHILS NFR BLD AUTO: 54.6 % (ref 42.7–76)
NRBC BLD AUTO-RTO: 0 /100 WBC (ref 0–0.2)
PHOSPHATE SERPL-MCNC: 4.4 MG/DL (ref 2.5–4.5)
PLATELET # BLD AUTO: 200 10*3/MM3 (ref 140–450)
PMV BLD AUTO: 10.6 FL (ref 6–12)
POTASSIUM SERPL-SCNC: 4 MMOL/L (ref 3.5–5.2)
RBC # BLD AUTO: 3.91 10*6/MM3 (ref 3.77–5.28)
SODIUM SERPL-SCNC: 142 MMOL/L (ref 136–145)
WBC NRBC COR # BLD: 7.92 10*3/MM3 (ref 3.4–10.8)

## 2023-05-25 PROCEDURE — 83735 ASSAY OF MAGNESIUM: CPT | Performed by: STUDENT IN AN ORGANIZED HEALTH CARE EDUCATION/TRAINING PROGRAM

## 2023-05-25 PROCEDURE — 80048 BASIC METABOLIC PNL TOTAL CA: CPT | Performed by: STUDENT IN AN ORGANIZED HEALTH CARE EDUCATION/TRAINING PROGRAM

## 2023-05-25 PROCEDURE — 85025 COMPLETE CBC W/AUTO DIFF WBC: CPT | Performed by: STUDENT IN AN ORGANIZED HEALTH CARE EDUCATION/TRAINING PROGRAM

## 2023-05-25 PROCEDURE — G0378 HOSPITAL OBSERVATION PER HR: HCPCS

## 2023-05-25 PROCEDURE — 84100 ASSAY OF PHOSPHORUS: CPT | Performed by: STUDENT IN AN ORGANIZED HEALTH CARE EDUCATION/TRAINING PROGRAM

## 2023-05-25 PROCEDURE — 25010000002 PIPERACILLIN SOD-TAZOBACTAM PER 1 G: Performed by: NURSE PRACTITIONER

## 2023-05-25 RX ORDER — AMOXICILLIN AND CLAVULANATE POTASSIUM 875; 125 MG/1; MG/1
1 TABLET, FILM COATED ORAL 2 TIMES DAILY
Qty: 12 TABLET | Refills: 0 | Status: SHIPPED | OUTPATIENT
Start: 2023-05-25 | End: 2023-05-31

## 2023-05-25 RX ORDER — METOPROLOL SUCCINATE 50 MG/1
25 TABLET, EXTENDED RELEASE ORAL DAILY
Start: 2023-05-25

## 2023-05-25 RX ADMIN — METOPROLOL SUCCINATE 25 MG: 25 TABLET, EXTENDED RELEASE ORAL at 12:02

## 2023-05-25 RX ADMIN — DOCUSATE SODIUM 50MG AND SENNOSIDES 8.6MG 2 TABLET: 8.6; 5 TABLET, FILM COATED ORAL at 08:06

## 2023-05-25 RX ADMIN — APIXABAN 5 MG: 5 TABLET, FILM COATED ORAL at 08:06

## 2023-05-25 RX ADMIN — Medication 10 ML: at 08:07

## 2023-05-25 RX ADMIN — HYDROCHLOROTHIAZIDE 25 MG: 25 TABLET ORAL at 08:06

## 2023-05-25 RX ADMIN — FLECAINIDE ACETATE TABLET 50 MG: 50 TABLET ORAL at 12:02

## 2023-05-25 RX ADMIN — PIPERACILLIN SODIUM AND TAZOBACTAM SODIUM 4.5 G: 4; .5 INJECTION, SOLUTION INTRAVENOUS at 03:20

## 2023-05-25 RX ADMIN — BUSPIRONE HYDROCHLORIDE 15 MG: 15 TABLET ORAL at 08:06

## 2023-05-25 NOTE — DISCHARGE SUMMARY
Patient Name: Sarah Frey  : 1970  MRN: [id number]    Date of Admission: 2023  Date of Discharge:  2023  Primary Care Physician: Yousuf Dhillon MD      Chief Complaint:   Abnormal Imaging      Discharge Diagnoses     Active Hospital Problems    Diagnosis  POA   • **Pneumonia of right lung due to infectious organism, unspecified part of lung [J18.9]  Yes   • History of laparoscopic adjustable gastric banding [Z98.84]  Not Applicable   • Chronic anticoagulation [Z79.01]  Not Applicable   • Cardiomegaly [I51.7]  Yes   • History of deep vein thrombosis (DVT) of lower extremity [Z86.718]  Not Applicable   • Asthma [J45.909]  Yes   • Obstructive sleep apnea syndrome [G47.33]  Yes   • Paroxysmal atrial fibrillation (HCC) [I48.0]  Yes   • Benign essential hypertension [I10]  Yes      Resolved Hospital Problems   No resolved problems to display.        Hospital Course     Ms. Frey is a 52 y.o. female with a history of paroxysmal atrial fibrillation, hypertension, MANJIT, depression who presented to [facility] initially complaining of abnormal imaging from her outpatient PCP/concern for aspiration pneumonia.  Please see the admitting history and physical for further details.  She was found to have pneumonia and was admitted to the hospital for further evaluation and treatment.  Blood cultures were collected and the patient was initiated on Zosyn given concern for aspirational component of her pneumonia.  The patient improved over the course of her hospitalization, her oxygen saturations maintained 98 to 100% on room air throughout her hospital stay, WBC has improved from 17 to 7 at discharge.  The patient is stable to discharge home with Augmentin to complete a 7-day course of antibiotics.  She was also evaluated by speech therapy who did not identify any signs or symptoms of aspiration.  The patient reports her aspiration tends to be when she eats specific foods/eats too quickly  which she identifies as related to her previous gastric band surgery.  She has been instructed to follow-up with her PCP in a week for posthospital follow-up visit.  The patient was also noted to be bradycardic on the morning of her discharge.  She reports that she follows with a [facility] cardiologist and they have been adjusting her metoprolol for bradycardia.  She has follow-up scheduled with them next week and she has been instructed to keep this appointment.  Day of Discharge     Subjective:  Ambulating in her room on my arrival, she feels well and would like to discharge today.  She has maintained on room air throughout her stay.  She was mildly bradycardic this morning which the patient reports is not new for her and actually her heart rate in the 60s here is improved from several weeks ago when she saw her cardiologist at [facility] and she reports her heart rate was in the 40s.      Physical Exam:  Temp:  [97.7 °F (36.5 °C)-98.1 °F (36.7 °C)] 97.7 °F (36.5 °C)  Heart Rate:  [48-68] 68  Resp:  [16] 16  BP: (101-132)/(54-94) 105/54  Body mass index is 48.7 kg/m².  Physical Exam  Constitutional:       General: She is not in acute distress.     Appearance: Normal appearance. She is not ill-appearing.   Cardiovascular:      Rate and Rhythm: Regular rhythm. Bradycardia present.   Pulmonary:      Effort: Pulmonary effort is normal. No respiratory distress.   Abdominal:      General: Abdomen is flat.      Palpations: Abdomen is soft.   Musculoskeletal:         General: No swelling or tenderness.      Right lower leg: No edema.      Left lower leg: No edema.   Skin:     General: Skin is warm and dry.   Neurological:      General: No focal deficit present.      Mental Status: She is alert and oriented to person, place, and time. Mental status is at baseline.         Consultants     Consult Orders (all) (From admission, onward)     Start     Ordered    05/23/23 3842  [facility] (on-call MD unless specified) Details  Once,   Status:   Canceled        Specialty:  Hospitalist  Provider:  (Not yet assigned)    05/23/23 2316              Procedures     Imaging Results (All)     None          Pertinent Labs     Results from last 7 days   Lab Units 05/25/23  0733 05/24/23 0725 05/23/23 2104   WBC 10*3/mm3 7.92 13.10* 17.74*   HEMOGLOBIN g/dL 11.5* 10.9* 12.1   PLATELETS 10*3/mm3 200 179 203     Results from last 7 days   Lab Units 05/25/23  0733 05/24/23  1830 05/24/23  0725 05/23/23 2104   SODIUM mmol/L 142  --  142 143   POTASSIUM mmol/L 4.0 4.0 3.5 3.7   CHLORIDE mmol/L 105  --  107 105   CO2 mmol/L 27.0  --  25.8 27.9   BUN mg/dL 10  --  11 14   CREATININE mg/dL 0.76  --  0.83 0.85   GLUCOSE mg/dL 96  --  87 82   Estimated Creatinine Clearance: 126.4 mL/min (by C-G formula based on SCr of 0.76 mg/dL).  Results from last 7 days   Lab Units 05/23/23 2104   ALBUMIN g/dL 3.8   BILIRUBIN mg/dL 0.7   ALK PHOS U/L 73   AST (SGOT) U/L 18   ALT (SGPT) U/L 14     Results from last 7 days   Lab Units 05/25/23  0733 05/24/23 0725 05/23/23 2104   CALCIUM mg/dL 8.8 9.0 9.6   ALBUMIN g/dL  --   --  3.8   MAGNESIUM mg/dL 2.0  --  1.8   PHOSPHORUS mg/dL 4.4  --   --        Results from last 7 days   Lab Units 05/24/23  0725 05/23/23  2331 05/23/23 2104   HSTROP T ng/L <6 12* 6   PROBNP pg/mL  --   --  171.0           Invalid input(s): LDLCALC  Results from last 7 days   Lab Units 05/23/23 2158 05/23/23 2104   BLOODCX  No growth at 24 hours No growth at 24 hours       Test Results Pending at Discharge     Pending Labs     Order Current Status    Blood Culture - Blood, Arm, Left Preliminary result    Blood Culture - Blood, Arm, Right Preliminary result          Discharge Details        Discharge Medications      New Medications      Instructions Start Date   amoxicillin-clavulanate 875-125 MG per tablet  Commonly known as: AUGMENTIN   1 tablet, Oral, 2 Times Daily         Continue These Medications      Instructions Start Date   acetaminophen 500 MG  tablet  Commonly known as: TYLENOL   500 mg, Oral, As Needed      apixaban 5 MG tablet tablet  Commonly known as: ELIQUIS   5 mg, Oral, 2 Times Daily      ascorbic acid 500 MG tablet  Commonly known as: VITAMIN C   500 mg, Oral, As Needed      busPIRone 15 MG tablet  Commonly known as: BUSPAR   15 mg, Oral, 2 Times Daily      flecainide 50 MG tablet  Commonly known as: TAMBOCOR   50 mg, Oral, Every 12 Hours      hydroCHLOROthiazide 25 MG tablet  Commonly known as: HYDRODIURIL   25 mg, Oral, Daily      metoprolol succinate XL 50 MG 24 hr tablet  Commonly known as: TOPROL-XL   25 mg, Oral, Daily      multivitamin tablet tablet   1 tablet, Oral, As Needed             No Known Allergies      Discharge Disposition:  Home or Self Care    Discharge Diet:  No active diet order      Discharge Activity:   Activity Instructions     Activity as Tolerated            CODE STATUS:    Code Status and Medical Interventions:   Ordered at: 05/24/23 0002     Code Status (Patient has no pulse and is not breathing):    CPR (Attempt to Resuscitate)     Medical Interventions (Patient has pulse or is breathing):    Full Support       No future appointments.  Additional Instructions for the Follow-ups that You Need to Schedule     Discharge Follow-up with PCP   As directed       Currently Documented PCP:    Yousuf Dhillon MD    PCP Phone Number:    [phone]     Follow Up Details: 1 week post hospital follow up         Discharge Follow-up with Specified Provider: your cardiologist for further recommendations on rate control medications (metoprolol)   As directed      To: your cardiologist for further recommendations on rate control medications (metoprolol)            Follow-up Information     Yousuf Dhillon MD .    Specialty: Internal Medicine  Contact information:  [address] [place] [address]  [phone]             Yousuf Dhillon MD .    Specialty: Internal Medicine  Why: 1 week post hospital follow up  Contact  information:  [facility] [id number]  [phone]                         Additional Instructions for the Follow-ups that You Need to Schedule     Discharge Follow-up with PCP   As directed       Currently Documented PCP:    Yousuf Dhillon MD    PCP Phone Number:    [phone]     Follow Up Details: 1 week post hospital follow up         Discharge Follow-up with Specified Provider: your cardiologist for further recommendations on rate control medications (metoprolol)   As directed      To: your cardiologist for further recommendations on rate control medications (metoprolol)           Time Spent on Discharge:  I spent greater than 30 minutes on this discharge activity which included: face-to-face encounter with the patient, reviewing the data in the system, coordination of the care with the nursing staff as well as consultants, documentation, and entering orders.       Florida Kahn MD  [facility]  05/25/23  17:38 EDT

## 2023-05-25 NOTE — PLAN OF CARE
Goal Outcome Evaluation:  Plan of Care Reviewed With: patient        Progress: improving  Outcome Evaluation: VSS; IV antibiotics continue; New iv placed in the left arm; Pt ambulating in room; will continue to monitor

## 2023-05-26 ENCOUNTER — TRANSITIONAL CARE MANAGEMENT TELEPHONE ENCOUNTER (OUTPATIENT)
Dept: CALL CENTER | Facility: HOSPITAL | Age: 53
End: 2023-05-26
Payer: COMMERCIAL

## 2023-05-26 NOTE — OUTREACH NOTE
Prep Survey    Flowsheet Row Responses   [facility] [place] patient discharged from? [facility]   Is LACE score < 7 ? No   Eligibility [facility]   Date of Admission 05/23/23   Date of Discharge 05/25/23   Discharge Disposition Home or Self Care   Discharge diagnosis Pneumonia   Does the patient have one of the following disease processes/diagnoses(primary or secondary)? Pneumonia   Does the patient have Home health ordered? No   Is there a DME ordered? No   Prep survey completed? Yes          Ana Luisa CARRILLO - Registered Nurse

## 2023-05-26 NOTE — OUTREACH NOTE
Call Center TCM Note    Flowsheet Row Responses   [facility] patient discharged from? [place]   Does the patient have one of the following disease processes/diagnoses(primary or secondary)? Pneumonia   TCM attempt successful? No  [verbal release ]   Unsuccessful attempts Attempt 1          Rita Barahona RN    2023, 11:26 EDT

## 2023-05-26 NOTE — OUTREACH NOTE
Call Center TCM Note    Flowsheet Row Responses   [facility] patient discharged from? [place]   Does the patient have one of the following disease processes/diagnoses(primary or secondary)? Pneumonia   TCM attempt successful? Yes   Call start time 1419   Call end time 1423   Discharge diagnosis Pneumonia   Person spoke with today (if not patient) and relationship pt   Meds reviewed with patient/caregiver? Yes   Is the patient having any side effects they believe may be caused by any medication additions or changes? No   Does the patient have all medications ordered at discharge? Yes   Is the patient taking all medications as directed (includes completed medication regime)? Yes   Does the patient have an appointment with their PCP within 7 days of discharge? No appointments available   Nursing Interventions Routed TCM call to PCP office   Is the patient/caregiver able to teach back signs and symptoms of worsening condition: Fever/chills, Shortness of breath, Chest pain   Is the patient/caregiver able to teach back importance of completing antibiotic course of treatment? Yes   TCM call completed? Yes   Wrap up additional comments Pt states she is feeling better, and verbalized understanding to call PCP/go to ER if SOB, chest pain, fever develops. Reveiwed AVS/medications with pt. RN will send a message to PCP office as there are no appts that satisfy TCM guidelines.   Call end time 1423   Would this patient benefit from a Referral to Amb Social Work? No   Is the patient interested in additional calls from an ambulatory ?  NOTE:  applies to high risk patients requiring additional follow-up. No          Lindy Patterson RN    5/26/2023, 14:25 CDT

## 2023-05-28 LAB
BACTERIA SPEC AEROBE CULT: NORMAL
BACTERIA SPEC AEROBE CULT: NORMAL

## 2023-06-13 ENCOUNTER — READMISSION MANAGEMENT (OUTPATIENT)
Dept: CALL CENTER | Facility: HOSPITAL | Age: 53
End: 2023-06-13
Payer: COMMERCIAL

## 2023-06-16 ENCOUNTER — READMISSION MANAGEMENT (OUTPATIENT)
Dept: CALL CENTER | Facility: HOSPITAL | Age: 53
End: 2023-06-16
Payer: COMMERCIAL

## 2023-06-16 NOTE — OUTREACH NOTE
COPD/PN Week 3 Survey      Flowsheet Row Responses   [facility] facility patient discharged from? [place]   Does the patient have one of the following disease processes/diagnoses(primary or secondary)? Pneumonia   Week 3 attempt successful? No   Unsuccessful attempts Attempt 2            Ginny MELISSA - Registered Nurse

## 2023-08-19 NOTE — OUTREACH NOTE
COPD/PN Week 3 Survey      Flowsheet Row Responses   [facility] facility patient discharged from? [place]   Does the patient have one of the following disease processes/diagnoses(primary or secondary)? Pneumonia   Week 3 attempt successful? No   Unsuccessful attempts Attempt 1            Lindy Jaimes Registered Nurse   Received call from nursing staff stating patient was experiencing rigors and chills from IVIG.  Patient was being uptitrated from 1 mg/kg per hour to 2 mg/kg per hour up to 4 mg/kg per hour.  Was not experiencing any symptoms the for 2 doses and started experiencing symptoms around 4 mg/kg per hour.    Gave orders to give 1 dose of Dilaudid for back pain and supportive therapy, ibuprofen, Tylenol, Benadryl as needed supportively.  Plan is to resume IVIG at lower dose at 2 mg/kg per hour after rigors resolved.    Myron Cordero MD  Internal Medicine PGY-III  Pager# [phone]

## 2024-01-05 NOTE — TELEPHONE ENCOUNTER
Caller: Sarah Frey    Relationship: Self    Best call back number: [phone]    What medication are you requesting: MEDICATED EYE DROPS     What are your current symptoms: PINK EYE, SWELLING REDNESS, GUNK DRAINAGE- RIGHT EYE     How long have you been experiencing symptoms: LAST NIGHT INTO THIS MORNING     Have you had these symptoms before:  [x] Yes  [] No    Have you been treated for these symptoms before:  [x] Yes  [] No    If a prescription is needed, what is your preferred pharmacy and phone number:      [address] ([facility] [place]), [place] - [address] [facility] [phone] [facility] [phone]   [phone]    Additional notes:      PLEASE CALL AND ADVISE    No

## 2024-02-13 ENCOUNTER — OFFICE VISIT (OUTPATIENT)
Dept: FAMILY MEDICINE CLINIC | Facility: CLINIC | Age: 54
End: 2024-02-13
Payer: COMMERCIAL

## 2024-02-13 VITALS — BODY MASS INDEX: 47.09 KG/M2 | WEIGHT: 293 LBS | HEIGHT: 66 IN

## 2024-02-13 DIAGNOSIS — E01.0 THYROMEGALY: ICD-10-CM

## 2024-02-13 DIAGNOSIS — D50.9 IRON DEFICIENCY ANEMIA, UNSPECIFIED IRON DEFICIENCY ANEMIA TYPE: ICD-10-CM

## 2024-02-13 DIAGNOSIS — Z13.29 SCREENING FOR HYPOTHYROIDISM: ICD-10-CM

## 2024-02-13 DIAGNOSIS — E78.5 HYPERLIPIDEMIA, UNSPECIFIED HYPERLIPIDEMIA TYPE: ICD-10-CM

## 2024-02-13 DIAGNOSIS — J40 BRONCHITIS: ICD-10-CM

## 2024-02-13 DIAGNOSIS — I89.0 LYMPHEDEMA: ICD-10-CM

## 2024-02-13 DIAGNOSIS — I10 PRIMARY HYPERTENSION: ICD-10-CM

## 2024-02-13 DIAGNOSIS — Z12.11 SCREENING FOR COLON CANCER: Primary | ICD-10-CM

## 2024-02-13 DIAGNOSIS — Z00.00 ENCOUNTER FOR PHYSICAL EXAMINATION: ICD-10-CM

## 2024-02-13 NOTE — PROGRESS NOTES
02/13/2024    Assessment & Plan     This pleasant  presents for physical examination.    Regarding anticipatory guidance.  We discussed the importance of keeping her LDL cholesterol less than 100.  We gave her a low-cholesterol diet sheet for implementation at our direction.  Note is made that she has a BMI of 49.3.  Review of her LDL from 1022 shows her LDL was slightly elevated at 103.    She had bronchitis and subsequent pneumonia back in 3/23/and has made a good recovery.    She had laparoscopic gastric banding greater than 10 years ago and it is noted that she is on chronic anticoagulation secondary to DVT of the left lower extremity.        Diagnoses and all orders for this visit:    1. Screening for colon cancer (Primary)  -     Ambulatory Referral For Screening Colonoscopy  -     Comprehensive Metabolic Panel    2. Encounter for physical examination    3. Bronchitis    4. Primary hypertension  -     Lipid Panel With / Chol / HDL Ratio    5. Hyperlipidemia, unspecified hyperlipidemia type    6. Iron deficiency anemia, unspecified iron deficiency anemia type  -     CBC & Differential    7. Screening for hypothyroidism  -     TSH Rfx On Abnormal To Free T4    8. Thyromegaly  -     US Thyroid      Class 3 Severe Obesity (BMI >=40). Obesity-related health conditions include the following: obstructive sleep apnea and hypertension. Obesity is worsening. BMI is is above average; BMI management plan is completed. We discussed portion control and increasing exercise.    Plan:  1.)  Referral to the lymphedema clinic for evaluation  2.)  We have a goal of her losing 5 pounds in the next 2 months by decreasing her caloric intake  3.)  Ultrasound of the thyroid to evaluate thyromegaly  4.)  Schedule for colonoscopy  5.)  Follow-up in the next 2 to 3 months       CC: Annual Exam (No other issues---unable to hear bp)  .        HPI  History of Present Illness     Subjective   Sarah Frey is a 53 y.o. female.       The following portions of the patient's history were reviewed and updated as appropriate: allergies, current medications, past family history, past medical history, past social history, past surgical history, and problem list.    Problem List  Patient Active Problem List   Diagnosis    Asthma    Low back pain    Deep vein thrombosis of left lower extremity    Obstructive sleep apnea syndrome    Paroxysmal atrial fibrillation    Major depression, single episode    Excessive or frequent menstruation    Polyp of corpus uteri    Benign essential hypertension    Mitral valve regurgitation    Obesity    Encounter for gynecological examination    History of deep vein thrombosis (DVT) of lower extremity    Allergic rhinitis    Arthritis    Personal history of other venous thrombosis and embolism    Polyphagia    Viral hepatitis C    Acute respiratory failure with hypoxia    Cardiomegaly    Pneumonia due to COVID-19 virus    Hypertensive disorder    Chronic anticoagulation    Deep venous thrombosis    Pneumonia    Sepsis due to pneumonia    Abnormal liver diagnostic imaging    Pneumonia of right lung due to infectious organism, unspecified part of lung    Dysphagia    History of laparoscopic adjustable gastric banding       Past Medical History  Past Medical History:   Diagnosis Date    Asthma 2/3/2020    Deep vein thrombosis of left lower extremity 3/19/2018    Essential hypertension, benign 2011    Excessive or frequent menstruation 2020    Low back pain 2019    Major depressive disorder, single episode, unspecified 2012    Mitral valve insufficiency 2008    Obesity, unspecified 2020    Obstructive sleep apnea (adult) (pediatric) 3/19/2018    Paroxysmal atrial fibrillation 3/27/2017    Polyp of corpus uteri 2012       Surgical History  Past Surgical History:   Procedure Laterality Date    BARIATRIC SURGERY  10/21/2020     SECTION  2005    TONSILLECTOMY   12/01/2012    TUBAL ABDOMINAL LIGATION  10/21/2010       Family History  Family History   Problem Relation Age of Onset    Anxiety disorder Mother     Arthritis Mother     Asthma Mother     Hyperlipidemia Mother        Social History  Social History    Tobacco Use      Smoking status: Never      Smokeless tobacco: Never       Is the Patient a current tobacco user? No    Allergies  No Known Allergies    Current Medications    Current Outpatient Medications:     acetaminophen (TYLENOL) 500 MG tablet, Take 1 tablet by mouth As Needed for Mild Pain., Disp: , Rfl:     apixaban (ELIQUIS) 5 MG tablet tablet, Take 1 tablet by mouth 2 (Two) Times a Day., Disp: , Rfl:     ascorbic acid (VITAMIN C) 500 MG tablet, Take 1 tablet by mouth As Needed., Disp: , Rfl:     busPIRone (BUSPAR) 15 MG tablet, Take 1 tablet by mouth 2 (Two) Times a Day., Disp: , Rfl:     flecainide (TAMBOCOR) 50 MG tablet, Take 1 tablet by mouth Every 12 (Twelve) Hours., Disp: , Rfl:     hydroCHLOROthiazide (HYDRODIURIL) 25 MG tablet, Take 1 tablet by mouth Daily., Disp: , Rfl:     metoprolol succinate XL (TOPROL-XL) 50 MG 24 hr tablet, Take 0.5 tablets by mouth Daily., Disp: , Rfl:     multivitamin (THERAGRAN) tablet tablet, Take 1 tablet by mouth As Needed., Disp: , Rfl:     rosuvastatin (Crestor) 10 MG tablet, Take 1 tablet by mouth Daily., Disp: 90 tablet, Rfl: 1     Review of System  Review of Systems   Constitutional: Negative.    HENT: Negative.     Eyes: Negative.    Respiratory: Negative.     Cardiovascular: Negative.    Gastrointestinal: Negative.    Musculoskeletal:  Positive for joint swelling.        Bilateral 2+ edema of the lower extremities from the calf down   Skin:         Hyper pigmentation of the lower extremities from the calf down     I have reviewed and confirmed the accuracy of the ROS as documented by the MA/LPN/RN Yousuf Dhillon MD    There were no vitals filed for this visit.  Body mass index is 49.23 kg/m².    Objective      Physical Exam  Physical Exam  HENT:      Head: Normocephalic and atraumatic.      Right Ear: Tympanic membrane normal.      Left Ear: Tympanic membrane normal.      Nose: Nose normal.   Eyes:      Extraocular Movements: Extraocular movements intact.      Pupils: Pupils are equal, round, and reactive to light.   Neck:      Comments: Thyromegaly apparent  Cardiovascular:      Rate and Rhythm: Normal rate and regular rhythm.      Pulses: Normal pulses.      Heart sounds: Normal heart sounds.   Pulmonary:      Effort: Pulmonary effort is normal.      Breath sounds: Normal breath sounds.   Abdominal:      Comments: Increased abdominal girth   Musculoskeletal:         General: Swelling present.      Comments: Bilateral lower extremity edema   Skin:     General: Skin is warm and dry.      Comments: Hyper pigmentation appreciated from the calf down to the ankles.  2+ edema appreciated from the calf down   Neurological:      General: No focal deficit present.      Mental Status: She is oriented to person, place, and time.   Psychiatric:         Mood and Affect: Mood normal.         Behavior: Behavior normal.             Yousuf Dhillon MD  02/13/2024

## 2024-02-14 LAB
ALBUMIN SERPL-MCNC: 4.2 G/DL (ref 3.5–5.2)
ALBUMIN/GLOB SERPL: 1.6 G/DL
ALP SERPL-CCNC: 98 U/L (ref 39–117)
ALT SERPL-CCNC: 9 U/L (ref 1–33)
AST SERPL-CCNC: 12 U/L (ref 1–32)
BASOPHILS # BLD AUTO: 0.03 10*3/MM3 (ref 0–0.2)
BASOPHILS NFR BLD AUTO: 0.5 % (ref 0–1.5)
BILIRUB SERPL-MCNC: 0.5 MG/DL (ref 0–1.2)
BUN SERPL-MCNC: 12 MG/DL (ref 6–20)
BUN/CREAT SERPL: 13.5 (ref 7–25)
CALCIUM SERPL-MCNC: 9.6 MG/DL (ref 8.6–10.5)
CHLORIDE SERPL-SCNC: 104 MMOL/L (ref 98–107)
CHOLEST SERPL-MCNC: 168 MG/DL (ref 0–200)
CHOLEST/HDLC SERPL: 3.36 {RATIO}
CO2 SERPL-SCNC: 27.8 MMOL/L (ref 22–29)
CREAT SERPL-MCNC: 0.89 MG/DL (ref 0.57–1)
EGFRCR SERPLBLD CKD-EPI 2021: 78.6 ML/MIN/1.73
EOSINOPHIL # BLD AUTO: 0.2 10*3/MM3 (ref 0–0.4)
EOSINOPHIL NFR BLD AUTO: 3.3 % (ref 0.3–6.2)
ERYTHROCYTE [DISTWIDTH] IN BLOOD BY AUTOMATED COUNT: 13.2 % (ref 12.3–15.4)
GLOBULIN SER CALC-MCNC: 2.6 GM/DL
GLUCOSE SERPL-MCNC: 82 MG/DL (ref 65–99)
HCT VFR BLD AUTO: 39.2 % (ref 34–46.6)
HDLC SERPL-MCNC: 50 MG/DL (ref 40–60)
HGB BLD-MCNC: 12.9 G/DL (ref 12–15.9)
IMM GRANULOCYTES # BLD AUTO: 0.01 10*3/MM3 (ref 0–0.05)
IMM GRANULOCYTES NFR BLD AUTO: 0.2 % (ref 0–0.5)
LDLC SERPL CALC-MCNC: 108 MG/DL (ref 0–100)
LYMPHOCYTES # BLD AUTO: 2.55 10*3/MM3 (ref 0.7–3.1)
LYMPHOCYTES NFR BLD AUTO: 41.5 % (ref 19.6–45.3)
MCH RBC QN AUTO: 29.9 PG (ref 26.6–33)
MCHC RBC AUTO-ENTMCNC: 32.9 G/DL (ref 31.5–35.7)
MCV RBC AUTO: 91 FL (ref 79–97)
MONOCYTES # BLD AUTO: 0.49 10*3/MM3 (ref 0.1–0.9)
MONOCYTES NFR BLD AUTO: 8 % (ref 5–12)
NEUTROPHILS # BLD AUTO: 2.87 10*3/MM3 (ref 1.7–7)
NEUTROPHILS NFR BLD AUTO: 46.5 % (ref 42.7–76)
NRBC BLD AUTO-RTO: 0 /100 WBC (ref 0–0.2)
PLATELET # BLD AUTO: 233 10*3/MM3 (ref 140–450)
POTASSIUM SERPL-SCNC: 4.4 MMOL/L (ref 3.5–5.2)
PROT SERPL-MCNC: 6.8 G/DL (ref 6–8.5)
RBC # BLD AUTO: 4.31 10*6/MM3 (ref 3.77–5.28)
SODIUM SERPL-SCNC: 142 MMOL/L (ref 136–145)
TRIGL SERPL-MCNC: 50 MG/DL (ref 0–150)
TSH SERPL DL<=0.005 MIU/L-ACNC: 1.05 UIU/ML (ref 0.27–4.2)
VLDLC SERPL CALC-MCNC: 10 MG/DL (ref 5–40)
WBC # BLD AUTO: 6.15 10*3/MM3 (ref 3.4–10.8)

## 2024-02-15 RX ORDER — ROSUVASTATIN CALCIUM 10 MG/1
10 TABLET, COATED ORAL DAILY
Qty: 90 TABLET | Refills: 1 | Status: SHIPPED | OUTPATIENT
Start: 2024-02-15

## 2024-02-16 NOTE — PROGRESS NOTES
LDL/bad cholesterol remains elevated.  Lets start a low-cholesterol diet but I will also add Crestor 10 mg 1 tablet at bedtime

## 2024-02-27 ENCOUNTER — HOSPITAL ENCOUNTER (OUTPATIENT)
Facility: HOSPITAL | Age: 54
Discharge: HOME | End: 2024-02-27
Admitting: INTERNAL MEDICINE
Payer: COMMERCIAL

## 2024-02-27 PROCEDURE — 76536 US EXAM OF HEAD AND NECK: CPT

## 2024-03-18 ENCOUNTER — HOSPITAL ENCOUNTER (OUTPATIENT)
Dept: OCCUPATIONAL THERAPY | Facility: HOSPITAL | Age: 54
Setting detail: THERAPIES SERIES
Discharge: HOME | End: 2024-03-18
Payer: COMMERCIAL

## 2024-03-18 DIAGNOSIS — R60.0 LIPEDEMA OF LOWER EXTREMITY: ICD-10-CM

## 2024-03-18 DIAGNOSIS — I89.0 LYMPHEDEMA OF BOTH LOWER EXTREMITIES: Primary | ICD-10-CM

## 2024-03-18 PROCEDURE — 97166 OT EVAL MOD COMPLEX 45 MIN: CPT

## 2024-03-18 PROCEDURE — 97535 SELF CARE MNGMENT TRAINING: CPT

## 2024-03-18 NOTE — THERAPY EVALUATION
Outpatient Occupational Therapy Lymphedema Initial Evaluation  [facility]     Patient Name: Sarah Frey  : 1970  MRN: [id number]  Today's Date: 3/18/2024      Visit Date: 2024    Patient Active Problem List   Diagnosis    Asthma    Low back pain    Deep vein thrombosis of left lower extremity    Obstructive sleep apnea syndrome    Paroxysmal atrial fibrillation    Major depression, single episode    Excessive or frequent menstruation    Polyp of corpus uteri    Benign essential hypertension    Mitral valve regurgitation    Obesity    Encounter for gynecological examination    History of deep vein thrombosis (DVT) of lower extremity    Allergic rhinitis    Arthritis    Personal history of other venous thrombosis and embolism    Polyphagia    Viral hepatitis C    Acute respiratory failure with hypoxia    Cardiomegaly    Pneumonia due to COVID-19 virus    Hypertensive disorder    Chronic anticoagulation    Deep venous thrombosis    Pneumonia    Sepsis due to pneumonia    Abnormal liver diagnostic imaging    Pneumonia of right lung due to infectious organism, unspecified part of lung    Dysphagia    History of laparoscopic adjustable gastric banding        Past Medical History:   Diagnosis Date    Asthma 2/3/2020    Deep vein thrombosis of left lower extremity 3/19/2018    Essential hypertension, benign 2011    Excessive or frequent menstruation 2020    Low back pain 2019    Major depressive disorder, single episode, unspecified 2012    Mitral valve insufficiency 2008    Obesity, unspecified 2020    Obstructive sleep apnea (adult) (pediatric) 3/19/2018    Paroxysmal atrial fibrillation 3/27/2017    Polyp of corpus uteri 2012        Past Surgical History:   Procedure Laterality Date    BARIATRIC SURGERY  10/21/2020     SECTION  2005    TONSILLECTOMY  2012    TUBAL ABDOMINAL LIGATION  10/21/2010         Visit Dx:     ICD-10-CM ICD-9-CM    1. Lymphedema of both lower extremities  I89.0 457.1   2. Lipedema of lower extremity  R60.0 782.3        Patient History       Row Name 03/18/24 1400             History    Chief Complaint Swelling;Pain  -RE      Type of Pain Lower Extremity / Leg  -RE      Date Current Problem(s) Began --  age 15 or 16  -RE      Brief Description of Current Complaint Patient presents to Occupational Therapy for evaluation of bilateral lower extremity swelling.  Patient reports that her swelling started at about age 15 or 16.  She complains of tenderness to touch, swelling, bruising easily, and that the lower half of her body is disproportionate to the upper half.  She does have a history of DVT in her left lower extremity.  She is on Eliquis.  -RE      Patient/Caregiver Goals Know what to do to help the symptoms;Decrease swelling;Relieve pain  -RE      How has patient tried to help current problem? Off-the-shelf compression leggings  -RE      Are you or can you be pregnant No  -RE         Pain     Pain Location Leg  -RE      Pain at Present 5  -RE      Pain Frequency Constant/continuous  -RE      Pain Comments varies from thigh to lower leg  -RE         Fall Risk Assessment    Any falls in the past year: No  -RE         Services    Are you currently receiving Home Health services No  -RE         Daily Activities    Primary Language English  -RE      Are you able to read Yes  -RE      Are you able to write Yes  -RE      How does patient learn best? Listening;Reading;Demonstration  -RE      Teaching needs identified Management of Condition;Home Exercise Program  -RE      Patient is concerned about/has problems with Sitting;Walking;Other (comment)  exercise  -RE      Barriers to learning None  -RE      Explanation of Functional Status Problem no issues  -RE      Pt Participated in POC and Goals Yes  -RE         Safety    Are you being hurt, hit, or frightened by anyone at home or in your life? No  -RE      Are you being neglected  "by a caregiver No  -RE                User Key  (r) = Recorded By, (t) = Taken By, (c) = Cosigned By      Initials Name Provider Type    RE Kerline Hogan OTR Occupational Therapist                     Lymphedema       Row Name 03/18/24 1400             Subjective Pain    Able to rate subjective pain? yes  -RE      Pre-Treatment Pain Level 5  -RE         Lymphedema Assessment    Lymphedema Classification RLE:;LLE:  -RE         LLIS - Physical Concerns    The amount of pain associated with my lymphedema is: 3  -RE      The amount of limb heaviness associated with my lymphedema is: 4  -RE      The amount of skin tightness associated with my lymphedema is: 3  -RE      The size of my swollen limb(s) seems: 2  -RE      Lymphedema affects the movement of my swollen limb(s): 2  -RE      The strength in my swollen limb(s) is: 2  -RE         LLIS - Psychosocial Concerns    Lymphedema affects my body image (i.e., \"how I think I look\"). 4  -RE      Lymphedema affects my socializing with others. 4  -RE      Lymphedema affects my intimate relations with spouse or partner (rate 0 if not applicable 0  -RE      Lymphedema \"gets me down\" (i.e., depression, frustration, or anger) 1  -RE      I must rely on others for help due to my lymphedema. 0  -RE      I know what to do to manage my lymphedema 2  -RE         LLIS - Functional Concerns    Lymphedema affects my ability to perform self-care activities (i.e. eating, dressing, hygiene) 1  -RE      Lymphedema affects my ability to perform routine home or work-related activities. 2  -RE      Lymphedema affects my performance of preferred leisure activities. 2  -RE      Lymphedema affects proper fit of clothing/shoes 3  -RE      Lymphedema affects my sleep 2  -RE         Posture/Observations    Posture- WNL Posture is WNL  -RE         General ROM    GENERAL ROM COMMENTS Bilateral lower extremity active range of motion is within functional limits  -RE         MMT (Manual Muscle Testing)    " General MMT Comments Bilateral lower extremity strength is grossly within functional limits  -RE         Lymphedema Edema Assessment    Ptting Edema Category By grade out of 4  -RE      Pitting Edema + 2/4;Moderate  -RE      Stemmer Sign left:;negative  -RE      Hanson Hump bilateral:;negative  -RE         Skin Changes/Observations    Skin Observations Comment Skin is intact and normal in color.  -RE         Lymphedema Sensation    Lymphedema Sensation Reports RLE:;LLE:;normal  -RE         Lymphedema Pulses/Capillary Refill    Lymphedema Pulses/Capillary Refill capillary refill  -RE      Capillary Refill lower extremity capillary refill  -RE      Lower Extremity Capillary Refill right:;left:;less than 3 seconds  -RE         Lymphedema Measurements    Measurement Type(s) Circumferential  -RE      Circumferential Areas Lower extremities  -RE         BLE Circumferential (cm)    Measurement Location 1 30 cm above knee  -RE      Left 1 88 cm  -RE      Right 1 90 cm  -RE      Measurement Location 2 20 cm above knee  -RE      Left 2 85 cm  -RE      Right 2 83 cm  -RE      Measurement Location 3 10 cm above knee  -RE      Left 3 71 cm  -RE      Right 3 72 cm  -RE      Measurement Location 4 Knee  -RE      Left 4 59 cm  -RE      Right 4 59 cm  -RE      Measurement Location 5 10 cm below-knee  -RE      Left 5 57.5 cm  -RE      Right 5 60 cm  -RE      Measurement Location 6 20 cm below-knee  -RE      Left 6 46.5 cm  -RE      Right 6 48 cm  -RE      Measurement Location 7 30 cm below-knee  -RE      Left 7 33 cm  -RE      Right 7 35 cm  -RE      Measurement Location 8 Ankle  -RE      Left 8 27.5 cm  -RE      Right 8 27.5 cm  -RE      Measurement Location 9 Midfoot  -RE      Left 9 23.5 cm  -RE      Right 9 23.5 cm  -RE      LLE Circumferential Total 491 cm  -RE      RLE Circumferential Total 498 cm  -RE         Lymphedema Life Impact Scale Totals    A.  Total Q1 - Q17 (Do not include Q18) 37  -RE      B.  Total number of  "questions answered (Q1-Q17) 17  -RE      C. Divide A by B 2.18  -RE      D. Multiple C by 25 54.5  -RE                User Key  (r) = Recorded By, (t) = Taken By, (c) = Cosigned By      Initials Name Provider Type    Kerline Torres, OTR Occupational Therapist                            Therapy Education  Education Details: Discussed lymphedema treatment including estimated duration. Gave patient \"Healthy Habits for Lymphedema Patients\" and \"What is Lymphedema\" and reviewed with patient. Discussed disease process and what to expect from therapy.  Given: Edema management, Symptoms/condition management, Bandaging/dressing change  Program: New  How Provided: Verbal, Written  Provided to: Patient  Level of Understanding: Teach back education performed, Verbalized  73254 - OT Self Care/Mgmt Minutes: 15         OT Goals       Row Name 03/18/24 1600          OT Short Term Goals    STG Date to Achieve 04/18/24  -RE     STG 1 Patient will demonstrate proper awareness of “What is Lymphedema?” and \"Healthy Habits\" for improved prevention, management, care of symptoms and ease of transition to self-care of condition.  -RE     STG 1 Progress New  -RE     STG 2 Patient independent and compliant with self-wrapping techniques of compression bandages and/or velcro products with assistance of caregiver as needed for improved self-management of condition.  -RE     STG 2 Progress New  -RE     STG 3 Patient will demonstrate decreased net edema of bilateral lower extremities >/= 3-7cm  for decrease in edema, symptoms, decreased risk of infection and improved skin care/transition to self-care of condition.  -RE     STG 3 Progress New  -RE        Long Term Goals    LTG Date to Achieve 06/18/24  -RE     LTG 1 Patient will demonstrate decreased net edema of bilateral lower extremities >/= 8-12 cm  for decrease in edema, symptoms, decreased risk of infection and improved skin care/transition to self-care of condition.  -RE     LTG 1 Progress " New  -RE     LTG 2 Patient independent and compliant with initial home exercise program focused on diaphragmatic breathing, range of motion, flexibility to decrease edema and improve lymphatic flow for decreased edema and decreased risk of infection.  -RE     LTG 2 Progress New  -RE     LTG 3 Patient to improve LLIS by 10 points by discharge.  -RE     LTG 3 Progress New  -RE     LTG 4 Patient independent and compliant with use and care of compression garments and/or Velcro products with assistance of a caregiver as needed to promote self-care independence.  -RE     LTG 4 Progress New  -RE        Time Calculation    OT Goal Re-Cert Due Date 06/18/24  -RE               User Key  (r) = Recorded By, (t) = Taken By, (c) = Cosigned By      Initials Name Provider Type    Kerline Torres OTR Occupational Therapist                     OT Assessment/Plan       Row Name 03/18/24 1649          OT Assessment    Impairments Edema;Impaired lymphatic circulation;Pain  -RE     Assessment Comments Patient is a 53-year-old female that presents with signs and symptoms consistent with bilateral lower extremity lymphedema and lipedema which extends from feet to groin.  Pitting edema is primarily present below the knee and is 2+.  The total circumference of the right lower extremity is 498 cm and the left lower extremity is 491 cm.  Patient complains of consistent bilateral lower extremity pain which is worse in the lower legs than the thighs.  She also notes that she bruises easily and that her lower body is out of proportion with her upper body.  These are symptoms which are consistent with lipedema.  Patient has lower leg swelling which is consistent with lymphedema.  She could benefit from complete decongestive therapy to decrease edema, protect and improve skin integrity, decrease the risk of infection, and to learn self-care strategies.  -RE     OT Diagnosis Bilateral lower extremity lymphedema and lipedema  -RE     OT Rehab  Potential Good  -RE     Patient/caregiver participated in establishment of treatment plan and goals Yes  -RE     Patient would benefit from skilled therapy intervention Yes  -RE        OT Plan    OT Frequency 2x/week;4x/week  -RE     Predicted Duration of Therapy Intervention (OT) 2-4 times a week x 4 weeks  -RE     Planned CPT's? OT EVAL MOD COMPLEXITY: 12697;OT SELF CARE/MGMT/TRAIN 15 MIN: 08086;OT MANUAL THERAPY EA 15 MIN: 27518  -RE     Planned Therapy Interventions (Optional Details) home exercise program;manual therapy techniques;patient/family education;other (see comments)  Skin care and compression bandaging  -RE               User Key  (r) = Recorded By, (t) = Taken By, (c) = Cosigned By      Initials Name Provider Type    Kerline Torres OTR Occupational Therapist                    Outcome Measure Options: Lower Extremity Functional Scale (LEFS)         Time Calculation:   OT Start Time: 1440  OT Stop Time: 1535  OT Time Calculation (min): 55 min  Total Timed Code Minutes- OT: 15 minute(s)  Timed Charges  87537 - OT Self Care/Mgmt Minutes: 15  Untimed Charges  OT Eval/Re-eval Minutes: 40  Total Minutes  Timed Charges Total Minutes: 15  Untimed Charges Total Minutes: 40   Total Minutes: 55     Therapy Charges for Today       Code Description Service Date Service Provider Modifiers Qty    01023600834  OT SELF CARE/MGMT/TRAIN EA 15 MIN 3/18/2024 Kerline Hogan OTR GO 1    [id number]  OT EVAL MOD COMPLEXITY 3 3/18/2024 Kerline Hogan OTR GO 1          Dictated utilizing Dragon dictation:  Much of this encounter note is an electronic transcription/translation of spoken language to printed text. The electronic translation of spoken language may permit erroneous, or at times, nonsensical words or phrases to be inadvertently transcribed; Although I have reviewed the note for such errors, some may still exist.            ROLANDO Hackett  3/18/2024

## 2024-09-05 ENCOUNTER — TELEPHONE (OUTPATIENT)
Dept: FAMILY MEDICINE CLINIC | Facility: CLINIC | Age: 54
End: 2024-09-05
Payer: COMMERCIAL

## 2024-12-19 ENCOUNTER — PATIENT OUTREACH (OUTPATIENT)
Dept: FAMILY MEDICINE CLINIC | Facility: CLINIC | Age: 54
End: 2024-12-19
Payer: COMMERCIAL

## 2024-12-19 NOTE — OUTREACH NOTE
Spoke to pt about mammo. Pt said she had it completed at [facility]. Asked pt to have them fax us the results to add to her chart.

## 2025-06-20 ENCOUNTER — OFFICE VISIT (OUTPATIENT)
Dept: FAMILY MEDICINE CLINIC | Facility: CLINIC | Age: 55
End: 2025-06-20
Payer: COMMERCIAL

## 2025-06-20 VITALS — BODY MASS INDEX: 47.09 KG/M2 | WEIGHT: 293 LBS | HEIGHT: 66 IN

## 2025-06-20 DIAGNOSIS — M25.562 ACUTE PAIN OF LEFT KNEE: ICD-10-CM

## 2025-06-20 DIAGNOSIS — Z00.00 ENCOUNTER FOR PHYSICAL EXAMINATION: ICD-10-CM

## 2025-06-20 DIAGNOSIS — E78.5 HYPERLIPIDEMIA, UNSPECIFIED HYPERLIPIDEMIA TYPE: ICD-10-CM

## 2025-06-20 DIAGNOSIS — I10 HYPERTENSION, UNSPECIFIED TYPE: Primary | ICD-10-CM

## 2025-06-20 DIAGNOSIS — Z13.29 SCREENING FOR HYPOTHYROIDISM: ICD-10-CM

## 2025-06-20 DIAGNOSIS — Z13.0 SCREENING FOR DEFICIENCY ANEMIA: ICD-10-CM

## 2025-06-20 RX ORDER — LISINOPRIL AND HYDROCHLOROTHIAZIDE 10; 12.5 MG/1; MG/1
1 TABLET ORAL DAILY
COMMUNITY

## 2025-06-20 RX ORDER — ATOMOXETINE 40 MG/1
40 CAPSULE ORAL DAILY
COMMUNITY

## 2025-06-24 LAB
ALBUMIN SERPL-MCNC: 3.7 G/DL (ref 3.5–5.2)
ALBUMIN/GLOB SERPL: 1.4 G/DL
ALP SERPL-CCNC: 86 U/L (ref 39–117)
ALT SERPL-CCNC: 35 U/L (ref 1–33)
AST SERPL-CCNC: 38 U/L (ref 1–32)
BASOPHILS # BLD AUTO: 0.04 10*3/MM3 (ref 0–0.2)
BASOPHILS NFR BLD AUTO: 0.5 % (ref 0–1.5)
BILIRUB SERPL-MCNC: 0.5 MG/DL (ref 0–1.2)
BUN SERPL-MCNC: 13 MG/DL (ref 6–20)
BUN/CREAT SERPL: 15.5 (ref 7–25)
CALCIUM SERPL-MCNC: 9.3 MG/DL (ref 8.6–10.5)
CHLORIDE SERPL-SCNC: 106 MMOL/L (ref 98–107)
CHOLEST SERPL-MCNC: 132 MG/DL (ref 0–200)
CHOLEST/HDLC SERPL: 2.93 {RATIO}
CO2 SERPL-SCNC: 26.3 MMOL/L (ref 22–29)
CREAT SERPL-MCNC: 0.84 MG/DL (ref 0.57–1)
EGFRCR SERPLBLD CKD-EPI 2021: 83.7 ML/MIN/1.73
EOSINOPHIL # BLD AUTO: 0.33 10*3/MM3 (ref 0–0.4)
EOSINOPHIL NFR BLD AUTO: 4.2 % (ref 0.3–6.2)
ERYTHROCYTE [DISTWIDTH] IN BLOOD BY AUTOMATED COUNT: 13.2 % (ref 12.3–15.4)
GLOBULIN SER CALC-MCNC: 2.6 GM/DL
GLUCOSE SERPL-MCNC: 90 MG/DL (ref 65–99)
HCT VFR BLD AUTO: 36.8 % (ref 34–46.6)
HDLC SERPL-MCNC: 45 MG/DL (ref 40–60)
HGB BLD-MCNC: 11.7 G/DL (ref 12–15.9)
IMM GRANULOCYTES # BLD AUTO: 0.02 10*3/MM3 (ref 0–0.05)
IMM GRANULOCYTES NFR BLD AUTO: 0.3 % (ref 0–0.5)
LDLC SERPL CALC-MCNC: 79 MG/DL (ref 0–100)
LYMPHOCYTES # BLD AUTO: 1.74 10*3/MM3 (ref 0.7–3.1)
LYMPHOCYTES NFR BLD AUTO: 22.1 % (ref 19.6–45.3)
MCH RBC QN AUTO: 29.9 PG (ref 26.6–33)
MCHC RBC AUTO-ENTMCNC: 31.8 G/DL (ref 31.5–35.7)
MCV RBC AUTO: 94.1 FL (ref 79–97)
MONOCYTES # BLD AUTO: 0.81 10*3/MM3 (ref 0.1–0.9)
MONOCYTES NFR BLD AUTO: 10.3 % (ref 5–12)
NEUTROPHILS # BLD AUTO: 4.92 10*3/MM3 (ref 1.7–7)
NEUTROPHILS NFR BLD AUTO: 62.6 % (ref 42.7–76)
NRBC BLD AUTO-RTO: 0 /100 WBC (ref 0–0.2)
PLATELET # BLD AUTO: 223 10*3/MM3 (ref 140–450)
POTASSIUM SERPL-SCNC: 4.5 MMOL/L (ref 3.5–5.2)
PROT SERPL-MCNC: 6.3 G/DL (ref 6–8.5)
RBC # BLD AUTO: 3.91 10*6/MM3 (ref 3.77–5.28)
SODIUM SERPL-SCNC: 142 MMOL/L (ref 136–145)
TRIGL SERPL-MCNC: 28 MG/DL (ref 0–150)
TSH SERPL DL<=0.005 MIU/L-ACNC: 1.16 UIU/ML (ref 0.27–4.2)
VLDLC SERPL CALC-MCNC: 8 MG/DL (ref 5–40)
WBC # BLD AUTO: 7.86 10*3/MM3 (ref 3.4–10.8)

## 2025-07-02 NOTE — PROGRESS NOTES
06/20/2025    My Summary of Visit     This pleasant 54-year-old  presents at this time for physical examination.  She has been lost to follow-up for another number of months.  Her BMI is 49.6.    Her blood pressure on recheck by me in the left arm sitting position was 140/80.    We related that our goal is to keep her LDL less than 100 and she will come in for a lipid profile in the next several days.  We also reiterated our goal of getting at least 30 minutes of exercise 3 days a week.  Will also give her a low-cholesterol diet sheet for implementation at our direction dependent on her LDL.    She relates that she was given a cortisone injection on 4/25 after having significant knee pain she relates that she was told that this is a Baker's cyst.    Assessment & Plan  1. Baker's cyst.  - Significant knee pain, previously managed with cortisone injection in 04/2025, effects have worn off.  - Diagnosed with Baker's cyst and osteoarthritis, contributing to knee pain.  - Referral to orthopedic surgeon for further evaluation of Baker's cyst and potential medial collateral ligament tear; MRI may be necessary.  - Follow-up in 3 to 4 weeks.    2. Hypertension.  - Blood pressure recorded at 140/80.  - Blood pressure will be rechecked before the patient leaves.    3. Hyperlipidemia.  - Screening for hyperlipidemia will be conducted.    4. Anemia.  - Screening for anemia will be conducted.    5. Hypothyroidism.  - Screening for hypothyroidism will be conducted.    Results      Class 3 Severe Obesity (BMI >=40). Obesity-related health conditions include the following: hypertension. Obesity is improving with lifestyle modifications. BMI is is above average; BMI management plan is completed. We discussed portion control and increasing exercise.           CC: Annual Exam (Unable to hear bp)  .        HPI  History of Present Illness   History of Present Illness  The patient presents for evaluation of a Baker's  cyst.    She has been experiencing instability in her knees, initially attributing it to a potential blood clot. However, an emergency room visit revealed the presence of a Baker's cyst in the posterior aspect of her knee. She was referred to a specialist who administered a cortisone injection in 04/2025, which provided relief until a few weeks ago. Currently, she is experiencing significant pain in her knee. She also reports a popping sensation in her knee when stepping up on her porch, accompanied by a lump. She is uncertain if this indicates a tear. While she has not experienced any falls, she reports instability in her knee. Her right knee has always been problematic, but recently, her left knee has been causing more issues. She has a known diagnosis of osteoarthritis and reports a bone-on-bone condition in both knees. She manages this chronic pain as part of her daily life. She was informed that her symptoms are exacerbated by her weight and that drainage would only provide temporary relief due to the underlying osteoarthritis.    She reports being up to date on her female exam and colonoscopy. She had a uterine cyst removed in 12/2024. A heart murmur was noted 4 years ago.    PAST SURGICAL HISTORY:  Uterine cyst removal in 12/2024.    SOCIAL HISTORY  She has 2 sons, aged 20 and 14.    FAMILY HISTORY  She does not have a family history of skin cancer or gallbladder removal.    Renny Frey is a 54 y.o. female.      The following portions of the patient's history were reviewed and updated as appropriate: allergies, current medications, past family history, past medical history, past social history, past surgical history, and problem list.    Problem List  Patient Active Problem List   Diagnosis    Asthma    Low back pain    Deep vein thrombosis of left lower extremity    Obstructive sleep apnea syndrome    Paroxysmal atrial fibrillation    Major depression, single episode    Excessive or frequent  menstruation    Polyp of corpus uteri    Benign essential hypertension    Mitral valve regurgitation    Obesity    Encounter for gynecological examination    History of deep vein thrombosis (DVT) of lower extremity    Allergic rhinitis    Arthritis    Personal history of other venous thrombosis and embolism    Polyphagia    Viral hepatitis C    Acute respiratory failure with hypoxia    Cardiomegaly    Pneumonia due to COVID-19 virus    Hypertensive disorder    Chronic anticoagulation    Deep venous thrombosis    Pneumonia    Sepsis due to pneumonia    Abnormal liver diagnostic imaging    Pneumonia of right lung due to infectious organism, unspecified part of lung    Dysphagia    History of laparoscopic adjustable gastric banding       Past Medical History  Past Medical History:   Diagnosis Date    Arthritis     Asthma 2020    Deep vein thrombosis of left lower extremity 2018    Essential hypertension, benign 2011    Excessive or frequent menstruation 2020    Low back pain 2019    Major depressive disorder, single episode, unspecified 2012    Mitral valve insufficiency 2008    Obesity, unspecified 2020    Obstructive sleep apnea (adult) (pediatric) 2018    Paroxysmal atrial fibrillation 2017    Polyp of corpus uteri 2012       Surgical History  Past Surgical History:   Procedure Laterality Date    BARIATRIC SURGERY  10/21/2020     SECTION  2005    COLONOSCOPY      TONSILLECTOMY  2012    TUBAL ABDOMINAL LIGATION  10/21/2010       Family History  Family History   Problem Relation Age of Onset    Anxiety disorder Mother     Arthritis Mother     Asthma Mother     Hyperlipidemia Mother        Social History  Social History    Tobacco Use      Smoking status: Never      Smokeless tobacco: Never       Is the Patient a current tobacco user? No    Allergies  No Known Allergies    Current Medications    Current Outpatient  Medications:     acetaminophen (TYLENOL) 500 MG tablet, Take 1 tablet by mouth As Needed for Mild Pain., Disp: , Rfl:     apixaban (ELIQUIS) 5 MG tablet tablet, Take 1 tablet by mouth 2 (Two) Times a Day., Disp: , Rfl:     atomoxetine (STRATTERA) 40 MG capsule, Take 1 capsule by mouth Daily., Disp: , Rfl:     busPIRone (BUSPAR) 15 MG tablet, Take 1 tablet by mouth 2 (Two) Times a Day., Disp: , Rfl:     Cholecalciferol 250 MCG (08600 UT) capsule, Take 10,000 Units by mouth Daily., Disp: , Rfl:     flecainide (TAMBOCOR) 50 MG tablet, Take 1 tablet by mouth Every 12 (Twelve) Hours., Disp: , Rfl:     lisinopril-hydrochlorothiazide (PRINZIDE,ZESTORETIC) 10-12.5 MG per tablet, Take 1 tablet by mouth Daily., Disp: , Rfl:     metoprolol succinate XL (TOPROL-XL) 50 MG 24 hr tablet, Take 0.5 tablets by mouth Daily., Disp: , Rfl:     multivitamin (THERAGRAN) tablet tablet, Take 1 tablet by mouth As Needed., Disp: , Rfl:     ascorbic acid (VITAMIN C) 500 MG tablet, Take 1 tablet by mouth As Needed. (Patient not taking: Reported on 6/20/2025), Disp: , Rfl:      Review of System  Review of Systems   Constitutional: Negative.    HENT: Negative.     Eyes: Negative.    Respiratory: Negative.     Cardiovascular: Negative.    Gastrointestinal: Negative.    Endocrine: Negative.    Genitourinary: Negative.    Musculoskeletal: Negative.    Skin: Negative.    Allergic/Immunologic: Negative.    Neurological: Negative.    Hematological: Negative.    Psychiatric/Behavioral: Negative.     I have reviewed and confirmed the accuracy of the ROS as documented by the MA/LPN/RN Yousuf Dhillon MD    There were no vitals filed for this visit.  Body mass index is 49.55 kg/m².    Objective     Physical Exam  Physical Exam  Vitals and nursing note reviewed.   Constitutional:       Appearance: She is well-developed.   HENT:      Head: Normocephalic and atraumatic.   Eyes:      Conjunctiva/sclera: Conjunctivae normal.      Pupils: Pupils are equal,  round, and reactive to light.   Cardiovascular:      Rate and Rhythm: Normal rate and regular rhythm.      Heart sounds: Normal heart sounds.   Pulmonary:      Effort: Pulmonary effort is normal.      Breath sounds: Normal breath sounds.   Abdominal:      General: Bowel sounds are normal.      Palpations: Abdomen is soft.   Musculoskeletal:         General: Normal range of motion.      Cervical back: Normal range of motion and neck supple.   Skin:     General: Skin is warm.   Neurological:      Mental Status: She is alert.   Psychiatric:         Behavior: Behavior normal.         Thought Content: Thought content normal.         Judgment: Judgment normal.       Patient or patient representative verbalized consent for the use of Ambient Listening during the visit with  Yousuf Dhillon MD for chart documentation. 7/1/2025  21:30 EDT    Yousuf Dhillon MD  06/20/2025

## 2025-07-06 ENCOUNTER — RESULTS FOLLOW-UP (OUTPATIENT)
Dept: FAMILY MEDICINE CLINIC | Facility: CLINIC | Age: 55
End: 2025-07-06
Payer: COMMERCIAL

## 2025-07-06 NOTE — LETTER
Sarah Frey  [address] [facility] [address]    July 7, 2025     Dear Ms. Frey:    Below are the results from your recent visit:    Resulted Orders   Comprehensive Metabolic Panel   Result Value Ref Range    Glucose 90 65 - 99 mg/dL    BUN 13.0 6.0 - 20.0 mg/dL    Creatinine 0.84 0.57 - 1.00 mg/dL    EGFR Result 83.7 >60.0 mL/min/1.73      Comment:      GFR Categories in Chronic Kidney Disease (CKD)  GFR Category          GFR (mL/min/1.73)    Interpretation  G1                    90 or greater        Normal or high  (1)  G2                    60-89                Mild decrease  (1)  G3a                   45-59                Mild to moderate  decrease  G3b                   30-44                Moderate to  severe decrease  G4                    15-29                Severe decrease  G5                    14 or less           Kidney failure  (1)In the absence of evidence of kidney disease, neither  GFR category G1 or G2 fulfill the criteria for CKD.  eGFR calculation 2021 CKD-EPI creatinine equation, which  does not include race as a factor      BUN/Creatinine Ratio 15.5 7.0 - 25.0    Sodium 142 136 - 145 mmol/L    Potassium 4.5 3.5 - 5.2 mmol/L    Chloride 106 98 - 107 mmol/L    Total CO2 26.3 22.0 - 29.0 mmol/L    Calcium 9.3 8.6 - 10.5 mg/dL    Total Protein 6.3 6.0 - 8.5 g/dL    Albumin 3.7 3.5 - 5.2 g/dL    Globulin 2.6 gm/dL    A/G Ratio 1.4 g/dL    Total Bilirubin 0.5 0.0 - 1.2 mg/dL    Alkaline Phosphatase 86 39 - 117 U/L    AST (SGOT) 38 (H) 1 - 32 U/L    ALT (SGPT) 35 (H) 1 - 33 U/L   Lipid Panel With / Chol / HDL Ratio   Result Value Ref Range    Total Cholesterol 132 0 - 200 mg/dL      Comment:      Cholesterol Reference Ranges  (U.S. Department of Health and Human Services ATP III  Classifications)  Desirable          <200 mg/dL  Borderline High    200-239 mg/dL  High Risk          >240 mg/dL  Triglyceride Reference Ranges  (U.S. Department of Health and Human Services ATP  III  Classifications)  Normal           <150 mg/dL  Borderline High  150-199 mg/dL  High             200-499 mg/dL  Very High        >500 mg/dL  HDL Reference Ranges  (U.S. Department of Health and Human Services ATP III  Classifications)  Low     <40 mg/dl (major risk factor for CHD)  High    >60 mg/dl ('negative' risk factor for CHD)  LDL Reference Ranges  (U.S. Department of Health and Human Services ATP III  Classifications)  Optimal          <100 mg/dL  Near Optimal     100-129 mg/dL  Borderline High  130-159 mg/dL  High             160-189 mg/dL  Very High        >189 mg/dL  LDL is calculated using the NIH LDL-C calculation.      Triglycerides 28 0 - 150 mg/dL    HDL Cholesterol 45 40 - 60 mg/dL    VLDL Cholesterol Fabiano 8 5 - 40 mg/dL    LDL Chol Calc ([facility]) 79 0 - 100 mg/dL    Chol/HDL Ratio 2.93    CBC & Differential   Result Value Ref Range    WBC 7.86 3.40 - 10.80 10*3/mm3    RBC 3.91 3.77 - 5.28 10*6/mm3    Hemoglobin 11.7 (L) 12.0 - 15.9 g/dL    Hematocrit 36.8 34.0 - 46.6 %    MCV 94.1 79.0 - 97.0 fL    MCH 29.9 26.6 - 33.0 pg    MCHC 31.8 31.5 - 35.7 g/dL    RDW 13.2 12.3 - 15.4 %    Platelets 223 140 - 450 10*3/mm3    Neutrophil Rel % 62.6 42.7 - 76.0 %    Lymphocyte Rel % 22.1 19.6 - 45.3 %    Monocyte Rel % 10.3 5.0 - 12.0 %    Eosinophil Rel % 4.2 0.3 - 6.2 %    Basophil Rel % 0.5 0.0 - 1.5 %    Neutrophils Absolute 4.92 1.70 - 7.00 10*3/mm3    Lymphocytes Absolute 1.74 0.70 - 3.10 10*3/mm3    Monocytes Absolute 0.81 0.10 - 0.90 10*3/mm3    Eosinophils Absolute 0.33 0.00 - 0.40 10*3/mm3    Basophils Absolute 0.04 0.00 - 0.20 10*3/mm3    Immature Granulocyte Rel % 0.3 0.0 - 0.5 %    Immature Grans Absolute 0.02 0.00 - 0.05 10*3/mm3    nRBC 0.0 0.0 - 0.2 /100 WBC   TSH Rfx On Abnormal To Free T4   Result Value Ref Range    TSH 1.160 0.270 - 4.200 uIU/mL       Call patient to inform that results were normal          If you have any questions or concerns, please don't hesitate to call.          Sincerely,        Yousuf Dhillon MD

## 2025-07-16 NOTE — PROGRESS NOTES
New Knee      Patient: Sarah Frey        YOB: 1970    Medical Record Number: [id number]        Chief Complaints: Bilateral knee pain      History of Present Illness: This is a very nice 54-year-old  who presents complaining of left knee pain she states it began in April she just awoke 1 morning and had pain in the knee she has pain in both knees but the left is worse she did see another orthopedist who told her she had arthritis she was too heavy for a knee replacement told her all he can do his injections.  I think she just needed a little more discussion and some other options       Allergies: No Known Allergies    Medications:   Home Medications:  Current Outpatient Medications on File Prior to Visit   Medication Sig    acetaminophen (TYLENOL) 500 MG tablet Take 1 tablet by mouth As Needed for Mild Pain.    apixaban (ELIQUIS) 5 MG tablet tablet Take 1 tablet by mouth 2 (Two) Times a Day.    atomoxetine (STRATTERA) 40 MG capsule Take 1 capsule by mouth Daily.    busPIRone (BUSPAR) 15 MG tablet Take 1 tablet by mouth 2 (Two) Times a Day.    Cholecalciferol 250 MCG (47040 UT) capsule Take 10,000 Units by mouth Daily.    flecainide (TAMBOCOR) 50 MG tablet Take 1 tablet by mouth Every 12 (Twelve) Hours.    lisinopril-hydrochlorothiazide (PRINZIDE,ZESTORETIC) 10-12.5 MG per tablet Take 1 tablet by mouth Daily.    metoprolol succinate XL (TOPROL-XL) 50 MG 24 hr tablet Take 0.5 tablets by mouth Daily.    multivitamin (THERAGRAN) tablet tablet Take 1 tablet by mouth As Needed.    ascorbic acid (VITAMIN C) 500 MG tablet Take 1 tablet by mouth As Needed. (Patient not taking: Reported on 6/20/2025)     No current facility-administered medications on file prior to visit.     Current Medications:  Scheduled Meds:  Continuous Infusions:No current facility-administered medications for this visit.    PRN Meds:.    Past Medical History:   Diagnosis Date    ADHD (attention deficit hyperactivity  "disorder)     Anxiety     Arthritis     Arthritis of neck     Asthma 2020    Deep vein thrombosis of left lower extremity 2018    Essential hypertension, benign 2011    Excessive or frequent menstruation 2020    Fracture, fibula 1984    Knee swelling     Low back pain 2019    Major depressive disorder, single episode, unspecified 2012    Mitral valve insufficiency 2008    Obesity, unspecified 2020    Obstructive sleep apnea (adult) (pediatric) 2018    Paroxysmal atrial fibrillation 2017    Polyp of corpus uteri 2012        Past Surgical History:   Procedure Laterality Date    BARIATRIC SURGERY  10/21/2020     SECTION  2005    COLONOSCOPY      TONSILLECTOMY  2012    TRIGGER POINT INJECTION  2025    TUBAL ABDOMINAL LIGATION  10/21/2010        Social History     Occupational History    Not on file   Tobacco Use    Smoking status: Never    Smokeless tobacco: Never   Vaping Use    Vaping status: Never Used   Substance and Sexual Activity    Alcohol use: Not Currently     Comment: occas    Drug use: Never    Sexual activity: Not Currently     Partners: Male     Birth control/protection: Condom, Abstinence, Tubal ligation      Social History     Social History Narrative    Not on file        Family History   Problem Relation Age of Onset    Anxiety disorder Mother     Arthritis Mother     Asthma Mother     Hyperlipidemia Mother     Diabetes Mother              Review of Systems:     Review of Systems      Physical Exam: 54 y.o. female  General Appearance:    Alert, cooperative, in no acute distress                   Vitals:    25 1602   Temp: 97 °F (36.1 °C)   TempSrc: Temporal   Weight: (!) 137 kg (302 lb 9.6 oz)   Height: 167.6 cm (66\")   PainSc: 4    PainLoc: Knee      Patient is alert and read ×3 no acute distress appears her above-listed at height weight and age.  Affect is normal respiratory rate is normal " unlabored. Heart rate regular rate rhythm, sclera, dentition and hearing are normal for the purpose of this exam.        Ortho Exam  Physical exam of the right knee reveals no effusion, no erythema.  It mild loss of extension and full flexion  Patient has mild varus alignment.  They have mild tenderness to palpation about the medial compartment, no tenderness laterally..  The patient has a negative bounce home, negative Arun and a stable ligamentous exam.  Quad tone is reasonable and symmetric.  There are no overlying skin changes no lymphedema no lymphadenopathy.  There is good hip range of motion which is full symmetric and asymptomatic and a normal ankle exam.     Physical exam of the left knee reveals no effusion, no erythema.  The patient has no palpable tenderness along the medial joint line, no tenderness about the lateral joint line.  Patient does have crepitus with patellofemoral range of motion.  They also have subjective symptoms anteriorly during exam.  The patient has a negative bounce home, negative Arun and a stable ligamentous exam.  Quad tone is reasonable and symmetric.  There are no overlying skin changes no lymphedema no lymphadenopathy.  There is good hip range of motion which is full symmetric and asymptomatic and a normal ankle exam.  Hamstrings and IT band are tight bilaterally.   Large Joint Arthrocentesis: L knee  Date/Time: 7/21/2025 4:36 PM  Consent given by: patient  Site marked: site marked  Timeout: Immediately prior to procedure a time out was called to verify the correct patient, procedure, equipment, support staff and site/side marked as required   Supporting Documentation  Indications: pain   Procedure Details  Location: knee - L knee  Preparation: Patient was prepped and draped in the usual sterile fashion  Needle gauge: 21G.  Approach: anteromedial  Medications administered: 1 mL methylPREDNISolone acetate 80 MG/ML; 2 mL lidocaine PF 1% 1 %  Patient tolerance: patient  tolerated the procedure well with no immediate complications      Large Joint Arthrocentesis: R knee  Date/Time: 7/21/2025 4:36 PM  Consent given by: patient  Site marked: site marked  Timeout: Immediately prior to procedure a time out was called to verify the correct patient, procedure, equipment, support staff and site/side marked as required   Supporting Documentation  Indications: pain   Procedure Details  Location: knee - R knee  Preparation: Patient was prepped and draped in the usual sterile fashion  Needle gauge: 21G.  Approach: anteromedial  Medications administered: 80 mg methylPREDNISolone acetate 80 MG/ML; 2 mL lidocaine PF 1% 1 %  Patient tolerance: patient tolerated the procedure well with no immediate complications                 Radiology:   AP, Lateral and merchant views of the left knee  were ordered/reviewed to evauateknee pain.  I have no comparative films she has severe medial compartment narrowing on the right with a large loose body sitting inferior to the medial compartment and the medial gutter.  She has some narrowing medially on the left about 50% joint space remaining some lipping of the lateral femoral condyle severe patellofemoral arthritis  Imaging Results (Most Recent)       Procedure Component Value Units Date/Time    XR Knee 3 View Left [[id number]] Resulted: 07/21/25 1602     Updated: 07/21/25 1602    Impression:      Ordering physician's impression is located in the Encounter Note dated 07/21/25. X-ray performed in the DR room.          Assessment/Plan:    Bilateral knee pain I do agree this is degenerative in origin she could have meniscus tear on the left but she would not be a candidate for arthroscopy I told her we would reinject these as she did get some relief with the other physician did it we talked about the importance of quad and core strengthening but she is working hard on  And she has previously lost 70 pounds and continues her weight loss journey.  She understands  she can continue these every 3 months if it helps  I do think there is a slightly higher risk with injecting to joints but I think the benefits outweigh the risk we did talk about specifics as far as quad and core strengthening we talked about specific strategies for continued weight loss

## 2025-07-21 ENCOUNTER — OFFICE VISIT (OUTPATIENT)
Dept: ORTHOPEDIC SURGERY | Facility: CLINIC | Age: 55
End: 2025-07-21
Payer: COMMERCIAL

## 2025-07-21 VITALS — TEMPERATURE: 97 F | HEIGHT: 66 IN | WEIGHT: 293 LBS | BODY MASS INDEX: 47.09 KG/M2

## 2025-07-21 DIAGNOSIS — R52 PAIN: Primary | ICD-10-CM

## 2025-07-21 DIAGNOSIS — M17.0 PRIMARY OSTEOARTHRITIS OF BOTH KNEES: ICD-10-CM

## 2025-07-21 PROCEDURE — 99204 OFFICE O/P NEW MOD 45 MIN: CPT | Performed by: ORTHOPAEDIC SURGERY

## 2025-07-21 PROCEDURE — 20610 DRAIN/INJ JOINT/BURSA W/O US: CPT | Performed by: ORTHOPAEDIC SURGERY

## 2025-07-21 PROCEDURE — 73562 X-RAY EXAM OF KNEE 3: CPT | Performed by: ORTHOPAEDIC SURGERY

## 2025-07-21 RX ADMIN — LIDOCAINE HYDROCHLORIDE 2 ML: 10 INJECTION, SOLUTION EPIDURAL; INFILTRATION; INTRACAUDAL; PERINEURAL at 16:36

## 2025-07-21 RX ADMIN — METHYLPREDNISOLONE ACETATE 1 ML: 80 INJECTION, SUSPENSION INTRA-ARTICULAR; INTRALESIONAL; INTRAMUSCULAR; SOFT TISSUE at 16:36

## 2025-07-21 RX ADMIN — METHYLPREDNISOLONE ACETATE 80 MG: 80 INJECTION, SUSPENSION INTRA-ARTICULAR; INTRALESIONAL; INTRAMUSCULAR; SOFT TISSUE at 16:36

## 2025-07-22 RX ORDER — METHYLPREDNISOLONE ACETATE 80 MG/ML
1 INJECTION, SUSPENSION INTRA-ARTICULAR; INTRALESIONAL; INTRAMUSCULAR; SOFT TISSUE
Status: COMPLETED | OUTPATIENT
Start: 2025-07-21 | End: 2025-07-21

## 2025-07-22 RX ORDER — LIDOCAINE HYDROCHLORIDE 10 MG/ML
2 INJECTION, SOLUTION EPIDURAL; INFILTRATION; INTRACAUDAL; PERINEURAL
Status: COMPLETED | OUTPATIENT
Start: 2025-07-21 | End: 2025-07-21

## 2025-07-22 RX ORDER — METHYLPREDNISOLONE ACETATE 80 MG/ML
80 INJECTION, SUSPENSION INTRA-ARTICULAR; INTRALESIONAL; INTRAMUSCULAR; SOFT TISSUE
Status: COMPLETED | OUTPATIENT
Start: 2025-07-21 | End: 2025-07-21